# Patient Record
Sex: FEMALE | Race: WHITE | NOT HISPANIC OR LATINO | Employment: PART TIME | URBAN - METROPOLITAN AREA
[De-identification: names, ages, dates, MRNs, and addresses within clinical notes are randomized per-mention and may not be internally consistent; named-entity substitution may affect disease eponyms.]

---

## 2017-02-02 ENCOUNTER — OFFICE VISIT (OUTPATIENT)
Dept: MEDICAL GROUP | Facility: MEDICAL CENTER | Age: 42
End: 2017-02-02
Payer: OTHER GOVERNMENT

## 2017-02-02 VITALS
WEIGHT: 184.4 LBS | HEART RATE: 87 BPM | BODY MASS INDEX: 29.63 KG/M2 | HEIGHT: 66 IN | SYSTOLIC BLOOD PRESSURE: 98 MMHG | DIASTOLIC BLOOD PRESSURE: 68 MMHG | TEMPERATURE: 99.1 F | RESPIRATION RATE: 16 BRPM | OXYGEN SATURATION: 98 %

## 2017-02-02 DIAGNOSIS — Z13.6 SCREENING FOR CARDIOVASCULAR CONDITION: ICD-10-CM

## 2017-02-02 DIAGNOSIS — L40.9 PSORIASIS: ICD-10-CM

## 2017-02-02 DIAGNOSIS — Z00.00 WELLNESS EXAMINATION: ICD-10-CM

## 2017-02-02 DIAGNOSIS — R63.5 WEIGHT GAIN: ICD-10-CM

## 2017-02-02 DIAGNOSIS — Z97.5 IUD (INTRAUTERINE DEVICE) IN PLACE: ICD-10-CM

## 2017-02-02 PROCEDURE — 99203 OFFICE O/P NEW LOW 30 MIN: CPT | Performed by: PHYSICIAN ASSISTANT

## 2017-02-02 ASSESSMENT — PATIENT HEALTH QUESTIONNAIRE - PHQ9: CLINICAL INTERPRETATION OF PHQ2 SCORE: 0

## 2017-02-02 NOTE — Clinical Note
[facility]  Tete Moncada PA-C  [address]  [place] [address]  Fax: [phone] Authorization for Release/Disclosure of Protected Health Information   Name: SUZETTE DUGGAN : 1975 SSN: [id number]   Address: [facility] [place] [address] Phone:    [phone] (home)    I authorize the entity listed below to release/disclose the PHI below to [facility]/Tete Moncada PA-C   Provider or Entity Name:       Address   City, State, Zip   Phone:      Fax:     Reason for request: continuity of care   Information to be released:    [  ] LAST COLONOSCOPY, including any PATH REPORT [  ] LAST DEXA  [  ] LAST MAMMOGRAM  [  ] LAST PAP [  ] RETINA EXAM REPORT  [  ] IMMUNIZATION RECORDS  [  ] Release all info      [  ] Check here and initial the line next to each item to release ALL health information INCLUDING  _____ Care and treatment for drug and / or alcohol abuse  _____ HIV testing, infection status, or AIDS  _____ Genetic Testing    DATES OF SERVICE OR TIME PERIOD TO BE DISCLOSED: _____________  I understand and acknowledge that:  * This Authorization may be revoked at any time by you in writing, except if your health information has already been used or disclosed.  * Your health information that will be used or disclosed as a result of you signing this authorization could be re-disclosed by the recipient. If this occurs, your re-disclosed health information may no longer be protected by State or Federal laws.  * You may refuse to sign this Authorization. Your refusal will not affect your ability to obtain treatment.  * This Authorization becomes effective upon signing and will  on (date) __________. If no date is indicated, this Authorization will  one (1) year from the signature date.    Name: Suzette Duggan    Signature:     Date: 2017

## 2017-02-02 NOTE — MR AVS SNAPSHOT
"        Suzette Rosa   2017 3:00 PM   Office Visit   MRN: [id number]    Department:  [facility]   Dept Phone:  [phone]    Description:  Female : 1975   Provider:  Tete Moncada PA-C           Reason for Visit     Establish Care Thyroid check (not able to lose weight)      Allergies as of 2017     No Known Allergies      You were diagnosed with     Weight gain   [932180]       IUD (intrauterine device) in place   [952784]       Psoriasis   [761500]       Screening for cardiovascular condition   [016405]       Wellness examination   [2096260]         Vital Signs     Blood Pressure Pulse Temperature Respirations Height Weight    98/68 mmHg 87 37.3 °C (99.1 °F) 16 1.676 m (5' 6\") 83.643 kg (184 lb 6.4 oz)    Body Mass Index Oxygen Saturation Breastfeeding? Smoking Status          29.78 kg/m2 98% No Never Smoker         Basic Information     Date Of Birth Sex Race Ethnicity Preferred Language    1975 Female White Non- English      Health Maintenance        Date Due Completion Dates    IMM DTaP/Tdap/Td Vaccine (1 - Tdap) 1996 ---    PAP SMEAR 1998 ---    IMM INFLUENZA (1) 2016 ---            Current Immunizations     No immunizations on file.      Below and/or attached are the medications your provider expects you to take. Review all of your home medications and newly ordered medications with your provider and/or pharmacist. Follow medication instructions as directed by your provider and/or pharmacist. Please keep your medication list with you and share with your provider. Update the information when medications are discontinued, doses are changed, or new medications (including over-the-counter products) are added; and carry medication information at all times in the event of emergency situations     Allergies:  No Known Allergies          Medications  Valid as of: 2017 -  3:31 PM    Generic Name Brand Name Tablet Size Instructions for use    .          "          Medicines prescribed today were sent to:     [facility] - [place], [place] - [address]    [address] [place] [address]    Phone: [phone] Fax: [phone]    Open 24 Hours?: No      Medication refill instructions:       If your prescription bottle indicates you have medication refills left, it is not necessary to call your provider’s office. Please contact your pharmacy and they will refill your medication.    If your prescription bottle indicates you do not have any refills left, you may request refills at any time through one of the following ways: The online [employer] system (except Urgent Care), by calling your provider’s office, or by asking your pharmacy to contact your provider’s office with a refill request. Medication refills are processed only during regular business hours and may not be available until the next business day. Your provider may request additional information or to have a follow-up visit with you prior to refilling your medication.   *Please Note: Medication refills are assigned a new Rx number when refilled electronically. Your pharmacy may indicate that no refills were authorized even though a new prescription for the same medication is available at the pharmacy. Please request the medicine by name with the pharmacy before contacting your provider for a refill.        Your To Do List     Future Labs/Procedures Complete By Expires    CBC WITH DIFFERENTIAL  As directed 2/2/2018    COMP METABOLIC PANEL  As directed 2/2/2018    FREE THYROXINE  As directed 2/2/2018    LIPID PROFILE  As directed 2/2/2018    THYROID PEROXIDASE  (TPO) AB  As directed 2/2/2018    TRIIDOTHYRONINE  As directed 2/2/2018    TSH  As directed 2/2/2018      Referral     A referral request has been sent to our patient care coordination department. Please allow 3-5 business days for us to process this request and contact you either by phone or mail. If you do not hear from us by the 5th business day,  please call us at [phone].           [employer] Access Code: [email or web address]  Expires: 3/2/2017  4:32 PM    [employer]  A secure, online tool to manage your health information     [employer]® is a secure, online tool that connects you to your personalized health information from the privacy of your home -- day or night - making it very easy for you to manage your healthcare. Once the activation process is completed, you can even access your medical information using the [employer] lio, which is available for free in the Apple Lio store or Google Play store.     [employer] provides the following levels of access (as shown below):   My Chart Features   [facility] Primary Care Doctor [facility]  Specialists [facility]  Urgent  Care [facility]  Primary Care  Doctor   Email your healthcare team securely and privately 24/7 X X X    Manage appointments: schedule your next appointment; view details of past/upcoming appointments X      Request prescription refills. X      View recent personal medical records, including lab and immunizations X X X X   View health record, including health history, allergies, medications X X X X   Read reports about your outpatient visits, procedures, consult and ER notes X X X X   See your discharge summary, which is a recap of your hospital and/or ER visit that includes your diagnosis, lab results, and care plan. X X       How to register for [employer]:  1. Go to  [email or web address].  2. Click on the Sign Up Now box, which takes you to the New Member Sign Up page. You will need to provide the following information:  a. Enter your [employer] Access Code exactly as it appears at the top of this page. (You will not need to use this code after you’ve completed the sign-up process. If you do not sign up before the expiration date, you must request a new code.)   b. Enter your date of birth.   c. Enter your home email address.   d. Click Submit, and follow the next screen’s  instructions.  3. Create a I-lightingt ID. This will be your I-lightingt login ID and cannot be changed, so think of one that is secure and easy to remember.  4. Create a I-lightingt password. You can change your password at any time.  5. Enter your Password Reset Question and Answer. This can be used at a later time if you forget your password.   6. Enter your e-mail address. This allows you to receive e-mail notifications when new information is available in tarpipe.  7. Click Sign Up. You can now view your health information.    For assistance activating your [employer]pipe account, call [phone]

## 2017-02-03 PROBLEM — L40.9 PSORIASIS: Status: ACTIVE | Noted: 2017-02-03

## 2017-02-03 PROBLEM — Z97.5 IUD (INTRAUTERINE DEVICE) IN PLACE: Status: ACTIVE | Noted: 2017-02-03

## 2017-02-03 PROBLEM — R63.5 WEIGHT GAIN: Status: ACTIVE | Noted: 2017-02-03

## 2017-02-03 NOTE — ASSESSMENT & PLAN NOTE
Patient notes weight gain over the past 3-5 years. She has noticed after having 2 children ages 5 and 3 that she is unable to lose weight like she would have been able to the past. She feels she's had a plateau. She is working out and eating well and not able to lose weight. She also has problems with low libido, mild depression, dry skin and psoriasis. She wonders if this was related to problems with her thyroid. She would like to have thyroid levels checked. No hair or nail changes.

## 2017-02-03 NOTE — PROGRESS NOTES
Chief Complaint   Patient presents with   • Establish Care     Thyroid check (not able to lose weight)       HISTORY OF THE PRESENT ILLNESS: This is a 41 y.o. female new patient to our practice. This pleasant patient is here today to establish care and discuss concerns of her weight gain. 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 She is a . She did have a physical with the [employer] about a month ago. They did not do lab work that time. She states she is up-to-date on vaccines.    Weight gain  Patient notes weight gain over the past 3-5 years. She has noticed after having 2 children ages 5 and 3 that she is unable to lose weight like she would have been able to the past. She feels she's had a plateau. She is working out and eating well and not able to lose weight. She also has problems with low libido, mild depression, dry skin and psoriasis. She wonders if this was related to problems with her thyroid. She would like to have thyroid levels checked. No hair or nail changes.    IUD (intrauterine device) in place  Patient had a Pap and pelvic approximately 2 years ago and had a Mirena placed. No problems. Will need to establish with gynecologist to follow up with Mirena.    Psoriasis  Especially on right elbow. Not using treatment.      Past Medical History   Diagnosis Date   • Allergy      Seasonal       History reviewed. No pertinent past surgical history.    Family Status   Relation Status Death Age   • Mother Alive    • Father     • Brother Alive      Family History   Problem Relation Age of Onset   • Cancer Mother    • Heart Disease Mother    • Stroke Mother    • Cancer Father    • Lung Disease Father    • Hypertension Brother        Social History   Substance Use Topics   • Smoking status: Never Smoker    • Smokeless tobacco: Never Used   • Alcohol Use: 1.2 oz/week     2 Glasses of wine per week       Allergies: Review of  "patient's allergies indicates no known allergies.    No current Psychiatric-ordered outpatient prescriptions on file.     No current Psychiatric-ordered facility-administered medications on file.       Review of Systems   Constitutional: Negative for fever, chills,and malaise/fatigue.   HENT: Negative for ear pain, nosebleeds, congestion, sore throat and neck pain.    Eyes: Negative for blurred vision.   Respiratory: Negative for cough, sputum production, shortness of breath and wheezing.    Cardiovascular: Negative for chest pain, palpitations, orthopnea and leg swelling.   Gastrointestinal: Negative for heartburn, nausea, vomiting and abdominal pain.   Genitourinary: Negative for dysuria, urgency and frequency.   Musculoskeletal: Patient states she does have a lot of problems with back pain. She states this is related to being a . She does strengthening exercises with her car and back muscles. She does have history of abnormal MRI. No history of spine surgeries.   Neurological: Negative for dizziness, tingling, tremors, sensory change, focal weakness and headaches.   Endo/Heme/Allergies: Does not bruise/bleed easily.   Psychiatric/Behavioral: Negative for anxiety, or memory loss.     All other systems reviewed and are negative except as in HPI.    Exam: Blood pressure 98/68, pulse 87, temperature 37.3 °C (99.1 °F), resp. rate 16, height 1.676 m (5' 6\"), weight 83.643 kg (184 lb 6.4 oz), SpO2 98 %, not currently breastfeeding.  General: Normal appearing. No distress.  Neck: Supple without JVD or bruit. Thyroid is not enlarged.  Pulmonary: Clear to ausculation.  Normal effort. No rales, ronchi, or wheezing.  Cardiovascular: Regular rate and rhythm without murmur. Carotid and radial pulses are intact and equal bilaterally.  Neurologic: Grossly nonfocal  Lymph: No cervical, supraclavicular or axillary lymph nodes are palpable  Skin: Warm and dry.  No obvious lesions.  Musculoskeletal: Normal gait. No extremity cyanosis, " clubbing, or edema.  Psych: Normal mood and affect. Alert and oriented x3. Judgment and insight is normal.        Assessment/Plan  1. Weight gain  TSH    THYROID PEROXIDASE  (TPO) AB    TRIIDOTHYRONINE    FREE THYROXINE   2. IUD (intrauterine device) in place  REFERRAL TO GYNECOLOGY   3. Psoriasis  REFERRAL TO DERMATOLOGY   4. Screening for cardiovascular condition  LIPID PROFILE   5. Wellness examination  CBC WITH DIFFERENTIAL    COMP METABOLIC PANEL     Will f/u after labs.

## 2017-02-03 NOTE — ASSESSMENT & PLAN NOTE
Patient had a Pap and pelvic approximately 2 years ago and had a Mirena placed. No problems. Will need to establish with gynecologist to follow up with Mirena.

## 2017-02-07 ENCOUNTER — HOSPITAL ENCOUNTER (OUTPATIENT)
Dept: LAB | Facility: MEDICAL CENTER | Age: 42
End: 2017-02-07
Attending: PHYSICIAN ASSISTANT
Payer: OTHER GOVERNMENT

## 2017-02-07 DIAGNOSIS — Z13.6 SCREENING FOR CARDIOVASCULAR CONDITION: ICD-10-CM

## 2017-02-07 DIAGNOSIS — Z00.00 WELLNESS EXAMINATION: ICD-10-CM

## 2017-02-07 DIAGNOSIS — R63.5 WEIGHT GAIN: ICD-10-CM

## 2017-02-07 LAB
ALBUMIN SERPL BCP-MCNC: 4.4 G/DL (ref 3.2–4.9)
ALBUMIN/GLOB SERPL: 1.5 G/DL
ALP SERPL-CCNC: 76 U/L (ref 30–99)
ALT SERPL-CCNC: 13 U/L (ref 2–50)
ANION GAP SERPL CALC-SCNC: 8 MMOL/L (ref 0–11.9)
AST SERPL-CCNC: 19 U/L (ref 12–45)
BASOPHILS # BLD AUTO: 0.04 K/UL (ref 0–0.12)
BASOPHILS NFR BLD AUTO: 0.3 % (ref 0–1.8)
BILIRUB SERPL-MCNC: 0.8 MG/DL (ref 0.1–1.5)
BUN SERPL-MCNC: 15 MG/DL (ref 8–22)
CALCIUM SERPL-MCNC: 9.4 MG/DL (ref 8.5–10.5)
CHLORIDE SERPL-SCNC: 105 MMOL/L (ref 96–112)
CHOLEST SERPL-MCNC: 167 MG/DL (ref 100–199)
CO2 SERPL-SCNC: 25 MMOL/L (ref 20–33)
CREAT SERPL-MCNC: 0.79 MG/DL (ref 0.5–1.4)
EOSINOPHIL # BLD: 0.36 K/UL (ref 0–0.51)
EOSINOPHIL NFR BLD AUTO: 2.8 % (ref 0–6.9)
ERYTHROCYTE [DISTWIDTH] IN BLOOD BY AUTOMATED COUNT: 40.4 FL (ref 35.9–50)
GLOBULIN SER CALC-MCNC: 3 G/DL (ref 1.9–3.5)
GLUCOSE SERPL-MCNC: 83 MG/DL (ref 65–99)
HCT VFR BLD AUTO: 45.2 % (ref 37–47)
HDLC SERPL-MCNC: 57 MG/DL
HGB BLD-MCNC: 14.9 G/DL (ref 12–16)
IMM GRANULOCYTES # BLD AUTO: 0.06 K/UL (ref 0–0.11)
IMM GRANULOCYTES NFR BLD AUTO: 0.5 % (ref 0–0.9)
LDLC SERPL CALC-MCNC: 92 MG/DL
LYMPHOCYTES # BLD: 3.28 K/UL (ref 1–4.8)
LYMPHOCYTES NFR BLD AUTO: 25.9 % (ref 22–41)
MCH RBC QN AUTO: 29.6 PG (ref 27–33)
MCHC RBC AUTO-ENTMCNC: 33 G/DL (ref 33.6–35)
MCV RBC AUTO: 89.7 FL (ref 81.4–97.8)
MONOCYTES # BLD: 1.01 K/UL (ref 0–0.85)
MONOCYTES NFR BLD AUTO: 8 % (ref 0–13.4)
NEUTROPHILS # BLD: 7.93 K/UL (ref 2–7.15)
NEUTROPHILS NFR BLD AUTO: 62.5 % (ref 44–72)
NRBC # BLD AUTO: 0 K/UL
NRBC BLD-RTO: 0 /100 WBC
PLATELET # BLD AUTO: 322 K/UL (ref 164–446)
PMV BLD AUTO: 9.8 FL (ref 9–12.9)
POTASSIUM SERPL-SCNC: 4 MMOL/L (ref 3.6–5.5)
PROT SERPL-MCNC: 7.4 G/DL (ref 6–8.2)
RBC # BLD AUTO: 5.04 M/UL (ref 4.2–5.4)
SODIUM SERPL-SCNC: 138 MMOL/L (ref 135–145)
T3 SERPL-MCNC: 110 NG/DL (ref 60–181)
T4 FREE SERPL-MCNC: 0.75 NG/DL (ref 0.53–1.43)
THYROPEROXIDASE AB SERPL-ACNC: 796.8 IU/ML (ref 0–9)
TRIGL SERPL-MCNC: 88 MG/DL (ref 0–149)
TSH SERPL DL<=0.005 MIU/L-ACNC: 5.79 UIU/ML (ref 0.3–3.7)
WBC # BLD AUTO: 12.7 K/UL (ref 4.8–10.8)

## 2017-02-07 PROCEDURE — 86376 MICROSOMAL ANTIBODY EACH: CPT

## 2017-02-07 PROCEDURE — 80061 LIPID PANEL: CPT

## 2017-02-07 PROCEDURE — 80053 COMPREHEN METABOLIC PANEL: CPT

## 2017-02-07 PROCEDURE — 36415 COLL VENOUS BLD VENIPUNCTURE: CPT

## 2017-02-07 PROCEDURE — 84443 ASSAY THYROID STIM HORMONE: CPT

## 2017-02-07 PROCEDURE — 84439 ASSAY OF FREE THYROXINE: CPT

## 2017-02-07 PROCEDURE — 85025 COMPLETE CBC W/AUTO DIFF WBC: CPT

## 2017-02-07 PROCEDURE — 84480 ASSAY TRIIODOTHYRONINE (T3): CPT

## 2017-02-09 ENCOUNTER — TELEPHONE (OUTPATIENT)
Dept: MEDICAL GROUP | Facility: MEDICAL CENTER | Age: 42
End: 2017-02-09

## 2017-02-09 DIAGNOSIS — E06.3 HYPOTHYROIDISM DUE TO HASHIMOTO'S THYROIDITIS: ICD-10-CM

## 2017-02-09 DIAGNOSIS — E03.8 HYPOTHYROIDISM DUE TO HASHIMOTO'S THYROIDITIS: ICD-10-CM

## 2017-02-09 RX ORDER — LEVOTHYROXINE SODIUM 0.03 MG/1
25 TABLET ORAL
Qty: 30 TAB | Refills: 3 | Status: SHIPPED | OUTPATIENT
Start: 2017-02-09 | End: 2017-05-08 | Stop reason: SDUPTHER

## 2017-02-09 NOTE — TELEPHONE ENCOUNTER
Spoke with patient regarding lab results. Will start synthroid 25mcg daily and retest in 6-8 weeks. Tete Moncada PA-C

## 2017-04-08 ENCOUNTER — PATIENT MESSAGE (OUTPATIENT)
Dept: MEDICAL GROUP | Facility: MEDICAL CENTER | Age: 42
End: 2017-04-08

## 2017-04-08 DIAGNOSIS — L65.9 HAIR LOSS: ICD-10-CM

## 2017-04-08 DIAGNOSIS — R63.5 WEIGHT GAIN: ICD-10-CM

## 2017-04-19 ENCOUNTER — HOSPITAL ENCOUNTER (OUTPATIENT)
Dept: LAB | Facility: MEDICAL CENTER | Age: 42
End: 2017-04-19
Attending: PHYSICIAN ASSISTANT
Payer: OTHER GOVERNMENT

## 2017-04-19 DIAGNOSIS — R63.5 WEIGHT GAIN: ICD-10-CM

## 2017-04-19 DIAGNOSIS — L65.9 HAIR LOSS: ICD-10-CM

## 2017-04-19 LAB
T3FREE SERPL-MCNC: 3.18 PG/ML (ref 2.4–4.2)
TSH SERPL DL<=0.005 MIU/L-ACNC: 3.85 UIU/ML (ref 0.3–3.7)

## 2017-04-19 PROCEDURE — 84481 FREE ASSAY (FT-3): CPT

## 2017-04-19 PROCEDURE — 84443 ASSAY THYROID STIM HORMONE: CPT

## 2017-04-19 PROCEDURE — 36415 COLL VENOUS BLD VENIPUNCTURE: CPT

## 2017-04-19 PROCEDURE — 86800 THYROGLOBULIN ANTIBODY: CPT

## 2017-04-19 PROCEDURE — 84482 T3 REVERSE: CPT

## 2017-04-21 LAB — THYROGLOB AB SERPL-ACNC: 1.2 IU/ML (ref 0–4)

## 2017-04-22 LAB — T3REVERSE SERPL-MCNC: 9.3 NG/DL (ref 9–27)

## 2017-04-25 ENCOUNTER — TELEPHONE (OUTPATIENT)
Dept: MEDICAL GROUP | Facility: MEDICAL CENTER | Age: 42
End: 2017-04-25

## 2017-04-25 DIAGNOSIS — R79.89 ABNORMAL TSH: ICD-10-CM

## 2017-05-01 ENCOUNTER — PATIENT MESSAGE (OUTPATIENT)
Dept: MEDICAL GROUP | Facility: MEDICAL CENTER | Age: 42
End: 2017-05-01

## 2017-05-01 DIAGNOSIS — E03.9 HYPOTHYROIDISM, UNSPECIFIED TYPE: ICD-10-CM

## 2017-05-08 DIAGNOSIS — E03.8 HYPOTHYROIDISM DUE TO HASHIMOTO'S THYROIDITIS: ICD-10-CM

## 2017-05-08 DIAGNOSIS — E06.3 HYPOTHYROIDISM DUE TO HASHIMOTO'S THYROIDITIS: ICD-10-CM

## 2017-05-09 RX ORDER — LEVOTHYROXINE SODIUM 0.03 MG/1
25 TABLET ORAL
Qty: 30 TAB | Refills: 0 | Status: SHIPPED | OUTPATIENT
Start: 2017-05-09 | End: 2017-06-06

## 2017-05-17 ENCOUNTER — TELEPHONE (OUTPATIENT)
Dept: MEDICAL GROUP | Facility: MEDICAL CENTER | Age: 42
End: 2017-05-17

## 2017-05-17 NOTE — TELEPHONE ENCOUNTER
Request was called in by Zulay Brown for the office notes on 2/2/17 as well as most recent labs 2/7/17. Office note and labs were printed at request and faxed to [phone] to the [facility] at the [facility].

## 2017-06-06 ENCOUNTER — OFFICE VISIT (OUTPATIENT)
Dept: ENDOCRINOLOGY | Facility: MEDICAL CENTER | Age: 42
End: 2017-06-06
Payer: OTHER GOVERNMENT

## 2017-06-06 VITALS
HEIGHT: 66 IN | BODY MASS INDEX: 28.77 KG/M2 | OXYGEN SATURATION: 95 % | HEART RATE: 95 BPM | DIASTOLIC BLOOD PRESSURE: 72 MMHG | SYSTOLIC BLOOD PRESSURE: 98 MMHG | WEIGHT: 179 LBS

## 2017-06-06 DIAGNOSIS — E55.9 VITAMIN D DEFICIENCY: ICD-10-CM

## 2017-06-06 DIAGNOSIS — E06.3 HYPOTHYROIDISM DUE TO HASHIMOTO'S THYROIDITIS: ICD-10-CM

## 2017-06-06 DIAGNOSIS — E53.8 VITAMIN B12 DEFICIENCY: ICD-10-CM

## 2017-06-06 DIAGNOSIS — E03.8 HYPOTHYROIDISM DUE TO HASHIMOTO'S THYROIDITIS: ICD-10-CM

## 2017-06-06 PROCEDURE — 99204 OFFICE O/P NEW MOD 45 MIN: CPT | Performed by: INTERNAL MEDICINE

## 2017-06-06 RX ORDER — LEVOTHYROXINE SODIUM 0.12 MG/1
125 TABLET ORAL
Qty: 30 TAB | Refills: 3 | Status: SHIPPED | OUTPATIENT
Start: 2017-06-06 | End: 2017-10-16 | Stop reason: SDUPTHER

## 2017-06-06 RX ORDER — LIOTHYRONINE SODIUM 25 UG/1
25 TABLET ORAL DAILY
Qty: 30 TAB | Refills: 1 | Status: SHIPPED | OUTPATIENT
Start: 2017-06-06 | End: 2017-08-02 | Stop reason: SDUPTHER

## 2017-06-07 NOTE — PROGRESS NOTES
New Patient Consult Note  Primary care physician: Tete Moncada PA-C    Reason for consult: Hypothyroidism    HPI:  Jovita Rosa is a 42 y.o. old patient who comes in today for evaluation of hypothyroidism on 25 µg of levothyroxine daily. She continues to feel extremely tired and fatigued throughout the day. She has called noticed quite a bit of hair loss. She works for [employer] and is currently grounded because of the problems that she is having and would like to return back to work as soon as possible.    Also complains of difficulty losing weight despite 6 weeks of intense exercise schedule and following the dietary restrictions. He did not lose even a single pound during this timeframe. This prompted her to look into the thyroid issue and was found  to have hypothyroidism.    ROS:  Constitutional: Fatigue, difficulty losing weight No weight loss  Cardiac: No palpitations or racing heart  Resp: No shortness of breath  Neuro: No numbness or tinging in feet  Endo: No heat or cold intolerance, no polyuria or polydipsia  Integumentary: Hair loss present, thinning of hair  All other systems were reviewed and were negative.    Past Medical History:  Patient Active Problem List    Diagnosis Date Noted   • Weight gain 02/03/2017   • IUD (intrauterine device) in place 02/03/2017   • Psoriasis 02/03/2017       Past Surgical History:  History reviewed. No pertinent past surgical history.    Allergies:  Review of patient's allergies indicates no known allergies.    Social History:  Social History     Social History   • Marital Status:      Spouse Name: N/A   • Number of Children: N/A   • Years of Education: N/A     Occupational History   • Not on file.     Social History Main Topics   • Smoking status: Never Smoker    • Smokeless tobacco: Never Used   • Alcohol Use: 1.2 oz/week     2 Glasses of wine per week   • Drug Use: No   • Sexual Activity:     Partners: Male     Birth Control/ Protection: IUD  "    Other Topics Concern   • Not on file     Social History Narrative       Family History:  Family History   Problem Relation Age of Onset   • Cancer Mother    • Heart Disease Mother    • Stroke Mother    • Cancer Father    • Lung Disease Father    • Hypertension Brother        Medications:    Current outpatient prescriptions:   •  levothyroxine (SYNTHROID) 125 MCG Tab, Take 1 Tab by mouth Every morning on an empty stomach for 30 days., Disp: 30 Tab, Rfl: 3  •  liothyronine (CYTOMEL) 25 MCG Tab, Take 1 Tab by mouth every day for 30 days., Disp: 30 Tab, Rfl: 1    Labs:    Physical Examination:  Vital signs: BP 98/72 mmHg  Pulse 95  Ht 1.676 m (5' 5.98\")  Wt 81.194 kg (179 lb)  BMI 28.91 kg/m2  SpO2 95%  General: No apparent distress, cooperative  Eyes: No scleral icterus or discharge  ENMT: Normal on external inspection of nose, lips, normal thyroid exam  Neck: No abnormal masses on inspection  Resp: Normal effort, clear to auscultation bilaterally   CVS: Regular rate and rhythm, S1 S2 normal, no murmur   Extremities: No edema  Neuro: Alert and oriented, sluggish deep tendon reflexes  Skin: No rash, slightly dry  Psych: Normal mood and affect    Assessment and Plan:    1. Hypothyroidism due to Hashimoto's thyroiditis  Biochemically  And clinically hypothyroid. Start 125 µg of levothyroxine and 25 µg of Cytomel (T3 hormone) daily. Repeat thyroid function tests in about 4 weeks. Patient educated about the signs and symptoms of hyperthyroidism while on thyroid hormone replacement.     2. Vitamin D deficiency  Low vitamin D levels can contribute to fatigue, hair loss. Imperative to rule it out.     3. Vitamin B12 deficiency  Low vitamin B12 can also contribute to fatigue and hair loss. Imperative to rule it out.      Return in about 6 weeks (around 7/18/2017).    Thank you for allowing me to participate in the care of this patient.    Matt Zuniga M.D.  06/06/2017    CC:   Tete Moncada PA-C    This note " was created using voice recognition software (Dragon). The accuracy of the dictation is limited by the abilities of the software. I have reviewed the note prior to signing, however some errors in grammar and context are still possible. If you have any questions related to this note please do not hesitate to contact our office.

## 2017-06-08 ENCOUNTER — RX ONLY (OUTPATIENT)
Age: 42
Setting detail: RX ONLY
End: 2017-06-08

## 2017-07-17 ENCOUNTER — HOSPITAL ENCOUNTER (OUTPATIENT)
Dept: LAB | Facility: MEDICAL CENTER | Age: 42
End: 2017-07-17
Attending: INTERNAL MEDICINE
Payer: OTHER GOVERNMENT

## 2017-07-17 DIAGNOSIS — E06.3 HYPOTHYROIDISM DUE TO HASHIMOTO'S THYROIDITIS: ICD-10-CM

## 2017-07-17 DIAGNOSIS — E55.9 VITAMIN D DEFICIENCY: ICD-10-CM

## 2017-07-17 DIAGNOSIS — E53.8 VITAMIN B12 DEFICIENCY: ICD-10-CM

## 2017-07-17 DIAGNOSIS — E03.8 HYPOTHYROIDISM DUE TO HASHIMOTO'S THYROIDITIS: ICD-10-CM

## 2017-07-17 LAB
25(OH)D3 SERPL-MCNC: 15 NG/ML (ref 30–100)
T3 SERPL-MCNC: 118.6 NG/DL (ref 60–181)
T4 FREE SERPL-MCNC: 1.05 NG/DL (ref 0.53–1.43)
THYROPEROXIDASE AB SERPL-ACNC: 192.5 IU/ML (ref 0–9)
TSH SERPL DL<=0.005 MIU/L-ACNC: 0.03 UIU/ML (ref 0.3–3.7)
VIT B12 SERPL-MCNC: 357 PG/ML (ref 211–911)

## 2017-07-17 PROCEDURE — 82607 VITAMIN B-12: CPT

## 2017-07-17 PROCEDURE — 84480 ASSAY TRIIODOTHYRONINE (T3): CPT

## 2017-07-17 PROCEDURE — 86376 MICROSOMAL ANTIBODY EACH: CPT

## 2017-07-17 PROCEDURE — 84439 ASSAY OF FREE THYROXINE: CPT

## 2017-07-17 PROCEDURE — 84443 ASSAY THYROID STIM HORMONE: CPT

## 2017-07-17 PROCEDURE — 36415 COLL VENOUS BLD VENIPUNCTURE: CPT

## 2017-07-17 PROCEDURE — 82306 VITAMIN D 25 HYDROXY: CPT

## 2017-07-25 ENCOUNTER — OFFICE VISIT (OUTPATIENT)
Dept: ENDOCRINOLOGY | Facility: MEDICAL CENTER | Age: 42
End: 2017-07-25
Payer: OTHER GOVERNMENT

## 2017-07-25 VITALS
BODY MASS INDEX: 27.97 KG/M2 | HEART RATE: 107 BPM | DIASTOLIC BLOOD PRESSURE: 62 MMHG | WEIGHT: 174 LBS | SYSTOLIC BLOOD PRESSURE: 116 MMHG | HEIGHT: 66 IN | OXYGEN SATURATION: 98 %

## 2017-07-25 DIAGNOSIS — E55.9 VITAMIN D DEFICIENCY: ICD-10-CM

## 2017-07-25 DIAGNOSIS — E03.9 HYPOTHYROIDISM, UNSPECIFIED TYPE: ICD-10-CM

## 2017-07-25 PROCEDURE — 99213 OFFICE O/P EST LOW 20 MIN: CPT | Performed by: INTERNAL MEDICINE

## 2017-07-25 RX ORDER — LIOTHYRONINE SODIUM 25 UG/1
25 TABLET ORAL DAILY
COMMUNITY
End: 2017-11-27 | Stop reason: SDUPTHER

## 2017-07-25 RX ORDER — LEVOTHYROXINE SODIUM 0.12 MG/1
125 TABLET ORAL
COMMUNITY
End: 2017-11-27 | Stop reason: SDUPTHER

## 2017-07-25 NOTE — Clinical Note
July 25, 2017         Patient: Jovita Rosa   YOB: 1975   Date of Visit: 7/25/2017           To Whom it May Concern:    Jovita Rosa was seen in my clinic on 7/25/2017. She is okay to come back to work.    If you have any questions or concerns, please don't hesitate to call.        Sincerely,           Matt Zuniga M.D.  Electronically Signed

## 2017-07-25 NOTE — MR AVS SNAPSHOT
"        Jovita Moe   2017 4:50 PM   Office Visit   MRN: [id number]    Department:  [facility]in[facility]   Dept Phone:  [phone]    Description:  Female : 1975   Provider:  Matt Zuniga M.D.           Allergies as of 2017     No Known Allergies      You were diagnosed with     Vitamin D deficiency   [9515408]       Hypothyroidism, unspecified type   [5627627]         Vital Signs     Blood Pressure Pulse Height Weight Body Mass Index Oxygen Saturation    116/62 mmHg 107 1.676 m (5' 5.98\") 78.926 kg (174 lb) 28.10 kg/m2 98%    Smoking Status                   Never Smoker            Basic Information     Date Of Birth Sex Race Ethnicity Preferred Language    1975 Female White Non- English      Problem List              ICD-10-CM Priority Class Noted - Resolved    Weight gain R63.5   2/3/2017 - Present    IUD (intrauterine device) in place Z97.5   2/3/2017 - Present    Psoriasis L40.9   2/3/2017 - Present      Health Maintenance        Date Due Completion Dates    IMM DTaP/Tdap/Td Vaccine (1 - Tdap) 1996 ---    PAP SMEAR 1998 ---    MAMMOGRAM 2017 ---    IMM INFLUENZA (1) 2017 ---            Current Immunizations     No immunizations on file.      Below and/or attached are the medications your provider expects you to take. Review all of your home medications and newly ordered medications with your provider and/or pharmacist. Follow medication instructions as directed by your provider and/or pharmacist. Please keep your medication list with you and share with your provider. Update the information when medications are discontinued, doses are changed, or new medications (including over-the-counter products) are added; and carry medication information at all times in the event of emergency situations     Allergies:  No Known Allergies          Medications  Valid as of: 2017 -  4:58 PM    Generic Name Brand Name Tablet Size Instructions for use   " Cholecalciferol (Tab) Vitamin D3 07797 UNITS Take 1 Capsule by mouth every Monday and Thursday.        Levothyroxine Sodium (Tab) SYNTHROID 125 MCG Take 125 mcg by mouth Every morning on an empty stomach.        Liothyronine Sodium (Tab) CYTOMEL 25 MCG Take 25 mcg by mouth every day.        .                 Medicines prescribed today were sent to:     [facility] - [place], [place] - [address]    [address] [place] [address]    Phone: [phone] Fax: [phone]    Open 24 Hours?: No      Medication refill instructions:       If your prescription bottle indicates you have medication refills left, it is not necessary to call your provider’s office. Please contact your pharmacy and they will refill your medication.    If your prescription bottle indicates you do not have any refills left, you may request refills at any time through one of the following ways: The online [employer] system (except Urgent Care), by calling your provider’s office, or by asking your pharmacy to contact your provider’s office with a refill request. Medication refills are processed only during regular business hours and may not be available until the next business day. Your provider may request additional information or to have a follow-up visit with you prior to refilling your medication.   *Please Note: Medication refills are assigned a new Rx number when refilled electronically. Your pharmacy may indicate that no refills were authorized even though a new prescription for the same medication is available at the pharmacy. Please request the medicine by name with the pharmacy before contacting your provider for a refill.        Your To Do List     Future Labs/Procedures Complete By Expires    FREE THYROXINE  9/5/2017 7/25/2018    Scheduling Instructions:    To be done after 6 weeks.    TRIIDOTHYRONINE  9/5/2017 7/25/2018    Scheduling Instructions:    To be done after 6 weeks.    Comments:    Total T3    TSH  9/5/2017 7/25/2018     Scheduling Instructions:    To be done after 6 weeks.         [employer] Access Code: Activation code not generated  Current [employer] Packet Status: Active

## 2017-07-26 NOTE — PROGRESS NOTES
"Endocrinology Clinic Progress Note  PCP: Tete Moncada PA-C    HPI:  Jovita Rosa is a 42 y.o. old patient who comes in today for routine follow up. She is currently on levothyroxine 125 µg daily along with Cytomel 25 µg daily. Her energy levels are much improved and she  Has also lost 5 pounds since last visit.  Hair loss has also stopped considerably. She feels as if there is still a slight brain fog.  Overall she feels just much better.    ROS:  Constitutional: No unintentional weight loss  Endo: Denies excessive thirst or frequent urination    Past Medical History:  Patient Active Problem List    Diagnosis Date Noted   • Weight gain 02/03/2017   • IUD (intrauterine device) in place 02/03/2017   • Psoriasis 02/03/2017       Medications:    Current outpatient prescriptions:   •  levothyroxine (SYNTHROID) 125 MCG Tab, Take 125 mcg by mouth Every morning on an empty stomach., Disp: , Rfl:   •  liothyronine (CYTOMEL) 25 MCG Tab, Take 25 mcg by mouth every day., Disp: , Rfl:   •  [START ON 7/27/2017] Cholecalciferol (VITAMIN D3) 36451 UNITS Tab, Take 1 Capsule by mouth every Monday and Thursday., Disp: 16 Tab, Rfl: 0    Labs: Reviewed    Physical Examination:  Vital signs: /62 mmHg  Pulse 107  Ht 1.676 m (5' 5.98\")  Wt 78.926 kg (174 lb)  BMI 28.10 kg/m2  SpO2 98% Body mass index is 28.1 kg/(m^2).  General: No apparent distress, cooperative  Eyes: No scleral icterus, no discharge, normal eyelids  Neck: No abnormal masses on inspection, normal thyroid exam  Resp: Normal effort, clear to auscultation bilaterally  CVS: Regular rate and rhythm, S1 S2 normal, no murmur  Extremities: No lower extremity edema  Abdomen:  Mild abdominal obesity present  Musculoskeletal: Normal digits and nails  Skin: No rash on visible skin  Psych: Alert and oriented, normal mood and affect, intact memory and able to make informed decisions.    Assessment and Plan:    1. Vitamin D deficiency  start  - Cholecalciferol (VITAMIN " D3) 19971 UNITS Tab; Take 1 Capsule by mouth every Monday and Thursday for about 8 week and then 5000-10,000 units daily as maintenance dose.    2. Hypothyroidism, unspecified type  biochemically she is slightly hyperthyroid; however clinically euthyroid; to skip cytomel 25 mcg on one day of the week.     RTC after 2 months.     Thank you for allowing me to participate in the care of this patient.    Matt Zuniga M.D.    CC:   Tete Moncada PA-C    This note was created using voice recognition software (Dragon). The accuracy of the dictation is limited by the abilities of the software. I have reviewed the note prior to signing, however some errors in grammar and context are still possible. If you have any questions related to this note please do not hesitate to contact our office.

## 2017-08-02 RX ORDER — LIOTHYRONINE SODIUM 25 UG/1
TABLET ORAL
Qty: 30 TAB | Refills: 0 | Status: SHIPPED | OUTPATIENT
Start: 2017-08-02 | End: 2017-09-05 | Stop reason: SDUPTHER

## 2017-09-05 RX ORDER — LIOTHYRONINE SODIUM 25 UG/1
TABLET ORAL
Qty: 30 TAB | Refills: 0 | Status: SHIPPED | OUTPATIENT
Start: 2017-09-05 | End: 2017-10-16 | Stop reason: SDUPTHER

## 2017-09-21 DIAGNOSIS — E55.9 VITAMIN D DEFICIENCY: ICD-10-CM

## 2017-09-25 ENCOUNTER — HOSPITAL ENCOUNTER (OUTPATIENT)
Dept: LAB | Facility: MEDICAL CENTER | Age: 42
End: 2017-09-25
Attending: INTERNAL MEDICINE
Payer: OTHER GOVERNMENT

## 2017-09-25 DIAGNOSIS — E03.8 HYPOTHYROIDISM DUE TO HASHIMOTO'S THYROIDITIS: ICD-10-CM

## 2017-09-25 DIAGNOSIS — E55.9 VITAMIN D DEFICIENCY: ICD-10-CM

## 2017-09-25 DIAGNOSIS — E03.9 HYPOTHYROIDISM, UNSPECIFIED TYPE: ICD-10-CM

## 2017-09-25 DIAGNOSIS — E06.3 HYPOTHYROIDISM DUE TO HASHIMOTO'S THYROIDITIS: ICD-10-CM

## 2017-09-25 LAB
25(OH)D3 SERPL-MCNC: 68 NG/ML (ref 30–100)
T3 SERPL-MCNC: 114 NG/DL (ref 60–181)
T4 FREE SERPL-MCNC: 0.95 NG/DL (ref 0.53–1.43)
TSH SERPL DL<=0.005 MIU/L-ACNC: <0.015 UIU/ML (ref 0.3–3.7)

## 2017-09-25 PROCEDURE — 84480 ASSAY TRIIODOTHYRONINE (T3): CPT

## 2017-09-25 PROCEDURE — 84439 ASSAY OF FREE THYROXINE: CPT

## 2017-09-25 PROCEDURE — 82306 VITAMIN D 25 HYDROXY: CPT

## 2017-09-25 PROCEDURE — 36415 COLL VENOUS BLD VENIPUNCTURE: CPT

## 2017-09-25 PROCEDURE — 84443 ASSAY THYROID STIM HORMONE: CPT

## 2017-10-11 DIAGNOSIS — E06.3 HYPOTHYROIDISM DUE TO HASHIMOTO'S THYROIDITIS: ICD-10-CM

## 2017-10-11 DIAGNOSIS — E03.8 HYPOTHYROIDISM DUE TO HASHIMOTO'S THYROIDITIS: ICD-10-CM

## 2017-10-11 NOTE — PROGRESS NOTES
Advised patient to take 125 mcg of levothyroxine and 25 mcg of cytomel daily on 5 days of the week only via my chart communication. REpeat labs after 4-6 weeks.

## 2017-10-16 DIAGNOSIS — E06.3 HYPOTHYROIDISM DUE TO HASHIMOTO'S THYROIDITIS: ICD-10-CM

## 2017-10-16 DIAGNOSIS — E03.8 HYPOTHYROIDISM DUE TO HASHIMOTO'S THYROIDITIS: ICD-10-CM

## 2017-10-17 RX ORDER — LIOTHYRONINE SODIUM 25 UG/1
TABLET ORAL
Qty: 30 TAB | Refills: 0 | Status: SHIPPED | OUTPATIENT
Start: 2017-10-17 | End: 2017-11-28 | Stop reason: SDUPTHER

## 2017-10-17 RX ORDER — LEVOTHYROXINE SODIUM 0.12 MG/1
TABLET ORAL
Qty: 30 TAB | Refills: 0 | Status: SHIPPED | OUTPATIENT
Start: 2017-10-17 | End: 2017-11-27 | Stop reason: SDUPTHER

## 2017-11-25 ENCOUNTER — HOSPITAL ENCOUNTER (OUTPATIENT)
Dept: LAB | Facility: MEDICAL CENTER | Age: 42
End: 2017-11-25
Attending: INTERNAL MEDICINE
Payer: OTHER GOVERNMENT

## 2017-11-25 DIAGNOSIS — E06.3 HYPOTHYROIDISM DUE TO HASHIMOTO'S THYROIDITIS: ICD-10-CM

## 2017-11-25 DIAGNOSIS — E03.8 HYPOTHYROIDISM DUE TO HASHIMOTO'S THYROIDITIS: ICD-10-CM

## 2017-11-25 LAB
T3 SERPL-MCNC: 100.1 NG/DL (ref 60–181)
T4 FREE SERPL-MCNC: 0.63 NG/DL (ref 0.53–1.43)
TSH SERPL DL<=0.005 MIU/L-ACNC: 0.2 UIU/ML (ref 0.3–3.7)

## 2017-11-25 PROCEDURE — 84480 ASSAY TRIIODOTHYRONINE (T3): CPT

## 2017-11-25 PROCEDURE — 36415 COLL VENOUS BLD VENIPUNCTURE: CPT

## 2017-11-25 PROCEDURE — 84439 ASSAY OF FREE THYROXINE: CPT

## 2017-11-25 PROCEDURE — 84443 ASSAY THYROID STIM HORMONE: CPT

## 2017-11-27 DIAGNOSIS — E06.3 HYPOTHYROIDISM DUE TO HASHIMOTO'S THYROIDITIS: ICD-10-CM

## 2017-11-27 DIAGNOSIS — E03.8 HYPOTHYROIDISM DUE TO HASHIMOTO'S THYROIDITIS: ICD-10-CM

## 2017-11-27 RX ORDER — LIOTHYRONINE SODIUM 25 UG/1
25 TABLET ORAL DAILY
Qty: 90 TAB | Refills: 3 | Status: SHIPPED | OUTPATIENT
Start: 2017-11-27 | End: 2017-11-27 | Stop reason: SDUPTHER

## 2017-11-27 RX ORDER — LIOTHYRONINE SODIUM 25 UG/1
25 TABLET ORAL DAILY
Qty: 90 TAB | Refills: 3 | Status: SHIPPED | OUTPATIENT
Start: 2017-11-27 | End: 2018-01-07

## 2017-11-27 RX ORDER — LEVOTHYROXINE SODIUM 0.12 MG/1
125 TABLET ORAL
Qty: 90 TAB | Refills: 3 | Status: SHIPPED | OUTPATIENT
Start: 2017-11-27 | End: 2017-11-28 | Stop reason: SDUPTHER

## 2017-11-27 RX ORDER — LEVOTHYROXINE SODIUM 0.12 MG/1
125 TABLET ORAL
Qty: 90 TAB | Refills: 3 | Status: SHIPPED | OUTPATIENT
Start: 2017-11-27 | End: 2018-01-07

## 2017-11-28 DIAGNOSIS — E06.3 HYPOTHYROIDISM DUE TO HASHIMOTO'S THYROIDITIS: ICD-10-CM

## 2017-11-28 DIAGNOSIS — E03.8 HYPOTHYROIDISM DUE TO HASHIMOTO'S THYROIDITIS: ICD-10-CM

## 2017-11-29 RX ORDER — LEVOTHYROXINE SODIUM 0.12 MG/1
125 TABLET ORAL
Qty: 30 TAB | Refills: 3
Start: 2017-11-29 | End: 2018-01-07

## 2017-11-29 RX ORDER — LIOTHYRONINE SODIUM 25 UG/1
25 TABLET ORAL DAILY
Qty: 90 TAB | Refills: 3 | Status: SHIPPED | OUTPATIENT
Start: 2017-11-29 | End: 2018-01-07

## 2017-12-11 ENCOUNTER — OFFICE VISIT (OUTPATIENT)
Dept: ENDOCRINOLOGY | Facility: MEDICAL CENTER | Age: 42
End: 2017-12-11
Payer: OTHER GOVERNMENT

## 2017-12-11 ENCOUNTER — TELEPHONE (OUTPATIENT)
Dept: MEDICAL GROUP | Facility: MEDICAL CENTER | Age: 42
End: 2017-12-11

## 2017-12-11 VITALS
HEIGHT: 66 IN | SYSTOLIC BLOOD PRESSURE: 116 MMHG | WEIGHT: 176 LBS | DIASTOLIC BLOOD PRESSURE: 68 MMHG | RESPIRATION RATE: 16 BRPM | HEART RATE: 104 BPM | OXYGEN SATURATION: 99 % | BODY MASS INDEX: 28.28 KG/M2

## 2017-12-11 DIAGNOSIS — E03.9 HYPOTHYROIDISM, UNSPECIFIED TYPE: ICD-10-CM

## 2017-12-11 DIAGNOSIS — E55.9 VITAMIN D DEFICIENCY: ICD-10-CM

## 2017-12-11 PROCEDURE — 99214 OFFICE O/P EST MOD 30 MIN: CPT | Performed by: INTERNAL MEDICINE

## 2017-12-11 NOTE — PROGRESS NOTES
"Endocrinology Clinic Progress Note  PCP: Tete Moncada P.A.-C.    HPI:  Jovita Rosa is a 42 y.o. old patient who comes in today for routine follow up. She feels fine and denies any complaints. Her energy levels have significantly improved. She is currently on 125 µg of levothyroxine along with 25 µg of Cytomel daily.     Her vitamin D levels have also, very nicely from 15 to around 68. She thinks that taking vitamin D has also helped her significantly.    ROS:  Constitutional: No unintentional weight loss  Endo: Denies excessive thirst or frequent urination  All other systems were reviewed and were negative.    Past Medical History:  Patient Active Problem List    Diagnosis Date Noted   • Weight gain 02/03/2017   • IUD (intrauterine device) in place 02/03/2017   • Psoriasis 02/03/2017       Medications:    Current Outpatient Prescriptions:   •  levothyroxine (SYNTHROID) 125 MCG Tab, Take 1 Tab by mouth Every morning on an empty stomach., Disp: 90 Tab, Rfl: 3  •  liothyronine (CYTOMEL) 25 MCG Tab, Take 1 Tab by mouth every day., Disp: 90 Tab, Rfl: 3  •  Cholecalciferol (VITAMIN D3) 99965 UNITS Tab, Take 1 Capsule by mouth every Monday and Thursday., Disp: 16 Tab, Rfl: 0  •  liothyronine (CYTOMEL) 25 MCG Tab, Take 1 Tab by mouth every day., Disp: 90 Tab, Rfl: 3  •  levothyroxine (SYNTHROID) 125 MCG Tab, Take 1 Tab by mouth Every morning on an empty stomach., Disp: 30 Tab, Rfl: 3    Labs: Reviewed    Physical Examination:  Vital signs: /68   Pulse (!) 104   Resp 16   Ht 1.676 m (5' 5.98\")   Wt 79.8 kg (176 lb)   SpO2 99%   Breastfeeding? No   BMI 28.42 kg/m²  Body mass index is 28.42 kg/m².  General: No apparent distress, cooperative  Eyes: No scleral icterus, no discharge, normal eyelids  Neck: No abnormal masses on inspection, normal thyroid exam  Resp: Normal effort, clear to auscultation bilaterally  CVS: Regular rate and rhythm, S1 S2 normal, no murmur  Extremities: No lower extremity " edema  Abdomen: abdominal obesity present  Musculoskeletal: Normal digits and nails  Skin: No rash on visible skin  Psych: Alert and oriented, normal mood and affect, intact memory and able to make informed decisions.    Assessment and Plan:    1. Hypothyroidism, unspecified type  Continue current doses.    2. Vitamin D deficiency  Advised to take 4000 units daily as a maintenance dose.      Return in about 3 months (around 3/11/2018).    Thank you for allowing me to participate in the care of this patient.    Matt Zuniga M.D.    CC:   Tete Moncada P.A.-C.    This note was created using voice recognition software (Dragon). The accuracy of the dictation is limited by the abilities of the software. I have reviewed the note prior to signing, however some errors in grammar and context are still possible. If you have any questions related to this note please do not hesitate to contact our office.

## 2018-01-07 ENCOUNTER — HOSPITAL ENCOUNTER (EMERGENCY)
Facility: MEDICAL CENTER | Age: 43
End: 2018-01-07
Attending: EMERGENCY MEDICINE
Payer: OTHER GOVERNMENT

## 2018-01-07 VITALS
HEIGHT: 65 IN | DIASTOLIC BLOOD PRESSURE: 69 MMHG | WEIGHT: 176.15 LBS | HEART RATE: 95 BPM | BODY MASS INDEX: 29.35 KG/M2 | RESPIRATION RATE: 16 BRPM | TEMPERATURE: 98.3 F | SYSTOLIC BLOOD PRESSURE: 108 MMHG | OXYGEN SATURATION: 92 %

## 2018-01-07 DIAGNOSIS — M54.50 ACUTE BILATERAL LOW BACK PAIN WITHOUT SCIATICA: ICD-10-CM

## 2018-01-07 PROCEDURE — 700111 HCHG RX REV CODE 636 W/ 250 OVERRIDE (IP): Performed by: EMERGENCY MEDICINE

## 2018-01-07 PROCEDURE — 99283 EMERGENCY DEPT VISIT LOW MDM: CPT

## 2018-01-07 PROCEDURE — 96372 THER/PROPH/DIAG INJ SC/IM: CPT

## 2018-01-07 RX ORDER — LEVOTHYROXINE SODIUM 0.12 MG/1
125 TABLET ORAL SEE ADMIN INSTRUCTIONS
Status: SHIPPED | COMMUNITY
End: 2018-03-19 | Stop reason: SDUPTHER

## 2018-01-07 RX ORDER — LIOTHYRONINE SODIUM 25 UG/1
25 TABLET ORAL SEE ADMIN INSTRUCTIONS
Status: SHIPPED | COMMUNITY
End: 2018-03-14

## 2018-01-07 RX ORDER — ACETAMINOPHEN 500 MG
1000 TABLET ORAL EVERY 6 HOURS PRN
Status: SHIPPED | COMMUNITY
End: 2018-08-21

## 2018-01-07 RX ORDER — CYCLOBENZAPRINE HCL 10 MG
10 TABLET ORAL 3 TIMES DAILY PRN
Qty: 30 TAB | Refills: 0 | Status: SHIPPED | OUTPATIENT
Start: 2018-01-07 | End: 2018-03-16

## 2018-01-07 RX ORDER — KETOROLAC TROMETHAMINE 30 MG/ML
30 INJECTION, SOLUTION INTRAMUSCULAR; INTRAVENOUS ONCE
Status: COMPLETED | OUTPATIENT
Start: 2018-01-07 | End: 2018-01-07

## 2018-01-07 RX ORDER — CELECOXIB 200 MG/1
200 CAPSULE ORAL DAILY
Status: SHIPPED | COMMUNITY
End: 2019-12-11

## 2018-01-07 RX ADMIN — KETOROLAC TROMETHAMINE 30 MG: 30 INJECTION, SOLUTION INTRAMUSCULAR at 10:47

## 2018-01-07 ASSESSMENT — PAIN SCALES - GENERAL: PAINLEVEL_OUTOF10: 6

## 2018-01-07 NOTE — ED PROVIDER NOTES
"ED Provider Note    CHIEF COMPLAINT  Chief Complaint   Patient presents with   • Back Pain     \"bulging discs,so bad I couldn't walk today\"       HPI  Jovita Rosa is a 42 y.o. female who presents With a report of having issues with her back for many years but over the past few days she has had considerable discomfort without sciatica symptoms or incontinence. Denies any groin numbness or fever and denies any IVDA. She says that she had a shot of Toradol in the past that worked wonders. States that she has been doing some cross fit-type training but not a lot of stretching. Denies any other complaints and has a TENS unit in place which is given her minimal relief    ROS  Pertinent negative for fever.    PAST MEDICAL HISTORY  Past Medical History:   Diagnosis Date   • Allergy     Seasonal   • Bulging of thoracic intervertebral disc        FAMILY HISTORY  Family History   Problem Relation Age of Onset   • Cancer Mother    • Heart Disease Mother    • Stroke Mother    • Cancer Father    • Lung Disease Father    • Hypertension Brother        SOCIAL HISTORY   reports that she has never smoked. She has never used smokeless tobacco. She reports that she drinks about 1.2 oz of alcohol per week . She reports that she does not use drugs.    SURGICAL HISTORY  History reviewed. No pertinent surgical history.    CURRENT MEDICATIONS  Home Medications     Reviewed by Heri Vergara (Pharmacy Tech) on 01/07/18 at 1043  Med List Status: Complete   Medication Last Dose Status   acetaminophen (TYLENOL) 500 MG Tab 1/6/2018 Active   celecoxib (CELEBREX) 200 MG Cap 1/7/2018 Active   levothyroxine (SYNTHROID) 125 MCG Tab 1/5/2018 Active   liothyronine (CYTOMEL) 25 MCG Tab 1/5/2018 Active                ALLERGIES  No Known Allergies    PHYSICAL EXAM  VITAL SIGNS: /69   Pulse 95   Temp 36.8 °C (98.3 °F)   Resp 16   Ht 1.651 m (5' 5\")   Wt 79.9 kg (176 lb 2.4 oz)   SpO2 92%   BMI 29.31 kg/m²    Constitutional: Well " developed, Well nourished, No acute distress, Non-toxic appearance.   Skin: Normal  Back: Tender in the paralumbar region of L1-S1  Extremities:   Musculoskeletal: pelvis seems to be tilted to the left  Neurologic: Neurologically intact, antalgic gait  Psychiatric:     COURSE & MEDICAL DECISION MAKING  Pertinent Labs & Imaging studies reviewed. (See chart for details)  Given this atraumatic back pain presentation there is no indication for spine films. The patient does not have any signs of cauda equina syndrome or spinal epidural abscess.    I administered Toradol 30 mg intramuscular injection. Patient at this point is going to engage in yoga and stretching as well as follow-up chiropractic and take Flexeril in the evening as needed for additional spasm relief. She will return of any significant change in symptoms and is discharged in stable condition    FINAL IMPRESSION  1. Acute bilateral low back pain without sciatica            Electronically signed by: Damien Corea, 1/7/2018 10:52 AM

## 2018-01-07 NOTE — ED NOTES
"Chief Complaint   Patient presents with   • Back Pain     \"bulging discs,so bad I couldn't walk today\"     /69   Pulse 95   Temp 36.8 °C (98.3 °F)   Resp 16   Ht 1.651 m (5' 5\")   Wt 79.9 kg (176 lb 2.4 oz)   SpO2 92%   BMI 29.31 kg/m²     "

## 2018-01-07 NOTE — DISCHARGE INSTRUCTIONS
Back Pain, Adult  Dr. Jay Mejia  [facility]  [phone]  Back pain is very common in adults. The cause of back pain is rarely dangerous and the pain often gets better over time. The cause of your back pain may not be known. Some common causes of back pain include:  · Strain of the muscles or ligaments supporting the spine.  · Wear and tear (degeneration) of the spinal disks.  · Arthritis.  · Direct injury to the back.  For many people, back pain may return. Since back pain is rarely dangerous, most people can learn to manage this condition on their own.  HOME CARE INSTRUCTIONS  Watch your back pain for any changes. The following actions may help to lessen any discomfort you are feeling:  · Remain active. It is stressful on your back to sit or  one place for long periods of time. Do not sit, drive, or  one place for more than 30 minutes at a time. Take short walks on even surfaces as soon as you are able. Try to increase the length of time you walk each day.  · Exercise regularly as directed by your health care provider. Exercise helps your back heal faster. It also helps avoid future injury by keeping your muscles strong and flexible.  · Do not stay in bed. Resting more than 1-2 days can delay your recovery.  · Pay attention to your body when you bend and lift. The most comfortable positions are those that put less stress on your recovering back. Always use proper lifting techniques, including:  ¨ Bending your knees.  ¨ Keeping the load close to your body.  ¨ Avoiding twisting.  · Find a comfortable position to sleep. Use a firm mattress and lie on your side with your knees slightly bent. If you lie on your back, put a pillow under your knees.  · Avoid feeling anxious or stressed. Stress increases muscle tension and can worsen back pain. It is important to recognize when you are anxious or stressed and learn ways to manage it, such as with exercise.  · Take medicines only as  directed by your health care provider. Over-the-counter medicines to reduce pain and inflammation are often the most helpful. Your health care provider may prescribe muscle relaxant drugs. These medicines help dull your pain so you can more quickly return to your normal activities and healthy exercise.  · Apply ice to the injured area:  ¨ Put ice in a plastic bag.  ¨ Place a towel between your skin and the bag.  ¨ Leave the ice on for 20 minutes, 2-3 times a day for the first 2-3 days. After that, ice and heat may be alternated to reduce pain and spasms.  · Maintain a healthy weight. Excess weight puts extra stress on your back and makes it difficult to maintain good posture.  SEEK MEDICAL CARE IF:  · You have pain that is not relieved with rest or medicine.  · You have increasing pain going down into the legs or buttocks.  · You have pain that does not improve in one week.  · You have night pain.  · You lose weight.  · You have a fever or chills.  SEEK IMMEDIATE MEDICAL CARE IF:   · You develop new bowel or bladder control problems.  · You have unusual weakness or numbness in your arms or legs.  · You develop nausea or vomiting.  · You develop abdominal pain.  · You feel faint.     This information is not intended to replace advice given to you by your health care provider. Make sure you discuss any questions you have with your health care provider.     Document Released: 12/18/2006 Document Revised: 01/08/2016 Document Reviewed: 04/21/2015  [employer] Interactive Patient Education ©2016 [employer]

## 2018-01-17 ENCOUNTER — OFFICE VISIT (OUTPATIENT)
Dept: OTHER | Facility: IMAGING CENTER | Age: 43
End: 2018-01-17
Payer: OTHER GOVERNMENT

## 2018-01-17 VITALS
HEIGHT: 66 IN | OXYGEN SATURATION: 96 % | BODY MASS INDEX: 27.99 KG/M2 | SYSTOLIC BLOOD PRESSURE: 114 MMHG | DIASTOLIC BLOOD PRESSURE: 62 MMHG | TEMPERATURE: 99.3 F | RESPIRATION RATE: 14 BRPM | HEART RATE: 82 BPM | WEIGHT: 174.16 LBS

## 2018-01-17 DIAGNOSIS — M54.50 EPISODIC LOW BACK PAIN: ICD-10-CM

## 2018-01-17 DIAGNOSIS — Z80.41 FAMILY HISTORY OF OVARIAN CANCER: ICD-10-CM

## 2018-01-17 DIAGNOSIS — D72.829 LEUKOCYTOSIS, UNSPECIFIED TYPE: ICD-10-CM

## 2018-01-17 DIAGNOSIS — E03.9 HYPOTHYROIDISM, UNSPECIFIED TYPE: ICD-10-CM

## 2018-01-17 DIAGNOSIS — J30.2 SEASONAL ALLERGIC RHINITIS, UNSPECIFIED CHRONICITY, UNSPECIFIED TRIGGER: ICD-10-CM

## 2018-01-17 DIAGNOSIS — M51.34 BULGING OF THORACIC INTERVERTEBRAL DISC: ICD-10-CM

## 2018-01-17 DIAGNOSIS — M51.36 DDD (DEGENERATIVE DISC DISEASE), LUMBAR: ICD-10-CM

## 2018-01-17 PROCEDURE — 99204 OFFICE O/P NEW MOD 45 MIN: CPT | Performed by: FAMILY MEDICINE

## 2018-01-18 PROBLEM — M51.36 DDD (DEGENERATIVE DISC DISEASE), LUMBAR: Status: ACTIVE | Noted: 2018-01-18

## 2018-01-18 PROBLEM — D72.829 LEUKOCYTOSIS: Status: ACTIVE | Noted: 2018-01-18

## 2018-01-18 PROBLEM — M51.34 BULGING OF THORACIC INTERVERTEBRAL DISC: Status: ACTIVE | Noted: 2018-01-18

## 2018-01-18 PROBLEM — E03.9 HYPOTHYROIDISM: Status: ACTIVE | Noted: 2018-01-18

## 2018-01-18 PROBLEM — Z80.41 FAMILY HISTORY OF OVARIAN CANCER: Status: ACTIVE | Noted: 2018-01-18

## 2018-01-18 PROBLEM — M54.50 EPISODIC LOW BACK PAIN: Status: ACTIVE | Noted: 2018-01-18

## 2018-01-18 PROBLEM — J30.2 SEASONAL ALLERGIC RHINITIS: Status: ACTIVE | Noted: 2018-01-18

## 2018-01-18 PROBLEM — M51.369 DDD (DEGENERATIVE DISC DISEASE), LUMBAR: Status: ACTIVE | Noted: 2018-01-18

## 2018-01-18 RX ORDER — METHYLPREDNISOLONE 4 MG/1
TABLET ORAL
Qty: 21 TAB | Refills: 0 | Status: SHIPPED | OUTPATIENT
Start: 2018-01-18 | End: 2018-04-02

## 2018-01-18 NOTE — PROGRESS NOTES
Chief Complaint   Patient presents with   • Establish Care   • Back Pain       HPI:  42 y.o. female new patient here to establish care and discuss her intermittent back pain issues.   Patient has had issues with back pain. She has intermittent flareups of her back pain symptoms.  She has had imaging with MRI which noted some disc bulging in the thoracic area per report and mild degenerative changes of the lumbar region.  States she had an episode of symptoms of her lower back and was seen in ER and given Flexeril. Sometimes she can have severe pain of the area that occurs episodically.  She has done physical therapy in the past which helped with his symptoms. She did some previous stretching exercises she has not currently done routinely. States when she works out regularly it does tend to help with her symptoms.  She usede Celebrex in the past to help with her symptoms but sometimes finds that does not help.  She would like to try to understand her symptoms better to prevent future symptoms. She is wondering about having a medication to use as needed if she has a flare up again. Uncertain of any specific triggers.   Previously she had flareups about every 2 years but now seems to be about every 6 months. She may notice some more symptoms after sleeping at hotels. 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     Seasonal allergies-has been stable. Uses Claritin or Flonase as needed.    Hypothyroidism-sees her endocrinologist routinely. She is on levothyroxine 125 µg and Cytomel 25 µg daily. She has her blood work per endocrinology. Last TSH was slightly decreased.  Last couple months she has noticed that her hair has been falling out. Last year she feels that her tongue feels fat and scalloped on the sides.   She will plan to follow up with endocrinologist.    Family hx of ovarian cancer in mother.   Mother is 71 yo.  Mother had symptoms of bloating for which she was evaluated.   Patient plans to establish with a gynecologist.   Has done prior genetic screening-EDGAR 2 mutation, primary myelofibrosis.   Father's sister  of ovarian cancer.     Prior labs with elevated WBC. Currently without symptoms of illness.       Review of Systems   Constitutional: Negative for fever, chills and malaise/fatigue.   HENT: Negative for congestion.    Eyes: Negative for pain or vision changes.   Respiratory: Negative for cough and shortness of breath.    Cardiovascular: Negative for leg swelling. No chest pain.   Gastrointestinal: Negative for nausea, vomiting, abdominal pain and diarrhea.   Genitourinary: Negative for dysuria and hematuria.   Skin: Negative for rash.  Negative for skin lesion.   Neurological: Negative for dizziness, focal weakness and headaches.   Endo/Heme/Allergies: Does not bruise/bleed easily.   Psychiatric/Behavioral: Negative for depression.  The patient is not nervous/anxious.        Current Outpatient Prescriptions on File Prior to Visit   Medication Sig Dispense Refill   • levothyroxine (SYNTHROID) 125 MCG Tab Take 125 mcg by mouth See Admin Instructions. Pt takes on Mon-Fri     • liothyronine (CYTOMEL) 25 MCG Tab Take 25 mcg by mouth See Admin Instructions. Pt takes Mon-Fri     • celecoxib (CELEBREX) 200 MG Cap Take 200 mg by mouth every day.     • acetaminophen (TYLENOL) 500 MG Tab Take 1,000 mg by mouth every 6 hours as needed for Moderate Pain.     • cyclobenzaprine (FLEXERIL) 10 MG Tab Take 1 Tab by mouth 3 times a day as needed. 30 Tab 0     No current facility-administered medications on file prior to visit.        No Known Allergies    Past Medical History:   Diagnosis Date   • Allergy     Seasonal   • Bulging of thoracic intervertebral disc    • Hypothyroidism        No past surgical history on file.    Family History   Problem Relation Age of Onset   • Cancer Mother 65     ovarian   • Heart Disease Mother   "  • Stroke Mother      a fib   • Cancer Father      lung, skin- precancerous   • Lung Disease Father    • Hypertension Brother        Social History     Social History   • Marital status:      Spouse name: N/A   • Number of children: N/A   • Years of education: N/A     Occupational History   •       Social History Main Topics   • Smoking status: Never Smoker   • Smokeless tobacco: Never Used   • Alcohol use 1.2 oz/week     2 Glasses of wine per week   • Drug use: No   • Sexual activity: Yes     Partners: Male     Birth control/ protection: IUD     Other Topics Concern   • Not on file     Social History Narrative    , 2 children.        PHYSICAL EXAM:  /62   Pulse 82   Temp 37.4 °C (99.3 °F)   Resp 14   Ht 1.676 m (5' 6\")   Wt 79 kg (174 lb 2.6 oz)   SpO2 96%   BMI 28.11 kg/m²   Constitutional: She appears well-developed and well-nourished. She appears not diaphoretic. No distress.   HENT: Right Ear: External ear normal. Left Ear: External ear normal. Tympanic membranes clear and intact.   Nose: Nose normal.   Mouth/Throat: Oropharynx is clear and moist. Minimal fullness of tongue, pink, slight scalloped edges. No oropharyngeal exudate.     Eyes: Conjunctivae and extraocular motions are normal. Pupils are equal, round, and reactive to light. No scleral icterus.   Neck: Normal range of motion. Neck supple. No thyromegaly present.   Cardiovascular: Normal rate, regular rhythm, normal heart sounds and intact distal pulses.  Exam reveals no gallop and no friction rub.  No murmur heard. No carotid bruits.   Pulmonary/Chest: Effort normal and breath sounds normal. No respiratory distress. She has no wheezes. She has no rales.   Abdominal: Soft. Bowel sounds are normal. She exhibits no distension and no mass. No tenderness. She has no rebound and no guarding.   Lymphadenopathy:  She has no cervical adenopathy.   Neurological: She is alert. She has normal reflexes. No cranial nerve deficit. " She exhibits normal muscle tone.   Skin: Skin is warm and dry. No rash noted. She is not diaphoretic. No erythema.   Psychiatric: She has a normal mood and affect. Her behavior is normal.   Musculoskeletal: She exhibits no edema.  5/5 strength throughout. 2+ DTR bilaterally Popliteal angle - 180 deg bilaterally. Right side of pelvis appears slightly anteriorly rotated. Back: Spine-nontender        ASSESSMENT/PLAN:    42 year old female new patient.     1. Episodic low back pain  Counseled extensively on prevention-evaluate ergonomics, recommend appropriate techniques with activities such as lifting, and recommend routine conditioning exercises with strengthening and stretching. Discussed having Medrol Dosepak on hand for acute symptoms. Reviewed precautions and potential side effects of medication therapy. However, recommend working on preventative factors on a routine basis. Recommend yoga therapy.  MethylPREDNISolone (MEDROL DOSEPAK) 4 MG Tablet Therapy Pack   2. DDD (degenerative disc disease), lumbar     3. Bulging of thoracic intervertebral disc     4. Seasonal allergic rhinitis, unspecified chronicity, unspecified trigger- stable. Continue current medication therapy.     5. Hypothyroidism, unspecified type-stable. Continue current medication therapy. Follow-up with endocrinology.     6. Family history of ovarian cancer  Discussed family history of risk factors and monitoring for any signs or symptoms for further evaluation as needed.     7. Leukocytosis, unspecified type- on prior labs. Currently asymptomatic. Repeat blood work.  CBC WITH DIFFERENTIAL       Return if symptoms worsen or fail to improve.   Plan to follow up after labs as needed.     > 40 minutes face to face time spent with this patient of which > 30  minutes spent on counseling and coordination of care.

## 2018-03-05 ENCOUNTER — OFFICE VISIT (OUTPATIENT)
Dept: OTHER | Facility: IMAGING CENTER | Age: 43
End: 2018-03-05
Payer: OTHER GOVERNMENT

## 2018-03-05 VITALS
TEMPERATURE: 98.2 F | HEART RATE: 63 BPM | DIASTOLIC BLOOD PRESSURE: 68 MMHG | OXYGEN SATURATION: 96 % | SYSTOLIC BLOOD PRESSURE: 110 MMHG

## 2018-03-05 DIAGNOSIS — N64.4 BREAST PAIN, RIGHT: ICD-10-CM

## 2018-03-05 DIAGNOSIS — N61.1 ABSCESS OF RIGHT BREAST: ICD-10-CM

## 2018-03-05 PROCEDURE — 10060 I&D ABSCESS SIMPLE/SINGLE: CPT | Performed by: INTERNAL MEDICINE

## 2018-03-05 PROCEDURE — 99212 OFFICE O/P EST SF 10 MIN: CPT | Mod: 25 | Performed by: INTERNAL MEDICINE

## 2018-03-05 RX ORDER — SULFAMETHOXAZOLE AND TRIMETHOPRIM 800; 160 MG/1; MG/1
1 TABLET ORAL 2 TIMES DAILY
Qty: 10 TAB | Refills: 0 | Status: SHIPPED | OUTPATIENT
Start: 2018-03-05 | End: 2018-03-16

## 2018-03-05 RX ORDER — CEPHALEXIN 500 MG/1
500 CAPSULE ORAL 4 TIMES DAILY
Refills: 0 | Status: CANCELLED | OUTPATIENT
Start: 2018-03-05

## 2018-03-05 NOTE — PATIENT INSTRUCTIONS
Incision and Drainage  Incision and drainage is a surgical procedure to open and drain a fluid-filled sac. The sac may be filled with pus, mucus, or blood. Examples of fluid-filled sacs that may need surgical drainage include cysts, skin infections (abscesses), and red lumps that develop from a ruptured cyst or a small abscess (boils).  You may need this procedure if the affected area is large, painful, infected, or not healing well.  Tell a health care provider about:  · Any allergies you have.  · All medicines you are taking, including vitamins, herbs, eye drops, creams, and over-the-counter medicines.  · Any problems you or family members have had with anesthetic medicines.  · Any blood disorders you have.  · Any surgeries you have had.  · Any medical conditions you have.  · Whether you are pregnant or may be pregnant.  What are the risks?  Generally, this is a safe procedure. However, problems may occur, including:  · Infection.  · Bleeding.  · Allergic reactions to medicines.  · Scarring.  What happens before the procedure?  · You may need an ultrasound or other imaging tests to see how large or deep the fluid-filled sac is.  · You may have blood tests to check for infection.  · You may get a tetanus shot.  · You may be given antibiotic medicine to help prevent infection.  · Follow instructions from your health care provider about eating or drinking restrictions.  · Ask your health care provider about:  ¨ Changing or stopping your regular medicines. This is especially important if you are taking diabetes medicines or blood thinners.  ¨ Taking medicines such as aspirin and ibuprofen. These medicines can thin your blood. Do not take these medicines before your procedure if your health care provider instructs you not to.  · Plan to have someone take you home after the procedure.  · If you will be going home right after the procedure, plan to have someone stay with you for 24 hours.  What happens during the  procedure?  · To reduce your risk of infection:  ¨ Your health care team will wash or sanitize their hands.  ¨ Your skin will be washed with soap.  · You will be given one or more of the following:  ¨ A medicine to help you relax (sedative).  ¨ A medicine to numb the area (local anesthetic).  ¨ A medicine to make you fall asleep (general anesthetic).  · An incision will be made in the top of the fluid-filled sac.  · The contents of the sac may be squeezed out, or a syringe or tube (catheter) may be used to empty the sac.  · The catheter may be left in place for several weeks to drain any fluid. Or, your health care provider may stitch open the edges of the incision to make a long-term opening for drainage (marsupialization).  · The inside of the sac may be washed out (irrigated) with a sterile solution and packed with gauze before it is covered with a bandage (dressing).  The procedure may vary among health care providers and hospitals.  What happens after the procedure?  · Your blood pressure, heart rate, breathing rate, and blood oxygen level will be monitored often until the medicines you were given have worn off.  · Do not drive for 24 hours if you received a sedative.  This information is not intended to replace advice given to you by your health care provider. Make sure you discuss any questions you have with your health care provider.  Document Released: 06/13/2002 Document Revised: 05/25/2017 Document Reviewed: 10/07/2016  [employer] Interactive Patient Education © 2017 [employer]

## 2018-03-05 NOTE — PROGRESS NOTES
Chief Complaint   Patient presents with   • Breast Pain     cyst, Right breast     Subjective:   Jovita Rosa is a 42 y.o. female here today for right breast cyst. Patient usually sees Mago Mcintyre M.D. And is new to me.       Patient complains of worsening in the last week of right breast cyst. Was previously diagnosed with right breast cyst in 2012 when she was in [place] (thinks she may have had an ultrasound). It was pea-sized back then and then about 6 months ago, started to increase in size. Now about walnut sized and causing constant pain. No nipple discharge, or discharge from skin, rash, lactation. Denies any trauma to area. Denies fever, chills. No family hx of breast cancer. Has IUD. Does not feel that symptoms are worse following a monthly pattern. Patient will be travelling for work tomorrow and would like to have the cyst drained. Has not tried any conservative treatments such as warm compresses.     Current medicines (including changes today)  Current Outpatient Prescriptions   Medication Sig Dispense Refill   • sulfamethoxazole-trimethoprim (BACTRIM DS) 800-160 MG tablet Take 1 Tab by mouth 2 times a day. 10 Tab 0   • levothyroxine (SYNTHROID) 125 MCG Tab Take 125 mcg by mouth See Admin Instructions. Pt takes on Mon-Fri     • liothyronine (CYTOMEL) 25 MCG Tab Take 25 mcg by mouth See Admin Instructions. Pt takes Mon-Fri     • MethylPREDNISolone (MEDROL DOSEPAK) 4 MG Tablet Therapy Pack As directed on the packaging label. 21 Tab 0   • celecoxib (CELEBREX) 200 MG Cap Take 200 mg by mouth every day.     • acetaminophen (TYLENOL) 500 MG Tab Take 1,000 mg by mouth every 6 hours as needed for Moderate Pain.     • cyclobenzaprine (FLEXERIL) 10 MG Tab Take 1 Tab by mouth 3 times a day as needed. 30 Tab 0     No current facility-administered medications for this visit.      She  has a past medical history of Allergy; Bulging of thoracic intervertebral disc; and Hypothyroidism.    ROS   No chest pain, no  shortness of breath, no abdominal pain, no diarrhea/constipation, no urinary symptoms.     Objective:     Blood pressure 110/68, pulse 63, temperature 36.8 °C (98.2 °F), SpO2 96 %. There is no height or weight on file to calculate BMI.   Physical Exam:  Alert, oriented in no acute distress.  Eye contact is good, speech goal directed, affect calm  Breast: no axillary tenderness or palpable lymph nodes, no dimpling, inflammation or spontaneous nipple discharge, medial right breast by sternum with approximately 5 cm fluctuant cystic lesion, erythematous surrounding area, scant spontaneous pustular drainage upon slight palpation.     Assessment and Plan:   The following treatment plan was discussed   1. Abscess of right breast   Incision and Drainage done and 5 day course of antibiotics to cover for MRSA/MSSA infection. Discussed possible risk of recurrence and consider plastic surgery consult for cyst removal to insure good cosmetic outcome.  sulfamethoxazole-trimethoprim (BACTRIM DS) 800-160 MG tablet   2. Breast pain, right   R/O additional underlying abscesses or cysts. Will follow-up with results. US-LOCALIZATION BREAST SINGLE LEFT     Procedure: Incision and Drainage  Indication: pain over abscess site  History of allergy to iodine: none  Risks, benefits, and alternatives discussed. Risks including infection, bleeding, bruising, and poor cosmetic outcome and written informed consent obtained.  5 cm x 5 cm on right medial breast, with pore at 3 o'clock position.  Local anesthesia was performed with Lidocaine 2% with epinephrine, prepped with povidone iodine, and draped in the usual sterile fashion.  An incision with #11 blade into fluctuant area with purulent material expressed. The abscess was filled with pustular material. Minimal bleeding with good hemostasis achieved.  The patient tolerated the procedure well and without apparent complications.  The patient was instructed to keep the wound dry and covered for  24-48h and clean thereafter, and warning signs of infection were reviewed.  Recommended that the patient use OTC analgesics as needed for pain.  Followup sooner for any concerns.    Followup: Return if symptoms worsen or fail to improve, for follow-up with PCP, Dr. Mcintyre..         Please note that dictation has been dictated using voice recognition soft ware. I have made every reasonable attempt to correct obvious errors, but I expect that there are errors of grammar and possibly content that I did not discover before finalizing the note.

## 2018-03-09 ENCOUNTER — HOSPITAL ENCOUNTER (OUTPATIENT)
Dept: LAB | Facility: MEDICAL CENTER | Age: 43
End: 2018-03-09
Attending: INTERNAL MEDICINE
Payer: OTHER GOVERNMENT

## 2018-03-09 ENCOUNTER — HOSPITAL ENCOUNTER (OUTPATIENT)
Dept: LAB | Facility: MEDICAL CENTER | Age: 43
End: 2018-03-09
Attending: FAMILY MEDICINE
Payer: OTHER GOVERNMENT

## 2018-03-09 DIAGNOSIS — D72.829 LEUKOCYTOSIS, UNSPECIFIED TYPE: ICD-10-CM

## 2018-03-09 DIAGNOSIS — E03.9 HYPOTHYROIDISM, UNSPECIFIED TYPE: ICD-10-CM

## 2018-03-09 LAB
BASOPHILS # BLD AUTO: 0.7 % (ref 0–1.8)
BASOPHILS # BLD: 0.06 K/UL (ref 0–0.12)
EOSINOPHIL # BLD AUTO: 0.37 K/UL (ref 0–0.51)
EOSINOPHIL NFR BLD: 4.6 % (ref 0–6.9)
ERYTHROCYTE [DISTWIDTH] IN BLOOD BY AUTOMATED COUNT: 39.1 FL (ref 35.9–50)
HCT VFR BLD AUTO: 37.9 % (ref 37–47)
HGB BLD-MCNC: 13.2 G/DL (ref 12–16)
IMM GRANULOCYTES # BLD AUTO: 0.05 K/UL (ref 0–0.11)
IMM GRANULOCYTES NFR BLD AUTO: 0.6 % (ref 0–0.9)
LYMPHOCYTES # BLD AUTO: 2.5 K/UL (ref 1–4.8)
LYMPHOCYTES NFR BLD: 30.8 % (ref 22–41)
MCH RBC QN AUTO: 30.6 PG (ref 27–33)
MCHC RBC AUTO-ENTMCNC: 34.8 G/DL (ref 33.6–35)
MCV RBC AUTO: 87.7 FL (ref 81.4–97.8)
MONOCYTES # BLD AUTO: 0.82 K/UL (ref 0–0.85)
MONOCYTES NFR BLD AUTO: 10.1 % (ref 0–13.4)
NEUTROPHILS # BLD AUTO: 4.32 K/UL (ref 2–7.15)
NEUTROPHILS NFR BLD: 53.2 % (ref 44–72)
NRBC # BLD AUTO: 0 K/UL
NRBC BLD-RTO: 0 /100 WBC
PLATELET # BLD AUTO: 295 K/UL (ref 164–446)
PMV BLD AUTO: 9.4 FL (ref 9–12.9)
RBC # BLD AUTO: 4.32 M/UL (ref 4.2–5.4)
T3 SERPL-MCNC: 139 NG/DL (ref 60–181)
T4 FREE SERPL-MCNC: 0.8 NG/DL (ref 0.53–1.43)
TSH SERPL DL<=0.005 MIU/L-ACNC: 0.87 UIU/ML (ref 0.38–5.33)
WBC # BLD AUTO: 8.1 K/UL (ref 4.8–10.8)

## 2018-03-09 PROCEDURE — 84443 ASSAY THYROID STIM HORMONE: CPT

## 2018-03-09 PROCEDURE — 84439 ASSAY OF FREE THYROXINE: CPT

## 2018-03-09 PROCEDURE — 84480 ASSAY TRIIODOTHYRONINE (T3): CPT

## 2018-03-09 PROCEDURE — 36415 COLL VENOUS BLD VENIPUNCTURE: CPT

## 2018-03-09 PROCEDURE — 85025 COMPLETE CBC W/AUTO DIFF WBC: CPT

## 2018-03-14 ENCOUNTER — OFFICE VISIT (OUTPATIENT)
Dept: ENDOCRINOLOGY | Facility: MEDICAL CENTER | Age: 43
End: 2018-03-14
Payer: OTHER GOVERNMENT

## 2018-03-14 VITALS
HEIGHT: 66 IN | BODY MASS INDEX: 28.61 KG/M2 | DIASTOLIC BLOOD PRESSURE: 74 MMHG | OXYGEN SATURATION: 96 % | SYSTOLIC BLOOD PRESSURE: 124 MMHG | HEART RATE: 81 BPM | WEIGHT: 178 LBS

## 2018-03-14 DIAGNOSIS — E03.9 HYPOTHYROIDISM, UNSPECIFIED TYPE: ICD-10-CM

## 2018-03-14 DIAGNOSIS — E60 ZINC DEFICIENCY: ICD-10-CM

## 2018-03-14 DIAGNOSIS — E55.9 VITAMIN D DEFICIENCY: ICD-10-CM

## 2018-03-14 PROCEDURE — 99214 OFFICE O/P EST MOD 30 MIN: CPT | Performed by: INTERNAL MEDICINE

## 2018-03-16 ENCOUNTER — OFFICE VISIT (OUTPATIENT)
Dept: OTHER | Facility: IMAGING CENTER | Age: 43
End: 2018-03-16
Payer: OTHER GOVERNMENT

## 2018-03-16 ENCOUNTER — HOSPITAL ENCOUNTER (OUTPATIENT)
Facility: MEDICAL CENTER | Age: 43
End: 2018-03-16
Attending: FAMILY MEDICINE
Payer: OTHER GOVERNMENT

## 2018-03-16 VITALS
WEIGHT: 173.28 LBS | TEMPERATURE: 98.6 F | HEIGHT: 66 IN | HEART RATE: 55 BPM | DIASTOLIC BLOOD PRESSURE: 72 MMHG | BODY MASS INDEX: 27.85 KG/M2 | SYSTOLIC BLOOD PRESSURE: 110 MMHG | OXYGEN SATURATION: 95 %

## 2018-03-16 DIAGNOSIS — N63.10 BREAST MASS, RIGHT: ICD-10-CM

## 2018-03-16 DIAGNOSIS — N61.1 ABSCESS OF RIGHT BREAST: ICD-10-CM

## 2018-03-16 DIAGNOSIS — N64.4 BREAST PAIN, RIGHT: ICD-10-CM

## 2018-03-16 DIAGNOSIS — Z87.2 HISTORY OF CYST OF BREAST: ICD-10-CM

## 2018-03-16 PROBLEM — N60.01 BREAST CYST, RIGHT: Status: ACTIVE | Noted: 2018-03-16

## 2018-03-16 PROCEDURE — 87205 SMEAR GRAM STAIN: CPT

## 2018-03-16 PROCEDURE — 87070 CULTURE OTHR SPECIMN AEROBIC: CPT

## 2018-03-16 PROCEDURE — 99213 OFFICE O/P EST LOW 20 MIN: CPT | Performed by: FAMILY MEDICINE

## 2018-03-17 LAB
GRAM STN SPEC: NORMAL
SIGNIFICANT IND 70042: NORMAL
SITE SITE: NORMAL
SOURCE SOURCE: NORMAL

## 2018-03-18 LAB
BACTERIA WND AEROBE CULT: NORMAL
GRAM STN SPEC: NORMAL
SIGNIFICANT IND 70042: NORMAL
SITE SITE: NORMAL
SOURCE SOURCE: NORMAL

## 2018-03-19 ENCOUNTER — APPOINTMENT (RX ONLY)
Dept: URBAN - METROPOLITAN AREA CLINIC 20 | Facility: CLINIC | Age: 43
Setting detail: DERMATOLOGY
End: 2018-03-19

## 2018-03-19 DIAGNOSIS — L81.4 OTHER MELANIN HYPERPIGMENTATION: ICD-10-CM

## 2018-03-19 DIAGNOSIS — L73.8 OTHER SPECIFIED FOLLICULAR DISORDERS: ICD-10-CM

## 2018-03-19 DIAGNOSIS — D485 NEOPLASM OF UNCERTAIN BEHAVIOR OF SKIN: ICD-10-CM

## 2018-03-19 DIAGNOSIS — D22 MELANOCYTIC NEVI: ICD-10-CM

## 2018-03-19 PROBLEM — D48.5 NEOPLASM OF UNCERTAIN BEHAVIOR OF SKIN: Status: ACTIVE | Noted: 2018-03-19

## 2018-03-19 PROBLEM — D22.39 MELANOCYTIC NEVI OF OTHER PARTS OF FACE: Status: ACTIVE | Noted: 2018-03-19

## 2018-03-19 PROCEDURE — 11100: CPT

## 2018-03-19 PROCEDURE — 99213 OFFICE O/P EST LOW 20 MIN: CPT | Mod: 25

## 2018-03-19 PROCEDURE — ? COUNSELING

## 2018-03-19 PROCEDURE — ? BIOPSY BY SHAVE METHOD

## 2018-03-19 RX ORDER — LEVOTHYROXINE SODIUM 0.12 MG/1
125 TABLET ORAL SEE ADMIN INSTRUCTIONS
Qty: 90 TAB | Refills: 3 | Status: SHIPPED | OUTPATIENT
Start: 2018-03-19 | End: 2018-09-12 | Stop reason: SDUPTHER

## 2018-03-19 ASSESSMENT — LOCATION DETAILED DESCRIPTION DERM
LOCATION DETAILED: LEFT CENTRAL MALAR CHEEK
LOCATION DETAILED: LEFT MEDIAL SUPERIOR CHEST
LOCATION DETAILED: MIDDLE STERNUM
LOCATION DETAILED: RIGHT INFERIOR MEDIAL MALAR CHEEK
LOCATION DETAILED: RIGHT CENTRAL MALAR CHEEK

## 2018-03-19 ASSESSMENT — LOCATION ZONE DERM
LOCATION ZONE: FACE
LOCATION ZONE: TRUNK

## 2018-03-19 ASSESSMENT — LOCATION SIMPLE DESCRIPTION DERM
LOCATION SIMPLE: RIGHT CHEEK
LOCATION SIMPLE: CHEST
LOCATION SIMPLE: LEFT CHEEK

## 2018-03-19 NOTE — PROCEDURE: BIOPSY BY SHAVE METHOD
Bill 09113 For Specimen Handling/Conveyance To Laboratory?: no
Render Post-Care Instructions In Note?: yes
Additional Anesthesia Volume In Cc (Will Not Render If 0): 0
Consent: Written and verbal consent were obtained and risks were reviewed including but not limited to scarring, infection, bleeding, scabbing, incomplete removal, nerve damage and allergy to anesthesia.
Curettage Text: The wound bed was treated with curettage after the biopsy was performed.
X Size Of Lesion In Cm: 0.4
Size Of Lesion In Cm: 0.8
Biopsy Method: Personna blade
Electrodesiccation And Curettage Text: The wound bed was treated with electrodesiccation and curettage after the biopsy was performed.
Type Of Destruction Used: Curettage
Hemostasis: Drysol and Electrocautery
Anesthesia Type: 1% lidocaine with 1:200,000 epinephrine and a 1:10 solution of 8.4% sodium bicarbonate and 408mcg clindamycin/ml
Silver Nitrate Text: The wound bed was treated with silver nitrate after the biopsy was performed.
Cryotherapy Text: The wound bed was treated with cryotherapy after the biopsy was performed.
Wound Care: Aquaphor
Lab Facility: 
Notification Instructions: Patient will be notified of biopsy results; however, patient is instructed to call the office if not contacted within 2 weeks.
Anesthesia Volume In Cc: 0.5
Billing Type: Third-Party Bill
Post-Care Instructions: Keep the biopsy site dry overnight, then keep the site clean by washing with soap and water twice daily then covering with Vaseline/Aquaphor and a Band-Aid until healed.
Lab: [id number]
Biopsy Type: H and E
Dressing: Band-Aid
Detail Level: Detailed
Electrodesiccation Text: The wound bed was treated with electrodesiccation after the biopsy was performed.

## 2018-03-20 NOTE — PROGRESS NOTES
Endocrinology Clinic Progress Note  PCP: Mago Mcintyre M.D.    HPI:  Jovita Rosa is a 42 y.o. old patient who comes in today for review of endocrine problems.  Hypothyroidism: Currently she is in both levothyroxine and Cytomel daily. 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  Discussed this option with the patient and she is okay to stop the Cytomel.    She feels well overall however she does want to get tested for zinc deficiency.  ROS:  Constitutional: No unintentional weight loss  Endo: Denies excessive thirst or frequent urination  All other systems were reviewed and were negative.    Past Medical History:  Patient Active Problem List    Diagnosis Date Noted   • Breast pain, right 03/16/2018   • Breast cyst, right 03/16/2018   • Abscess of right breast 03/16/2018   • Episodic low back pain 01/18/2018   • Leukocytosis 01/18/2018   • Seasonal allergic rhinitis 01/18/2018   • Hypothyroidism 01/18/2018   • Family history of ovarian cancer 01/18/2018   • Bulging of thoracic intervertebral disc 01/18/2018   • DDD (degenerative disc disease), lumbar 01/18/2018   • Weight gain 02/03/2017   • IUD (intrauterine device) in place 02/03/2017   • Psoriasis 02/03/2017       Medications:    Current Outpatient Prescriptions:   •  levothyroxine (SYNTHROID) 125 MCG Tab, Take 1 Tab by mouth See Admin Instructions. Pt takes on Mon-Fri, Disp: 90 Tab, Rfl: 3  •  MethylPREDNISolone (MEDROL DOSEPAK) 4 MG Tablet Therapy Pack, As directed on the packaging label., Disp: 21 Tab, Rfl: 0  •  celecoxib (CELEBREX) 200 MG Cap, Take 200 mg by mouth every day., Disp: , Rfl:   •  acetaminophen (TYLENOL) 500 MG Tab, Take 1,000 mg by mouth every 6 hours as needed for Moderate Pain., Disp: , Rfl:     Labs: Reviewed    Physical Examination:  Vital signs: /74   Pulse 81   Ht 1.676  "m (5' 5.98\")   Wt 80.7 kg (178 lb)   SpO2 96%   BMI 28.74 kg/m²  Body mass index is 28.74 kg/m².  General: No apparent distress, cooperative  Eyes: No scleral icterus, no discharge, normal eyelids  Neck: No abnormal masses on inspection, normal thyroid exam  Resp: Normal effort, clear to auscultation bilaterally  CVS: Regular rate and rhythm, S1 S2 normal, no murmur  Extremities: No lower extremity edema  Abdomen: abdominal obesity present  Musculoskeletal: Normal digits and nails  Skin: No rash on visible skin  Psych: Alert and oriented, normal mood and affect, intact memory and able to make informed decisions.    Assessment and Plan:    1. Hypothyroidism, unspecified type  Of the Cytomel. We will repeat the thyroid panel and adjust the dose of levothyroxine accordingly if needed.  - TRIIDOTHYRONINE; Future  - TRIIDOTHYRONINE; Future  - TSH; Future  - FREE THYROXINE; Future    2. Vitamin D deficiency  She has finished the loading doses of vitamin D. We will repeat the levels to make sure that adequate.    3. Zinc deficiency  Will check for zinc deficiency as per patient request.  - ZINC SERUM; Future      Return in about 2 months (around 5/14/2018).    Thank you for allowing me to participate in the care of this patient.    Matt Zuniga M.D.    CC:   Mago Mcintyre M.D.    This note was created using voice recognition software (Dragon). The accuracy of the dictation is limited by the abilities of the software. I have reviewed the note prior to signing, however some errors in grammar and context are still possible. If you have any questions related to this note please do not hesitate to contact our office.   "

## 2018-03-21 ENCOUNTER — OFFICE VISIT (OUTPATIENT)
Dept: OTHER | Facility: IMAGING CENTER | Age: 43
End: 2018-03-21
Payer: OTHER GOVERNMENT

## 2018-03-21 VITALS
DIASTOLIC BLOOD PRESSURE: 80 MMHG | RESPIRATION RATE: 16 BRPM | OXYGEN SATURATION: 95 % | TEMPERATURE: 99.3 F | HEART RATE: 75 BPM | SYSTOLIC BLOOD PRESSURE: 108 MMHG

## 2018-03-21 DIAGNOSIS — N61.1 ABSCESS OF RIGHT BREAST: ICD-10-CM

## 2018-03-21 DIAGNOSIS — N63.10 BREAST MASS, RIGHT: ICD-10-CM

## 2018-03-21 DIAGNOSIS — N64.4 BREAST PAIN, RIGHT: ICD-10-CM

## 2018-03-21 PROCEDURE — 99212 OFFICE O/P EST SF 10 MIN: CPT | Mod: 25 | Performed by: FAMILY MEDICINE

## 2018-03-21 PROCEDURE — 10060 I&D ABSCESS SIMPLE/SINGLE: CPT | Performed by: FAMILY MEDICINE

## 2018-03-21 ASSESSMENT — PATIENT HEALTH QUESTIONNAIRE - PHQ9: CLINICAL INTERPRETATION OF PHQ2 SCORE: 0

## 2018-03-21 ASSESSMENT — PAIN SCALES - GENERAL: PAINLEVEL: NO PAIN

## 2018-03-21 NOTE — PROGRESS NOTES
SUBJECTIVE:    Chief Complaint   Patient presents with   • Cyst     right breast cyst check up       HPI:     Jovita Rosa is a 42 y.o. female here for follow up of right breast cyst.   She previously had area I&D'd. The prior incision site has closed up. Prior erythema has overall improved, but otherwise the area of breast tissue and possible cyst appears about the same since last visit. She does not notice the prior pain she was having, but the area is about the same size. She has not noticed any further drainage of the area.     ROS:  Denies any recent fevers or chills. No nausea or vomiting. No diarrhea. No chest pains or shortness of breath. No lower extremity edema.    Current Outpatient Prescriptions on File Prior to Visit   Medication Sig Dispense Refill   • levothyroxine (SYNTHROID) 125 MCG Tab Take 1 Tab by mouth See Admin Instructions. Pt takes on Mon-Fri 90 Tab 3   • celecoxib (CELEBREX) 200 MG Cap Take 200 mg by mouth every day.     • acetaminophen (TYLENOL) 500 MG Tab Take 1,000 mg by mouth every 6 hours as needed for Moderate Pain.     • MethylPREDNISolone (MEDROL DOSEPAK) 4 MG Tablet Therapy Pack As directed on the packaging label. 21 Tab 0     No current facility-administered medications on file prior to visit.        No Known Allergies    Patient Active Problem List    Diagnosis Date Noted   • Breast mass, right 03/23/2018   • Breast pain, right 03/16/2018   • Breast cyst, right 03/16/2018   • Abscess of right breast 03/16/2018   • Episodic low back pain 01/18/2018   • Leukocytosis 01/18/2018   • Seasonal allergic rhinitis 01/18/2018   • Hypothyroidism 01/18/2018   • Family history of ovarian cancer 01/18/2018   • Bulging of thoracic intervertebral disc 01/18/2018   • DDD (degenerative disc disease), lumbar 01/18/2018   • Weight gain 02/03/2017   • IUD (intrauterine device) in place 02/03/2017   • Psoriasis 02/03/2017       Past Medical History:   Diagnosis Date   • Allergy     Seasonal   •  Bulging of thoracic intervertebral disc    • Hypothyroidism          OBJECTIVE:   /80   Pulse 75   Temp 37.4 °C (99.3 °F)   Resp 16   SpO2 95%   General: Well-developed well-nourished female, no acute distress  Extremities: no cyanosis, clubbing, edema  Skin: Warm and dry  Psych: appropriate mood and affect  Breast: Right side lower medial margin of breast with minimal erythema. Small amount of drainage noted from prior pinpoint spontaneous opening medially.  Underlying and surrounding thickening of breast tissue of 1-2 cm x 2-3 cm with slight region of fluctuance of superficial central region appears stable, but persistent.     PROCEDURE NOTE: After risk and benefits discussed patient was consented. Area was cleansed with iodine. Right lower medial breast edge was injected with 1.5 mL of lidocaine 2% without epinephrine. Moderate amount of watery purulent fluid and whitish sebaceous material was expressed. Wound was packed and dressed. Patient tolerated procedure well.  Aftercare instructions given.     Component Results     Component   Gram Stain Result   Rare WBCs.   Rare Gram positive cocci.     Significant Indicator   NEG    Source   WND    Site   Right Breast    Culture Result Wound   Moderate growth mixed skin martín.        GRAM STAIN (Order [id number]) - Reflex for Order [id number]   Component Results     Component   Significant Indicator   .    Source   WND    Site   Right Breast    Gram Stain Result   Rare WBCs.   Rare Gram positive cocci.        ASSESSMENT/PLAN:    42 y.o.female    1. Abscess of right breast- stable. Repeat I & D completed today. Mild purulent discharge and sebaceous material noted.   -Discussed current recommendations and expectations of course of treatment. Discussed may need further surgical referral. Continue to monitor. Follow up in 2 days.  CONSENT FOR AMB SURGERY/PROCEDURE   2. Breast pain, right- improved.      3. Breast mass, right- stable. Has imaging scheduled.           Return in about 2 days (around 3/23/2018).    This medical record contains text that has been entered with the assistance of computer voice recognition and dictation software.  Therefore, it may contain unintended errors in text, spelling, punctuation, or grammar.

## 2018-03-23 ENCOUNTER — OFFICE VISIT (OUTPATIENT)
Dept: OTHER | Facility: IMAGING CENTER | Age: 43
End: 2018-03-23
Payer: OTHER GOVERNMENT

## 2018-03-23 VITALS
SYSTOLIC BLOOD PRESSURE: 106 MMHG | HEART RATE: 80 BPM | OXYGEN SATURATION: 95 % | RESPIRATION RATE: 16 BRPM | TEMPERATURE: 98.8 F | DIASTOLIC BLOOD PRESSURE: 70 MMHG

## 2018-03-23 DIAGNOSIS — N64.4 BREAST PAIN, RIGHT: ICD-10-CM

## 2018-03-23 DIAGNOSIS — N61.1 ABSCESS OF RIGHT BREAST: ICD-10-CM

## 2018-03-23 DIAGNOSIS — N63.10 BREAST MASS, RIGHT: ICD-10-CM

## 2018-03-23 NOTE — PROGRESS NOTES
SUBJECTIVE:      Chief Complaint   Patient presents with   • Abscess     right breast       HPI:     Jovita Rosa is a 42 y.o. female here for follow-up of I&D of right medial breast cyst.   Overall has been doing well. She had repeat I&D of breath since last visit.   Has not noticed a significant amount of further drainage from the area.  She was treated with a course of antibiotic therapy.  Prior culture of drainage was unremarkable.  Overall does not notice a significant pain of the area. Per erythema is improved.   Denies any fevers or chills.    ROS:  Denies any recent fevers or chills. No nausea or vomiting. No diarrhea. No chest pains or shortness of breath. No lower extremity edema.    Current Outpatient Prescriptions on File Prior to Visit   Medication Sig Dispense Refill   • levothyroxine (SYNTHROID) 125 MCG Tab Take 1 Tab by mouth See Admin Instructions. Pt takes on Mon-Fri 90 Tab 3   • MethylPREDNISolone (MEDROL DOSEPAK) 4 MG Tablet Therapy Pack As directed on the packaging label. 21 Tab 0   • celecoxib (CELEBREX) 200 MG Cap Take 200 mg by mouth every day.     • acetaminophen (TYLENOL) 500 MG Tab Take 1,000 mg by mouth every 6 hours as needed for Moderate Pain.       No current facility-administered medications on file prior to visit.        No Known Allergies    Patient Active Problem List    Diagnosis Date Noted   • Breast pain, right 03/16/2018   • Breast cyst, right 03/16/2018   • Abscess of right breast 03/16/2018   • Episodic low back pain 01/18/2018   • Leukocytosis 01/18/2018   • Seasonal allergic rhinitis 01/18/2018   • Hypothyroidism 01/18/2018   • Family history of ovarian cancer 01/18/2018   • Bulging of thoracic intervertebral disc 01/18/2018   • DDD (degenerative disc disease), lumbar 01/18/2018   • Weight gain 02/03/2017   • IUD (intrauterine device) in place 02/03/2017   • Psoriasis 02/03/2017       Past Medical History:   Diagnosis Date   • Allergy     Seasonal   • Bulging of thoracic  intervertebral disc    • Hypothyroidism          OBJECTIVE:   /70   Pulse 80   Temp 37.1 °C (98.8 °F)   Resp 16   SpO2 95%   General: Well-developed well-nourished female, no acute distress  Extremities: no cyanosis, clubbing, edema  Skin: Warm and dry  Psych: appropriate mood and affect  Breast: Right side lower medial margin of breast with improved erythema. Small amount of drainage still noted from inicsion site. Underlying and surrounding thickening of breast tissue of 1-2 cm x 2-3 cm with slight fluctuance of superficial central region.    PROCEDURE NOTE: After risk and benefits discussed patient was consented. Area was cleansed alcohol. Right lower medial breast edge was injected with 1.0 mL of lidocaine 2% without epinephrine. Small amount of watery purulent fluid and whitish sebaceous material was further expressed. Prior  ~2-3 mm incision site was extended slightly to 3-4 mm. Wound was packed and dressed. Patient tolerated well.      ASSESSMENT/PLAN:    42 y.o.female with right side breast abscess.     1. Abscess of right breast- continues to drain, but improving.  Sebaceous material noted which was further expressed today along with purulent fluid.   Had prior treatment on antibiotic therapy.    Will continue to monitor at this time.     2. Breast pain, right- improved.      3. Breast mass, right- prior history of cyst and growth. Component due to current inflammation.   Patient is scheduled for imaging.          Return in about 1 week (around 3/30/2018).    This medical record contains text that has been entered with the assistance of computer voice recognition and dictation software.  Therefore, it may contain unintended errors in text, spelling, punctuation, or grammar.

## 2018-03-26 ENCOUNTER — OFFICE VISIT (OUTPATIENT)
Dept: OTHER | Facility: IMAGING CENTER | Age: 43
End: 2018-03-26
Payer: OTHER MISCELLANEOUS

## 2018-03-26 VITALS — HEART RATE: 66 BPM | TEMPERATURE: 99.2 F | OXYGEN SATURATION: 95 %

## 2018-03-26 DIAGNOSIS — N63.10 BREAST MASS, RIGHT: ICD-10-CM

## 2018-03-26 DIAGNOSIS — N61.1 ABSCESS OF RIGHT BREAST: ICD-10-CM

## 2018-03-26 DIAGNOSIS — N64.4 BREAST PAIN, RIGHT: ICD-10-CM

## 2018-03-26 NOTE — PROGRESS NOTES
SUBJECTIVE:      Chief Complaint   Patient presents with   • Abscess     right breast       HPI:     Jovita Rosa is a 42 y.o. female here for follow-up of I&D of right medial breast cyst abscess.   Has noticed much more improvements. Minimal drainage. Previous wound was packed.   No significant erythema. No fevers/chills.       ROS:  Denies any recent fevers or chills. No chest pains or shortness of breath. No lower extremity edema.    Current Outpatient Prescriptions on File Prior to Visit   Medication Sig Dispense Refill   • levothyroxine (SYNTHROID) 125 MCG Tab Take 1 Tab by mouth See Admin Instructions. Pt takes on Mon-Fri 90 Tab 3   • MethylPREDNISolone (MEDROL DOSEPAK) 4 MG Tablet Therapy Pack As directed on the packaging label. 21 Tab 0   • celecoxib (CELEBREX) 200 MG Cap Take 200 mg by mouth every day.     • acetaminophen (TYLENOL) 500 MG Tab Take 1,000 mg by mouth every 6 hours as needed for Moderate Pain.       No current facility-administered medications on file prior to visit.        No Known Allergies    Patient Active Problem List    Diagnosis Date Noted   • Breast mass, right 03/23/2018   • Breast pain, right 03/16/2018   • Breast cyst, right 03/16/2018   • Abscess of right breast 03/16/2018   • Episodic low back pain 01/18/2018   • Leukocytosis 01/18/2018   • Seasonal allergic rhinitis 01/18/2018   • Hypothyroidism 01/18/2018   • Family history of ovarian cancer 01/18/2018   • Bulging of thoracic intervertebral disc 01/18/2018   • DDD (degenerative disc disease), lumbar 01/18/2018   • Weight gain 02/03/2017   • IUD (intrauterine device) in place 02/03/2017   • Psoriasis 02/03/2017       Past Medical History:   Diagnosis Date   • Allergy     Seasonal   • Bulging of thoracic intervertebral disc    • Hypothyroidism        OBJECTIVE:   Pulse 66   Temp 37.3 °C (99.2 °F)   SpO2 95%   General: Well-developed well-nourished female, no acute distress  Extremities: no cyanosis, clubbing, edema  Skin: Warm  and dry  Psych: appropriate mood and affect  Breast: Right side lower medial margin of breast appears much improved without significant erythema. Minimal amount of drainage from incision site expressed. Underlying and surrounding thickening of breast tissue appears decreased in size ~ 1.5 x 2 cm.        ASSESSMENT/PLAN:    42 y.o.female with right side breast abscess.     1. Abscess of right breast-much improved.   Had prior treatment on antibiotic therapy.  She will plan to follow up as needed after imaging complete. Discussed that she may need complete excision of cyst in future and reviewed possible regrowth of area or infection in future.     2. Breast pain, right- improved.      3. Breast mass, right- prior history of cyst and growth. Component due to current inflammation which appears improving, size of area is decreased on exam today.   She will follow up as scheduled for imaging.   She has gynecology appointment next week.         Return if symptoms worsen or fail to improve.    This medical record contains text that has been entered with the assistance of computer voice recognition and dictation software.  Therefore, it may contain unintended errors in text, spelling, punctuation, or grammar.

## 2018-03-28 ENCOUNTER — APPOINTMENT (RX ONLY)
Dept: URBAN - METROPOLITAN AREA CLINIC 20 | Facility: CLINIC | Age: 43
Setting detail: DERMATOLOGY
End: 2018-03-28

## 2018-03-28 NOTE — HPI: COSMETIC CONSULTATION
Additional History: \\n\\n\\nConcerned with texture of her skin on cheeks. Appears she has some inflammation from redness. Focus will be on sunspots as well as redness on cheeks, nose and chin. \\nRecommended series of 3 BBL see quote. \\n\\nNeed to ask about colored meds and if she needs Rx.

## 2018-04-02 ENCOUNTER — HOSPITAL ENCOUNTER (OUTPATIENT)
Facility: MEDICAL CENTER | Age: 43
End: 2018-04-02
Attending: NURSE PRACTITIONER
Payer: OTHER GOVERNMENT

## 2018-04-02 ENCOUNTER — GYNECOLOGY VISIT (OUTPATIENT)
Dept: OBGYN | Facility: CLINIC | Age: 43
End: 2018-04-02
Payer: OTHER GOVERNMENT

## 2018-04-02 VITALS — SYSTOLIC BLOOD PRESSURE: 100 MMHG | DIASTOLIC BLOOD PRESSURE: 70 MMHG | BODY MASS INDEX: 12.91 KG/M2 | WEIGHT: 80 LBS

## 2018-04-02 DIAGNOSIS — Z01.419 WELL WOMAN EXAM: ICD-10-CM

## 2018-04-02 DIAGNOSIS — Z12.4 ROUTINE CERVICAL SMEAR: ICD-10-CM

## 2018-04-02 DIAGNOSIS — Z01.419 WELL WOMAN EXAM: Primary | ICD-10-CM

## 2018-04-02 DIAGNOSIS — Z11.51 ENCOUNTER FOR SCREENING FOR HUMAN PAPILLOMAVIRUS (HPV): ICD-10-CM

## 2018-04-02 PROCEDURE — 88175 CYTOPATH C/V AUTO FLUID REDO: CPT

## 2018-04-02 PROCEDURE — 87624 HPV HI-RISK TYP POOLED RSLT: CPT

## 2018-04-02 PROCEDURE — 99386 PREV VISIT NEW AGE 40-64: CPT | Performed by: NURSE PRACTITIONER

## 2018-04-02 NOTE — PROGRESS NOTES
Subjective:      Jovita Rosa is a 42 y.o. female who presents with Gynecologic Exam    HPI Comments: Pt presents for well woman exam. Pt has no complaints. No LMP recorded. Patient has had an implant.    She is , currently just 1 sexual partner. Uses Mirena for birth control method. Placed in 2014, discussed need to replace in 2019 at the 5 year eric. Patient agreeable.  Has some concerns with vaginal tightness and partner dissatisfaction with intercourse. Discussed normalcy of vaginal wall changes due to childbirth and age. Discussed pelvic floor exercises, specifically kegels. If no improvement, may need pelvic floor therapy. Appropriate vaginal tone noted on exam.     Review of Systems   Pertinent positives documented in HPI and all other systems reviewed & are negative    All PMH, PSH, allergies, social history and FH reviewed and updated today:  Past Medical History:   Diagnosis Date   • Allergy     Seasonal   • Bulging of thoracic intervertebral disc    • Hypothyroidism      History reviewed. No pertinent surgical history.  Patient has no known allergies.  Social History     Social History   • Marital status:      Spouse name: N/A   • Number of children: N/A   • Years of education: N/A     Occupational History   •       Social History Main Topics   • Smoking status: Never Smoker   • Smokeless tobacco: Never Used   • Alcohol use 1.2 oz/week     2 Glasses of wine per week   • Drug use: No   • Sexual activity: Yes     Partners: Male     Birth control/ protection: IUD     Other Topics Concern   • Not on file     Social History Narrative    , 2 children.      Family History   Problem Relation Age of Onset   • Cancer Mother 65     ovarian   • Heart Disease Mother    • Stroke Mother      a fib   • Hypertension Mother    • Cancer Father      lung, skin- precancerous   • Lung Disease Father    • Hypertension Brother      Medications:   Current Outpatient Prescriptions Ordered in [facility]    Medication Sig Dispense Refill   • levothyroxine (SYNTHROID) 125 MCG Tab Take 1 Tab by mouth See Admin Instructions. Pt takes on Mon-Fri 90 Tab 3   • celecoxib (CELEBREX) 200 MG Cap Take 200 mg by mouth every day.     • acetaminophen (TYLENOL) 500 MG Tab Take 1,000 mg by mouth every 6 hours as needed for Moderate Pain.       No current Epic-ordered facility-administered medications on file.           Objective:   Vital measurements:  Blood pressure 100/70, weight 36.3 kg (80 lb), not currently breastfeeding.  Body mass index is 12.91 kg/m². (Goal BM I>18 <25)    Physical Exam   Nursing note and vitals reviewed.  Constitutional: She is oriented to person, place, and time. She appears well-developed and well-nourished. No distress.     HEENT:   Head: Normocephalic and atraumatic.   Right Ear: External ear normal.   Left Ear: External ear normal.   Nose: Nose normal.   Eyes: Conjunctivae and EOM are normal. Pupils are equal, round, and reactive to light. No scleral icterus.     Neck: Normal range of motion. Neck supple. No tracheal deviation present. No thyromegaly present.     Pulmonary/Chest: Effort normal and breath sounds normal. No respiratory distress. She has no wheezes. She has no rales. She exhibits no tenderness.     Cardiovascular: Regular, rate and rhythm. No JVD.    Abdominal: Soft. Bowel sounds are normal. She exhibits no distension and no mass. No tenderness. She has no rebound and no guarding.     Breast:  Symmetrical, normal consistency without masses., No dimpling or skin changes, Normal nipples without discharge, negative, Inspection negative. No nipple discharge or bleeding, No masses.  Had cyst removed and I&D done last month on her right breast, 5 o'clock region. Wound is healing appropriately. No pain or discharge noted. No longer on antibiotics.    Genitourinary:  Pelvic exam was performed with patient supine.  External genitalia with no abnormal pigmentation, labial fusion,rash, tenderness,  lesion or injury to the labia bilaterally.  Vagina is moist with no lesions, foul discharge, erythema, tenderness or bleeding. No foreign body around the vagina or signs of injury.   Cervix exhibits no motion tenderness, no discharge and no friability.   Uterus is mid-position, not deviated, not enlarged, not fixed and not tender.  Right adnexum displays no mass, no tenderness and no fullness. Left adnexum displays no mass, no tenderness and no fullness.     Musculoskeletal: Normal range of motion. She exhibits no edema and no tenderness.     Lymphadenopathy: She has no cervical adenopathy.     Neurological: She is alert and oriented to person, place, and time. She exhibits normal muscle tone.     Skin: Skin is warm and dry. No rash noted. She is not diaphoretic. No erythema. No pallor.     Psychiatric: She has a normal mood and affect. Her behavior is normal. Judgment and thought content normal.               Assessment:     1. Well woman exam  THINPREP PAP WITH HPV   2. Routine cervical smear  THINPREP PAP WITH HPV   3. Encounter for screening for human papillomavirus (HPV)  THINPREP PAP WITH HPV         Plan:   Pap and physical exam performed  Monthly SBE.  Counseling: breast self exam, mammography screening, use and side effects of OCPs, family planning choices, menopause and adequate intake of calcium and vitamin D  Encourage exercise and proper diet.  Mammograms starting @ age 40 annually.  See medications and orders placed in encounter report.    HPI    ROS       Objective:     /70   Wt 36.3 kg (80 lb)   Breastfeeding? No   BMI 12.91 kg/m²      Physical Exam     Assessment/Plan:     1. Well woman exam  - THINPREP PAP WITH HPV; Future    2. Routine cervical smear  - THINPREP PAP WITH HPV; Future    3. Encounter for screening for human papillomavirus (HPV)  - THINPREP PAP WITH HPV; Future

## 2018-04-04 LAB
CYTOLOGY REG CYTOL: NORMAL
HPV HR 12 DNA CVX QL NAA+PROBE: NEGATIVE
HPV16 DNA SPEC QL NAA+PROBE: NEGATIVE
HPV18 DNA SPEC QL NAA+PROBE: NEGATIVE
SPECIMEN SOURCE: NORMAL

## 2018-04-11 ENCOUNTER — HOSPITAL ENCOUNTER (OUTPATIENT)
Dept: RADIOLOGY | Facility: MEDICAL CENTER | Age: 43
End: 2018-04-11
Attending: INTERNAL MEDICINE
Payer: OTHER GOVERNMENT

## 2018-04-11 DIAGNOSIS — N64.4 BREAST PAIN, RIGHT: ICD-10-CM

## 2018-04-11 PROCEDURE — 77066 DX MAMMO INCL CAD BI: CPT

## 2018-04-11 PROCEDURE — 76642 ULTRASOUND BREAST LIMITED: CPT | Mod: RT

## 2018-04-20 ENCOUNTER — HOSPITAL ENCOUNTER (OUTPATIENT)
Dept: LAB | Facility: MEDICAL CENTER | Age: 43
End: 2018-04-20
Attending: INTERNAL MEDICINE
Payer: OTHER GOVERNMENT

## 2018-04-20 DIAGNOSIS — E03.9 HYPOTHYROIDISM, UNSPECIFIED TYPE: ICD-10-CM

## 2018-04-20 DIAGNOSIS — E60 ZINC DEFICIENCY: ICD-10-CM

## 2018-04-20 LAB
T4 FREE SERPL-MCNC: 1.04 NG/DL (ref 0.53–1.43)
TSH SERPL DL<=0.005 MIU/L-ACNC: 2.64 UIU/ML (ref 0.38–5.33)

## 2018-04-20 PROCEDURE — 84439 ASSAY OF FREE THYROXINE: CPT

## 2018-04-20 PROCEDURE — 84480 ASSAY TRIIODOTHYRONINE (T3): CPT

## 2018-04-20 PROCEDURE — 36415 COLL VENOUS BLD VENIPUNCTURE: CPT

## 2018-04-20 PROCEDURE — 84443 ASSAY THYROID STIM HORMONE: CPT

## 2018-04-20 PROCEDURE — 84630 ASSAY OF ZINC: CPT

## 2018-04-21 LAB — T3 SERPL-MCNC: 96.6 NG/DL (ref 60–181)

## 2018-04-25 LAB — ZINC BLD-MCNC: 537.5 UG/DL (ref 440–860)

## 2018-04-27 ENCOUNTER — TELEPHONE (OUTPATIENT)
Dept: OTHER | Facility: IMAGING CENTER | Age: 43
End: 2018-04-27

## 2018-04-27 NOTE — TELEPHONE ENCOUNTER
----- Message from Mago Mcintyre M.D. sent at 4/27/2018  1:09 PM PDT -----  Regarding: FW: breast imaging results  Does not appear mychart message was read. Please notify pt and see if she has any questions.     SP  ----- Message -----  From: Mago Mcintyre M.D.  Sent: 4/27/2018   5:00 AM  To: Jovita Rosa  Subject: breast imaging results                           Danny Hussein,     I believe the radiologist gave you the results of your breast imaging.   Appears no concerning findings. However, if you notice any further changes or growth in the area, please let me know.     Let me know if you have any further questions.       Dr. Mcintyre

## 2018-04-30 ENCOUNTER — OFFICE VISIT (OUTPATIENT)
Dept: ENDOCRINOLOGY | Facility: MEDICAL CENTER | Age: 43
End: 2018-04-30
Payer: OTHER GOVERNMENT

## 2018-04-30 VITALS
OXYGEN SATURATION: 95 % | HEART RATE: 83 BPM | HEIGHT: 66 IN | WEIGHT: 179 LBS | SYSTOLIC BLOOD PRESSURE: 100 MMHG | BODY MASS INDEX: 28.77 KG/M2 | DIASTOLIC BLOOD PRESSURE: 60 MMHG

## 2018-04-30 DIAGNOSIS — E06.3 HYPOTHYROIDISM DUE TO HASHIMOTO'S THYROIDITIS: ICD-10-CM

## 2018-04-30 DIAGNOSIS — E03.8 HYPOTHYROIDISM DUE TO HASHIMOTO'S THYROIDITIS: ICD-10-CM

## 2018-04-30 DIAGNOSIS — E55.9 VITAMIN D DEFICIENCY: ICD-10-CM

## 2018-04-30 PROCEDURE — 99214 OFFICE O/P EST MOD 30 MIN: CPT | Performed by: INTERNAL MEDICINE

## 2018-04-30 RX ORDER — CHOLECALCIFEROL (VITAMIN D3) 50 MCG
TABLET ORAL DAILY
COMMUNITY

## 2018-04-30 NOTE — PROGRESS NOTES
"Endocrinology Clinic Progress Note  PCP: Mago Mcintyre M.D.    HPI:  Jovita Rosa is a 42 y.o. old patient who comes in today for review of endocrine problems.  States feeling great and has started working out at the gym.  Hypothyroid:  Controlled on Levothyroxine 125 mcg taking Monday through Friday.  Vitamin D Deficiency:  Vitamin D level up from 15 to 68 on current supplementation.  ROS:  Constitutional: No unintentional weight loss  Endo: Denies excessive thirst or frequent urination  All other systems were reviewed and were negative.    Past Medical History:  Patient Active Problem List    Diagnosis Date Noted   • Breast mass, right 03/23/2018   • Breast pain, right 03/16/2018   • Breast cyst, right 03/16/2018   • Abscess of right breast 03/16/2018   • Episodic low back pain 01/18/2018   • Leukocytosis 01/18/2018   • Seasonal allergic rhinitis 01/18/2018   • Hypothyroidism 01/18/2018   • Family history of ovarian cancer 01/18/2018   • Bulging of thoracic intervertebral disc 01/18/2018   • DDD (degenerative disc disease), lumbar 01/18/2018   • Weight gain 02/03/2017   • IUD (intrauterine device) in place 02/03/2017   • Psoriasis 02/03/2017       Medications:    Current Outpatient Prescriptions:   •  Cholecalciferol (VITAMIN D) 2000 UNIT Tab, Take  by mouth every day., Disp: , Rfl:   •  levothyroxine (SYNTHROID) 125 MCG Tab, Take 1 Tab by mouth See Admin Instructions. Pt takes on Mon-Fri, Disp: 90 Tab, Rfl: 3  •  celecoxib (CELEBREX) 200 MG Cap, Take 200 mg by mouth every day., Disp: , Rfl:   •  acetaminophen (TYLENOL) 500 MG Tab, Take 1,000 mg by mouth every 6 hours as needed for Moderate Pain., Disp: , Rfl:     Labs: Reviewed    Physical Examination:  Vital signs: /60   Pulse 83   Ht 1.676 m (5' 6\")   SpO2 95%  There is no height or weight on file to calculate BMI. Patient's body mass index is unknown because there is no height or weight on file. Exercise and nutrition counseling were performed at " this visit.  General: No apparent distress, cooperative  Eyes: No scleral icterus, no discharge, normal eyelids  Neck: No abnormal masses on inspection, normal thyroid exam  Resp: Normal effort, clear to auscultation bilaterally  CVS: Regular rate and rhythm, S1 S2 normal, no murmur  Extremities: No lower extremity edema  Abdomen: abdominal obesity present  Musculoskeletal: Normal digits and nails  Skin: No rash on visible skin  Psych: Alert and oriented, normal mood and affect, intact memory and able to make informed decisions.    Assessment and Plan:    1. Hypothyroidism due to Hashimoto's thyroiditis  Continue levothyroxine 125 µg daily on every day of the week now. She is not on Cytomel since the last visit with me. She feels overall fine. Denies any other complaints.    2. Vitamin D deficiency  Continue 2090-8208 units daily of vitamin D as the maintenance dose.    Return in about 4 months (around 8/30/2018).    Thank you for allowing me to participate in the care of this patient.    Matt Zuniga M.D.    CC:   Mago Mcintyre M.D.    This note was created using voice recognition software (Dragon). The accuracy of the dictation is limited by the abilities of the software. I have reviewed the note prior to signing, however some errors in grammar and context are still possible. If you have any questions related to this note please do not hesitate to contact our office.

## 2018-05-03 ENCOUNTER — APPOINTMENT (OUTPATIENT)
Dept: OTHER | Facility: IMAGING CENTER | Age: 43
End: 2018-05-03

## 2018-05-09 ENCOUNTER — APPOINTMENT (OUTPATIENT)
Dept: OTHER | Facility: IMAGING CENTER | Age: 43
End: 2018-05-09

## 2018-05-11 ENCOUNTER — OFFICE VISIT (OUTPATIENT)
Dept: OTHER | Facility: IMAGING CENTER | Age: 43
End: 2018-05-11
Payer: OTHER GOVERNMENT

## 2018-05-11 VITALS
HEIGHT: 66 IN | WEIGHT: 176.81 LBS | OXYGEN SATURATION: 96 % | HEART RATE: 68 BPM | SYSTOLIC BLOOD PRESSURE: 104 MMHG | BODY MASS INDEX: 28.42 KG/M2 | RESPIRATION RATE: 12 BRPM | TEMPERATURE: 99.2 F | DIASTOLIC BLOOD PRESSURE: 70 MMHG

## 2018-05-11 DIAGNOSIS — M67.432 GANGLION CYST OF DORSUM OF LEFT WRIST: ICD-10-CM

## 2018-05-11 PROCEDURE — 99213 OFFICE O/P EST LOW 20 MIN: CPT | Performed by: FAMILY MEDICINE

## 2018-05-14 NOTE — PROGRESS NOTES
"SUBJECTIVE:        Chief Complaint   Patient presents with   • Cyst     2 months       HPI:     Joivta Rosa is a 42 y.o. female here for symptoms of cyst of  left wrist for past couple months. Feels it has gotten bigger and has not yet resolved.   She does do some exercise activities involving use of her wrists. No trauma to area otherwise. Denies any redness of area or pain, but does feel it bothers her more and seems more noticeable, thus would like to have it treated.       ROS:  Denies any recent fevers or chills. No lower extremity edema.    Current Outpatient Prescriptions on File Prior to Visit   Medication Sig Dispense Refill   • levothyroxine (SYNTHROID) 125 MCG Tab Take 1 Tab by mouth See Admin Instructions. Pt takes on Mon-Fri 90 Tab 3   • celecoxib (CELEBREX) 200 MG Cap Take 200 mg by mouth every day.     • Cholecalciferol (VITAMIN D) 2000 UNIT Tab Take  by mouth every day.     • acetaminophen (TYLENOL) 500 MG Tab Take 1,000 mg by mouth every 6 hours as needed for Moderate Pain.       No current facility-administered medications on file prior to visit.        No Known Allergies    Patient Active Problem List    Diagnosis Date Noted   • Breast mass, right 03/23/2018   • Breast pain, right 03/16/2018   • Breast cyst, right 03/16/2018   • Abscess of right breast 03/16/2018   • Episodic low back pain 01/18/2018   • Leukocytosis 01/18/2018   • Seasonal allergic rhinitis 01/18/2018   • Hypothyroidism 01/18/2018   • Family history of ovarian cancer 01/18/2018   • Bulging of thoracic intervertebral disc 01/18/2018   • DDD (degenerative disc disease), lumbar 01/18/2018   • Weight gain 02/03/2017   • IUD (intrauterine device) in place 02/03/2017   • Psoriasis 02/03/2017       Past Medical History:   Diagnosis Date   • Allergy     Seasonal   • Bulging of thoracic intervertebral disc    • Hypothyroidism        OBJECTIVE:   /70   Pulse 68   Temp 37.3 °C (99.2 °F)   Resp 12   Ht 1.676 m (5' 6\")   Wt 80.2 " kg (176 lb 12.9 oz)   SpO2 96%   BMI 28.54 kg/m²   General: Well-developed well-nourished female, no acute distress  Extremities: no cyanosis, clubbing, edema. Dorsum of  left wrist with elevated soft tissue mass, appears benign, nontender, no erythema. Normal ROM of wrist.   Skin: Warm and dry  Psych: appropriate mood and affect      ASSESSMENT/PLAN:    42 y.o.female    1. Ganglion cyst of dorsum of  left wrist- currently stable.     Reviewed options of therapy.   Patient scheduled for procedure next week.     Return in about 1 week (around 5/18/2018).    This medical record contains text that has been entered with the assistance of computer voice recognition and dictation software.  Therefore, it may contain unintended errors in text, spelling, punctuation, or grammar.

## 2018-05-18 ENCOUNTER — OFFICE VISIT (OUTPATIENT)
Dept: OTHER | Facility: IMAGING CENTER | Age: 43
End: 2018-05-18
Payer: OTHER GOVERNMENT

## 2018-05-18 VITALS
TEMPERATURE: 99 F | DIASTOLIC BLOOD PRESSURE: 64 MMHG | RESPIRATION RATE: 12 BRPM | SYSTOLIC BLOOD PRESSURE: 110 MMHG | HEART RATE: 72 BPM | OXYGEN SATURATION: 97 %

## 2018-05-18 DIAGNOSIS — M67.432 GANGLION CYST OF DORSUM OF LEFT WRIST: ICD-10-CM

## 2018-05-18 PROCEDURE — 20605 DRAIN/INJ JOINT/BURSA W/O US: CPT | Performed by: FAMILY MEDICINE

## 2018-05-18 ASSESSMENT — PAIN SCALES - GENERAL: PAINLEVEL: NO PAIN

## 2018-05-18 NOTE — PROGRESS NOTES
SUBJECTIVE:        Chief Complaint   Patient presents with   • Cyst     drainage; left wrist ganglion cyst       HPI:     Jovita Rosa is a 42 y.o. female here for wrist cyst of left side.   Size of the cyst has gradually increased. Can cause some discomfort and irritation sometimes. No erythema.    ROS:  Denies any recent fevers or chills. No lower extremity edema.    Current Outpatient Prescriptions on File Prior to Visit   Medication Sig Dispense Refill   • Cholecalciferol (VITAMIN D) 2000 UNIT Tab Take  by mouth every day.     • levothyroxine (SYNTHROID) 125 MCG Tab Take 1 Tab by mouth See Admin Instructions. Pt takes on Mon-Fri 90 Tab 3   • celecoxib (CELEBREX) 200 MG Cap Take 200 mg by mouth every day.     • acetaminophen (TYLENOL) 500 MG Tab Take 1,000 mg by mouth every 6 hours as needed for Moderate Pain.       No current facility-administered medications on file prior to visit.        No Known Allergies    Patient Active Problem List    Diagnosis Date Noted   • Breast mass, right 03/23/2018   • Breast pain, right 03/16/2018   • Breast cyst, right 03/16/2018   • Abscess of right breast 03/16/2018   • Episodic low back pain 01/18/2018   • Leukocytosis 01/18/2018   • Seasonal allergic rhinitis 01/18/2018   • Hypothyroidism 01/18/2018   • Family history of ovarian cancer 01/18/2018   • Bulging of thoracic intervertebral disc 01/18/2018   • DDD (degenerative disc disease), lumbar 01/18/2018   • Weight gain 02/03/2017   • IUD (intrauterine device) in place 02/03/2017   • Psoriasis 02/03/2017       Past Medical History:   Diagnosis Date   • Allergy     Seasonal   • Bulging of thoracic intervertebral disc    • Hypothyroidism          OBJECTIVE:   /64   Pulse 72   Temp 37.2 °C (99 °F)   Resp 12   SpO2 97%   General: Well-developed well-nourished female, no acute distress  Extremities: no cyanosis, clubbing, edema. Left dorsum of mid wrist region with elevated soft tissue mass slightly greater than 1 cm,  nontender, slightly fluctuant.  No erythema or warmth. Nontender.   Skin: Warm and dry  Psych: appropriate mood and affect    PROCEDURE NOTE: After risk and benefits discussed patient was consented. Area was cleansed with iodine and alcohol. Left wrist dorsum was injected with 0.8 mL of lidocaine 1% subcutaneously. Greatest area of central fluctuance was then aspirated with 18 gauge needle. ~0.9-1 ml of viscous gel like blood tinged otherwise clear fluid was aspirated. Small amount of similar material was further expressed. Aspiration of small amount of residual material was reattempted without further removal of substance. Patient tolerated procedure well. Area was washed with saline, antibiotic ointment applied and pressure dressing placed and wrapped. Aftercare instructions given.       ASSESSMENT/PLAN:    42 y.o.female    1. Ganglion cyst of dorsum of left wrist- aspiration of cyst completed today. Residual substance remaining. Tolerated well.   -Discussed aftercare instructions, expectations, and prognosis. Discussed possible recurrence of cyst, need for repeat aspiration or surgical removal in future. Recommend modify activities.   -Monitor, follow up as needed.   Consent for Surgery / Procedure       Return if symptoms worsen or fail to improve.    This medical record contains text that has been entered with the assistance of computer voice recognition and dictation software.  Therefore, it may contain unintended errors in text, spelling, punctuation, or grammar.

## 2018-06-04 ENCOUNTER — APPOINTMENT (RX ONLY)
Dept: URBAN - METROPOLITAN AREA CLINIC 20 | Facility: CLINIC | Age: 43
Setting detail: DERMATOLOGY
End: 2018-06-04

## 2018-06-04 DIAGNOSIS — Z12.83 ENCOUNTER FOR SCREENING FOR MALIGNANT NEOPLASM OF SKIN: ICD-10-CM

## 2018-06-04 DIAGNOSIS — L21.8 OTHER SEBORRHEIC DERMATITIS: ICD-10-CM

## 2018-06-04 DIAGNOSIS — D18.0 HEMANGIOMA: ICD-10-CM

## 2018-06-04 DIAGNOSIS — Z80.8 FAMILY HISTORY OF MALIGNANT NEOPLASM OF OTHER ORGANS OR SYSTEMS: ICD-10-CM

## 2018-06-04 DIAGNOSIS — L40.0 PSORIASIS VULGARIS: ICD-10-CM

## 2018-06-04 DIAGNOSIS — L73.8 OTHER SPECIFIED FOLLICULAR DISORDERS: ICD-10-CM

## 2018-06-04 DIAGNOSIS — D22 MELANOCYTIC NEVI: ICD-10-CM

## 2018-06-04 DIAGNOSIS — D485 NEOPLASM OF UNCERTAIN BEHAVIOR OF SKIN: ICD-10-CM

## 2018-06-04 DIAGNOSIS — L81.4 OTHER MELANIN HYPERPIGMENTATION: ICD-10-CM

## 2018-06-04 PROBLEM — D22.39 MELANOCYTIC NEVI OF OTHER PARTS OF FACE: Status: ACTIVE | Noted: 2018-06-04

## 2018-06-04 PROBLEM — D48.5 NEOPLASM OF UNCERTAIN BEHAVIOR OF SKIN: Status: ACTIVE | Noted: 2018-06-04

## 2018-06-04 PROBLEM — D22.62 MELANOCYTIC NEVI OF LEFT UPPER LIMB, INCLUDING SHOULDER: Status: ACTIVE | Noted: 2018-06-04

## 2018-06-04 PROBLEM — D22.61 MELANOCYTIC NEVI OF RIGHT UPPER LIMB, INCLUDING SHOULDER: Status: ACTIVE | Noted: 2018-06-04

## 2018-06-04 PROBLEM — D18.01 HEMANGIOMA OF SKIN AND SUBCUTANEOUS TISSUE: Status: ACTIVE | Noted: 2018-06-04

## 2018-06-04 PROBLEM — D23.72 OTHER BENIGN NEOPLASM OF SKIN OF LEFT LOWER LIMB, INCLUDING HIP: Status: ACTIVE | Noted: 2018-06-04

## 2018-06-04 PROCEDURE — ? BIOPSY BY SHAVE METHOD

## 2018-06-04 PROCEDURE — 99214 OFFICE O/P EST MOD 30 MIN: CPT | Mod: 25

## 2018-06-04 PROCEDURE — 11100: CPT

## 2018-06-04 PROCEDURE — 11101: CPT

## 2018-06-04 PROCEDURE — ? COUNSELING

## 2018-06-04 ASSESSMENT — LOCATION DETAILED DESCRIPTION DERM
LOCATION DETAILED: LEFT ELBOW
LOCATION DETAILED: LEFT NASAL ALA
LOCATION DETAILED: LEFT MEDIAL SUPERIOR CHEST
LOCATION DETAILED: RIGHT INFERIOR MEDIAL MALAR CHEEK
LOCATION DETAILED: SUPERIOR THORACIC SPINE
LOCATION DETAILED: RIGHT PROXIMAL POSTERIOR UPPER ARM
LOCATION DETAILED: LEFT CENTRAL ZYGOMA
LOCATION DETAILED: LEFT SUPERIOR UPPER BACK
LOCATION DETAILED: RIGHT ELBOW
LOCATION DETAILED: LEFT PROXIMAL POSTERIOR UPPER ARM
LOCATION DETAILED: RIGHT CENTRAL MALAR CHEEK
LOCATION DETAILED: LEFT CENTRAL MALAR CHEEK

## 2018-06-04 ASSESSMENT — LOCATION SIMPLE DESCRIPTION DERM
LOCATION SIMPLE: UPPER BACK
LOCATION SIMPLE: LEFT UPPER BACK
LOCATION SIMPLE: RIGHT ELBOW
LOCATION SIMPLE: RIGHT POSTERIOR UPPER ARM
LOCATION SIMPLE: LEFT ELBOW
LOCATION SIMPLE: CHEST
LOCATION SIMPLE: LEFT POSTERIOR UPPER ARM
LOCATION SIMPLE: RIGHT CHEEK
LOCATION SIMPLE: LEFT CHEEK
LOCATION SIMPLE: LEFT ZYGOMA
LOCATION SIMPLE: LEFT NOSE

## 2018-06-04 ASSESSMENT — LOCATION ZONE DERM
LOCATION ZONE: FACE
LOCATION ZONE: TRUNK
LOCATION ZONE: NOSE
LOCATION ZONE: ARM

## 2018-06-04 NOTE — PROCEDURE: COUNSELING
Patient Specific Counseling (Will Not Stick From Patient To Patient): Minimal and not active today.  Pt has a topical steroid at home that she uses for flare-ups which works so she will call to let me know the name  so I can refill it for her.  Denies joint pain.

## 2018-06-04 NOTE — PROCEDURE: BIOPSY BY SHAVE METHOD
Biopsy Type: H and E
Lab: [id number]
Dressing: Band-Aid
Render Post-Care Instructions In Note?: yes
Size Of Lesion In Cm: 0.2
Anesthesia Type: 1% lidocaine with 1:200,000 epinephrine and a 1:10 solution of 8.4% sodium bicarbonate and 408mcg clindamycin/ml
Destruction After The Procedure: No
Type Of Destruction Used: Curettage
Hemostasis: Drysol and Electrocautery
Cryotherapy Text: The wound bed was treated with cryotherapy after the biopsy was performed.
Electrodesiccation And Curettage Text: The wound bed was treated with electrodesiccation and curettage after the biopsy was performed.
Wound Care: Aquaphor
Notification Instructions: Patient will be notified of biopsy results; however, patient is instructed to call the office if not contacted within 2 weeks.
Curettage Text: The wound bed was treated with curettage after the biopsy was performed.
Lab Facility: 
Biopsy Method: Personna blade
Silver Nitrate Text: The wound bed was treated with silver nitrate after the biopsy was performed.
Detail Level: Detailed
Anesthesia Volume In Cc: 0.5
Electrodesiccation Text: The wound bed was treated with electrodesiccation after the biopsy was performed.
Billing Type: Third-Party Bill
Consent: Written and verbal consent were obtained and risks were reviewed including but not limited to scarring, infection, bleeding, scabbing, incomplete removal, nerve damage and allergy to anesthesia.
Post-Care Instructions: Keep the biopsy site dry overnight, then keep the site clean by washing with soap and water twice daily then covering with Vaseline/Aquaphor and a Band-Aid until healed.
Additional Anesthesia Volume In Cc (Will Not Render If 0): 0
Size Of Lesion In Cm: 0.3

## 2018-06-04 NOTE — PROCEDURE: COUNSELING
Patient Specific Counseling (Will Not Stick From Patient To Patient): Begin with OTC dandruff shampoo to the area daily x 5 mins then rinse.

## 2018-06-14 ENCOUNTER — APPOINTMENT (OUTPATIENT)
Dept: OTHER | Facility: IMAGING CENTER | Age: 43
End: 2018-06-14

## 2018-08-20 ENCOUNTER — HOSPITAL ENCOUNTER (OUTPATIENT)
Dept: LAB | Facility: MEDICAL CENTER | Age: 43
End: 2018-08-20
Attending: INTERNAL MEDICINE
Payer: OTHER GOVERNMENT

## 2018-08-20 DIAGNOSIS — E06.3 HYPOTHYROIDISM DUE TO HASHIMOTO'S THYROIDITIS: ICD-10-CM

## 2018-08-20 DIAGNOSIS — E03.8 HYPOTHYROIDISM DUE TO HASHIMOTO'S THYROIDITIS: ICD-10-CM

## 2018-08-20 LAB
T3 SERPL-MCNC: 83.9 NG/DL (ref 60–181)
T3FREE SERPL-MCNC: 3.34 PG/ML (ref 2.4–4.2)
T4 FREE SERPL-MCNC: 1.11 NG/DL (ref 0.53–1.43)
TSH SERPL DL<=0.005 MIU/L-ACNC: 0.92 UIU/ML (ref 0.38–5.33)

## 2018-08-20 PROCEDURE — 84480 ASSAY TRIIODOTHYRONINE (T3): CPT

## 2018-08-20 PROCEDURE — 84443 ASSAY THYROID STIM HORMONE: CPT

## 2018-08-20 PROCEDURE — 84481 FREE ASSAY (FT-3): CPT

## 2018-08-20 PROCEDURE — 84439 ASSAY OF FREE THYROXINE: CPT

## 2018-08-20 PROCEDURE — 36415 COLL VENOUS BLD VENIPUNCTURE: CPT

## 2018-08-21 ENCOUNTER — OFFICE VISIT (OUTPATIENT)
Dept: ENDOCRINOLOGY | Facility: MEDICAL CENTER | Age: 43
End: 2018-08-21
Payer: OTHER GOVERNMENT

## 2018-08-21 VITALS
DIASTOLIC BLOOD PRESSURE: 60 MMHG | OXYGEN SATURATION: 97 % | SYSTOLIC BLOOD PRESSURE: 100 MMHG | WEIGHT: 175 LBS | BODY MASS INDEX: 28.12 KG/M2 | HEART RATE: 98 BPM | HEIGHT: 66 IN

## 2018-08-21 DIAGNOSIS — E06.3 HYPOTHYROIDISM DUE TO HASHIMOTO'S THYROIDITIS: ICD-10-CM

## 2018-08-21 DIAGNOSIS — E55.9 VITAMIN D DEFICIENCY: ICD-10-CM

## 2018-08-21 DIAGNOSIS — E03.8 HYPOTHYROIDISM DUE TO HASHIMOTO'S THYROIDITIS: ICD-10-CM

## 2018-08-21 PROCEDURE — 99214 OFFICE O/P EST MOD 30 MIN: CPT | Performed by: INTERNAL MEDICINE

## 2018-08-21 NOTE — PROGRESS NOTES
"Endocrinology Clinic Progress Note  PCP: Mago Mcintyre M.D.    HPI:  Jovita Rosa is a 43 y.o. old patient who comes in today for review of endocrine problems.  Hypothyroid:  Controlled on Levothyroxine 125 mcg daily.  8/20/18 labs show TSH .920, FT4  1.11, T3  83.9, and FT3  3.34.  Having some heart palpitations and not always able to sleep well.  Vitamin D Deficiency:  Vitamin D level up from 15 to 68 on current supplementation.  ROS:  Constitutional: No unintentional weight loss  Endo: Denies excessive thirst or frequent urination  All other systems were reviewed and were negative.    Past Medical History:  Patient Active Problem List    Diagnosis Date Noted   • Breast mass, right 03/23/2018   • Breast pain, right 03/16/2018   • Breast cyst, right 03/16/2018   • Abscess of right breast 03/16/2018   • Episodic low back pain 01/18/2018   • Leukocytosis 01/18/2018   • Seasonal allergic rhinitis 01/18/2018   • Hypothyroidism 01/18/2018   • Family history of ovarian cancer 01/18/2018   • Bulging of thoracic intervertebral disc 01/18/2018   • DDD (degenerative disc disease), lumbar 01/18/2018   • Weight gain 02/03/2017   • IUD (intrauterine device) in place 02/03/2017   • Psoriasis 02/03/2017       Medications:    Current Outpatient Prescriptions:   •  Cholecalciferol (VITAMIN D) 2000 UNIT Tab, Take  by mouth every day., Disp: , Rfl:   •  levothyroxine (SYNTHROID) 125 MCG Tab, Take 1 Tab by mouth See Admin Instructions. Pt takes on Mon-Fri (Patient taking differently: Take 125 mcg by mouth every day.), Disp: 90 Tab, Rfl: 3  •  celecoxib (CELEBREX) 200 MG Cap, Take 200 mg by mouth every day., Disp: , Rfl:     Labs: Reviewed    Physical Examination:  Vital signs: /60   Pulse 98   Ht 1.676 m (5' 6\")   Wt 79.4 kg (175 lb)   SpO2 97%   BMI 28.25 kg/m²  Body mass index is 28.25 kg/m². Patient's body mass index is 28.25 kg/m². Exercise and nutrition counseling were performed at this visit.    General: No " apparent distress, cooperative  Eyes: No scleral icterus, no discharge, normal eyelids  Neck: No abnormal masses on inspection, normal thyroid exam  Resp: Normal effort, clear to auscultation bilaterally  CVS: Regular rate and rhythm, S1 S2 normal, no murmur  Extremities: No lower extremity edema  Abdomen: abdominal obesity present  Musculoskeletal: Normal digits and nails  Skin: No rash on visible skin  Psych: Alert and oriented, normal mood and affect, intact memory and able to make informed decisions.    Assessment and Plan:    1. Hypothyroidism due to Hashimoto's thyroiditis  Continue current dose of levothyroxine.  It appears that her occasional episodes of palpitation and sleeplessness does not seem to be related to her thyroid problem    2. Vitamin D deficiency  Any vitamin D supplementation    Return in about 6 months (around 2/21/2019).    Thank you for allowing me to participate in the care of this patient.    Matt Zuniga M.D.    CC:   Mago Mcintyre M.D.    This note was created using voice recognition software (Dragon). The accuracy of the dictation is limited by the abilities of the software. I have reviewed the note prior to signing, however some errors in grammar and context are still possible. If you have any questions related to this note please do not hesitate to contact our office.

## 2018-08-22 ENCOUNTER — APPOINTMENT (OUTPATIENT)
Dept: OTHER | Facility: IMAGING CENTER | Age: 43
End: 2018-08-22

## 2018-09-12 RX ORDER — LEVOTHYROXINE SODIUM 0.12 MG/1
125 TABLET ORAL
Qty: 90 TAB | Refills: 3 | Status: SHIPPED | OUTPATIENT
Start: 2018-09-12 | End: 2018-10-17 | Stop reason: SDUPTHER

## 2018-09-12 NOTE — TELEPHONE ENCOUNTER
Was the patient seen in the last year in this department? Yes    Does patient have an active prescription for medications requested? Yes    Received Request Via: Patient

## 2018-09-25 ENCOUNTER — APPOINTMENT (OUTPATIENT)
Dept: OTHER | Facility: IMAGING CENTER | Age: 43
End: 2018-09-25

## 2018-10-17 ENCOUNTER — TELEPHONE (OUTPATIENT)
Dept: PHYSICAL MEDICINE AND REHAB | Facility: MEDICAL CENTER | Age: 43
End: 2018-10-17

## 2018-10-17 DIAGNOSIS — E03.9 HYPOTHYROIDISM, UNSPECIFIED TYPE: ICD-10-CM

## 2018-10-17 RX ORDER — LEVOTHYROXINE SODIUM 0.12 MG/1
125 TABLET ORAL
Qty: 90 TAB | Refills: 3 | Status: SHIPPED | OUTPATIENT
Start: 2018-10-17 | End: 2018-10-17 | Stop reason: SDUPTHER

## 2018-10-17 RX ORDER — LEVOTHYROXINE SODIUM 0.12 MG/1
125 TABLET ORAL
Qty: 90 TAB | Refills: 3 | Status: SHIPPED | OUTPATIENT
Start: 2018-10-17 | End: 2019-07-08 | Stop reason: SDUPTHER

## 2018-10-17 NOTE — TELEPHONE ENCOUNTER
----- Message from Kary Gillette sent at 10/17/2018  6:40 AM PDT -----  Regarding: Medication  Contact: [phone]  Pt left a message asking if we could resubmit her prescription to [facility] for her Synthroid for 90 pill for 90 days, instead of 65 pills/90days. Fax number- [phone]. Thank you!

## 2018-10-17 NOTE — TELEPHONE ENCOUNTER
Spoke to Pt and notified that Rx for Levothyroxine 125 mcg for the quantity of 90 was sent last 9/12/18 and there is a confirmation that they receive it.    Thank you  Tara

## 2018-11-15 ENCOUNTER — APPOINTMENT (RX ONLY)
Dept: URBAN - METROPOLITAN AREA CLINIC 20 | Facility: CLINIC | Age: 43
Setting detail: DERMATOLOGY
End: 2018-11-15

## 2018-11-15 DIAGNOSIS — Z41.9 ENCOUNTER FOR PROCEDURE FOR PURPOSES OTHER THAN REMEDYING HEALTH STATE, UNSPECIFIED: ICD-10-CM

## 2018-11-15 PROCEDURE — ? SCITON BBL

## 2018-11-15 ASSESSMENT — LOCATION SIMPLE DESCRIPTION DERM: LOCATION SIMPLE: LEFT CHEEK

## 2018-11-15 ASSESSMENT — LOCATION DETAILED DESCRIPTION DERM: LOCATION DETAILED: LEFT INFERIOR CENTRAL MALAR CHEEK

## 2018-11-15 ASSESSMENT — LOCATION ZONE DERM: LOCATION ZONE: FACE

## 2018-11-15 NOTE — PROCEDURE: SCITON BBL
Consent: Written consent obtained, risks reviewed including but not limited to crusting, scabbing, blistering, scarring, darker or lighter pigmentary change, bruising, and/or incomplete response.
Cooling (In C): 20
Pulse Duration Units: milliseconds
Post-Care Instructions: I reviewed with the patient in detail post-care instructions. Patient should stay away from the sun and wear sun protection until treated areas are fully healed.
Passes: 1
Price (Use Numbers Only, No Special Characters Or $): 550.00 discount from ad in edible
Spot Size: Finesse Adapter Size: 15 x 15 mm square
Anesthesia Volume In Cc: 0
Fluence (J/Cm2): 15
Preprocedure Text: The treatment areas were thoroughly cleaned. Clear ultrasound gel was applied to the treatment area. The area was treated with no immediate stacking of pulses.
Cooling (In C): 25
Spot Size: Finesse Adapter Size: 11 mm square
Post Procedure Text: The patient tolerated the procedure well. Post care was reviewed with the patient.
Passes: 2
Cooling ?: Yes
Repetition Rate (Hz): 10
Fluence (J/Cm2): 12
Fluence (J/Cm2): 14
Spot Size: Finesse Adapter Size: 15 x 45 mm (No Finesse Adapter)
Detail Level: Zone

## 2018-11-19 ENCOUNTER — APPOINTMENT (OUTPATIENT)
Dept: OTHER | Facility: IMAGING CENTER | Age: 43
End: 2018-11-19

## 2018-12-19 ENCOUNTER — APPOINTMENT (OUTPATIENT)
Dept: OTHER | Facility: IMAGING CENTER | Age: 43
End: 2018-12-19

## 2019-01-02 ENCOUNTER — OFFICE VISIT (OUTPATIENT)
Dept: OTHER | Facility: IMAGING CENTER | Age: 44
End: 2019-01-02
Payer: OTHER GOVERNMENT

## 2019-01-02 ENCOUNTER — TELEPHONE (OUTPATIENT)
Dept: OTHER | Facility: IMAGING CENTER | Age: 44
End: 2019-01-02

## 2019-01-02 VITALS
TEMPERATURE: 99 F | OXYGEN SATURATION: 96 % | RESPIRATION RATE: 14 BRPM | BODY MASS INDEX: 29.3 KG/M2 | SYSTOLIC BLOOD PRESSURE: 100 MMHG | DIASTOLIC BLOOD PRESSURE: 70 MMHG | WEIGHT: 182.32 LBS | HEART RATE: 78 BPM | HEIGHT: 66 IN

## 2019-01-02 DIAGNOSIS — H10.33 ACUTE CONJUNCTIVITIS OF BOTH EYES, UNSPECIFIED ACUTE CONJUNCTIVITIS TYPE: ICD-10-CM

## 2019-01-02 PROCEDURE — 99213 OFFICE O/P EST LOW 20 MIN: CPT | Performed by: FAMILY MEDICINE

## 2019-01-02 RX ORDER — POLYMYXIN B SULFATE AND TRIMETHOPRIM 1; 10000 MG/ML; [USP'U]/ML
1 SOLUTION OPHTHALMIC EVERY 4 HOURS
Qty: 10 ML | Refills: 0 | Status: SHIPPED | OUTPATIENT
Start: 2019-01-02 | End: 2019-04-12 | Stop reason: SDUPTHER

## 2019-01-02 NOTE — TELEPHONE ENCOUNTER
"Patient came into office this morning wondering if she could be prescribed some eye drops. Patient left office asking for a call back. Called patient regarding what kind of eye drops she needs. Patient said for about the last week and a half she has been having some \"eye goopyness\" that started in her right eye first and has now started in the left eye. She does not believe it is pink eye because she is not waking up with her eye lids crusted over. She does believe it could be some conjunctivitis that she has suffered from before. Let patient know I would give this information to  and give her a call back when I have more information regarding what Dr. Mcintyre would like her to do.   "

## 2019-01-02 NOTE — PROGRESS NOTES
SUBJECTIVE:        Chief Complaint   Patient presents with   • Eye Drainage     last week and a half, red in the mornings, started in the left eye       HPI:     Jovita Rosa is a 43 y.o. female here for symptoms of eye drainage and redness. Left side started about 10 days ago and now has noticed on her right side past few days. Not itchy. Some crusting in the morning noted. Son's school had pink eye going around, but her son did not have symptoms.   Denies any fevers/chills, congestion or sore throat. Vision stable. No pain.   She is a  and travels to [place], for a few months noticed that once a month she had some eye irritation/redness. Eventually cleaned out the car she had there and it seemed to help.   When she has had previous symptoms she used an eye drop which helped, but does not recall name of medication.     ROS:  Denies any recent fevers or chills. No nausea or vomiting. No diarrhea. No chest pains or shortness of breath. No lower extremity edema.    Current Outpatient Prescriptions on File Prior to Visit   Medication Sig Dispense Refill   • levothyroxine (SYNTHROID) 125 MCG Tab Take 1 Tab by mouth Every morning on an empty stomach. 90 Tab 3   • Cholecalciferol (VITAMIN D) 2000 UNIT Tab Take  by mouth every day.     • celecoxib (CELEBREX) 200 MG Cap Take 200 mg by mouth every day.       No current facility-administered medications on file prior to visit.        No Known Allergies    Patient Active Problem List    Diagnosis Date Noted   • Breast mass, right 03/23/2018   • Breast pain, right 03/16/2018   • Breast cyst, right 03/16/2018   • Abscess of right breast 03/16/2018   • Episodic low back pain 01/18/2018   • Leukocytosis 01/18/2018   • Seasonal allergic rhinitis 01/18/2018   • Hypothyroidism 01/18/2018   • Family history of ovarian cancer 01/18/2018   • Bulging of thoracic intervertebral disc 01/18/2018   • DDD (degenerative disc disease), lumbar 01/18/2018   • Weight gain 02/03/2017   •  "IUD (intrauterine device) in place 02/03/2017   • Psoriasis 02/03/2017       Past Medical History:   Diagnosis Date   • Allergy     Seasonal   • Bulging of thoracic intervertebral disc    • Hypothyroidism        OBJECTIVE:   /70 (BP Location: Left arm, Patient Position: Sitting, BP Cuff Size: Adult)   Pulse 78   Temp 37.2 °C (99 °F) (Temporal)   Resp 14   Ht 1.676 m (5' 6\")   Wt 82.7 kg (182 lb 5.1 oz)   SpO2 96%   BMI 29.43 kg/m²   General: Well-developed well-nourished female, no acute distress  HEENT: eyes- conjunctiva of right side with erythema, only residual erythema of left noted, minimal drainage ntoed.   Neck: supple, no lymphadenopathy- cervical or supraclavicular, no thyromegaly  Cardiovascular: regular rate and rhythm, no murmurs, gallops, rubs  Lungs: clear to auscultation bilaterally, no wheezes, crackles, or rhonchi  Extremities: no cyanosis, clubbing, edema  Skin: Warm and dry  Psych: appropriate mood and affect      ASSESSMENT/PLAN:    43 y.o.female    1. Acute conjunctivitis of both eyes, unspecified acute conjunctivitis type- L then R, currently R> L. Some hx of recurrent issues. Recent symptoms initially likely viral component. She may also have hx of  environmental/allergic etiology in past.   -Discussed use of medication if any worsening or no further improvement. Anticipate resolution of symptoms in 7-10 days. Discussed good routine hygiene to prevent further symptoms. Avoid touching eyes, wash hands and pillow case routinely.   Reviewed precautions and potential side effects of medication therapy.  polymixin-trimethoprim (POLYTRIM) 92770-3.1 UNIT/ML-% Solution     Return in about 1 week (around 1/9/2019), or if symptoms worsen or fail to improve.    This medical record contains text that has been entered with the assistance of computer voice recognition and dictation software.  Therefore, it may contain unintended errors in text, spelling, punctuation, or " grammar.

## 2019-01-02 NOTE — TELEPHONE ENCOUNTER
Patient calling back to check in. Let her know that it is hard to tell what Dr. Mcintyre should give her without looking at it. Asked patient if she is willing to come in for an appointment today. Scheduled patient for 2pm today with Dr. Mcintyre.

## 2019-01-24 ENCOUNTER — APPOINTMENT (RX ONLY)
Dept: URBAN - METROPOLITAN AREA CLINIC 36 | Facility: CLINIC | Age: 44
Setting detail: DERMATOLOGY
End: 2019-01-24

## 2019-01-24 DIAGNOSIS — Z41.9 ENCOUNTER FOR PROCEDURE FOR PURPOSES OTHER THAN REMEDYING HEALTH STATE, UNSPECIFIED: ICD-10-CM

## 2019-01-24 PROCEDURE — ? ADDITIONAL NOTES

## 2019-01-24 PROCEDURE — ? SCITON BBL

## 2019-01-24 ASSESSMENT — LOCATION DETAILED DESCRIPTION DERM: LOCATION DETAILED: LEFT INFERIOR CENTRAL MALAR CHEEK

## 2019-01-24 ASSESSMENT — LOCATION ZONE DERM: LOCATION ZONE: FACE

## 2019-01-24 ASSESSMENT — LOCATION SIMPLE DESCRIPTION DERM: LOCATION SIMPLE: LEFT CHEEK

## 2019-01-24 NOTE — PROCEDURE: SCITON BBL
Fluence (J/Cm2): 10
Detail Level: Zone
Pulse Duration: 15
Repetition Rate (Hz): 1
Spot Size: Finesse Adapter Size: 15 x 45 mm (No Finesse Adapter)
Cooling ?: Yes
Post-Care Instructions: I reviewed with the patient in detail post-care instructions. Patient should stay away from the sun and wear sun protection until treated areas are fully healed.
Fluence (J/Cm2): 12
Pulse Duration: 20
Pulse Duration Units: milliseconds
Cooling (In C): 22
Fluence (J/Cm2): 13
Spot Size: Finesse Adapter Size: 15 x 15 mm square
Consent: Written consent obtained, risks reviewed including but not limited to crusting, scabbing, blistering, scarring, darker or lighter pigmentary change, bruising, and/or incomplete response.
Preprocedure Text: The treatment areas were thoroughly cleaned. Clear ultrasound gel was applied to the treatment area. The area was treated with no immediate stacking of pulses.
Price (Use Numbers Only, No Special Characters Or $): 650.00
Post Procedure Text: The patient tolerated the procedure well. Post care was reviewed with the patient.
Anesthesia Volume In Cc: 0
Indication Override (Will Not Show Above If Text Entered): vascular

## 2019-01-24 NOTE — PROCEDURE: ADDITIONAL NOTES
Detail Level: Simple
Additional Notes: Dr Dos Santos came in and examined patients skin. She is dry and irritated. We reviewed skin care routine with patient and Dr. Dos Santos recommended the Skin [place] Gentle Cleanser and Epionce Medical Barrier to help calm the skin down and hydrate it. Patient previous tried Elta Foaming Cleanser and did not like the smell.

## 2019-02-26 ENCOUNTER — APPOINTMENT (RX ONLY)
Dept: URBAN - METROPOLITAN AREA CLINIC 20 | Facility: CLINIC | Age: 44
Setting detail: DERMATOLOGY
End: 2019-02-26

## 2019-02-26 DIAGNOSIS — L90.5 SCAR CONDITIONS AND FIBROSIS OF SKIN: ICD-10-CM

## 2019-02-26 DIAGNOSIS — L71.8 OTHER ROSACEA: ICD-10-CM

## 2019-02-26 PROCEDURE — 99213 OFFICE O/P EST LOW 20 MIN: CPT | Mod: 25

## 2019-02-26 PROCEDURE — ? ADDITIONAL NOTES

## 2019-02-26 PROCEDURE — ? INTRALESIONAL KENALOG

## 2019-02-26 PROCEDURE — 11900 INJECT SKIN LESIONS </W 7: CPT

## 2019-02-26 PROCEDURE — ? PRESCRIPTION

## 2019-02-26 PROCEDURE — ? COUNSELING

## 2019-02-26 RX ORDER — AZELAIC ACID 0.15 G/G
AEROSOL, FOAM TOPICAL
Qty: 1 | Refills: 11 | Status: ERX | COMMUNITY
Start: 2019-02-26 | End: 2020-12-02

## 2019-02-26 RX ADMIN — AZELAIC ACID 1: 0.15 AEROSOL, FOAM TOPICAL at 00:00

## 2019-02-26 ASSESSMENT — LOCATION DETAILED DESCRIPTION DERM
LOCATION DETAILED: MIDDLE STERNUM
LOCATION DETAILED: LEFT CENTRAL MALAR CHEEK
LOCATION DETAILED: RIGHT CENTRAL MALAR CHEEK

## 2019-02-26 ASSESSMENT — LOCATION ZONE DERM
LOCATION ZONE: TRUNK
LOCATION ZONE: FACE

## 2019-02-26 ASSESSMENT — LOCATION SIMPLE DESCRIPTION DERM
LOCATION SIMPLE: LEFT CHEEK
LOCATION SIMPLE: CHEST
LOCATION SIMPLE: RIGHT CHEEK

## 2019-02-26 NOTE — PROCEDURE: INTRALESIONAL KENALOG
Treatment Number (Optional): 1
Concentration Of Kenalog Solution Injected (Mg/Ml): 20.0
Administered By (Optional): Gabby CONCEPCION
Include Z78.9 (Other Specified Conditions Influencing Health Status) As An Associated Diagnosis?: Yes
Medical Necessity Clause: This procedure was medically necessary because the lesions that were treated were:
Ndc# For Kenalog Only: [id number]
X Size Of Lesion In Cm (Optional): 0
Lot # For Kenalog (Optional): [id number]
Detail Level: Detailed
Consent: The risks of atrophy, bleeding, scarring and infection were reviewed with the patient. Re-treatment may be necessary.
Kenalog Preparation: Kenalog
Expiration Date For Kenalog (Optional): mar2020
Total Volume (Ccs): .1

## 2019-02-26 NOTE — PROCEDURE: ADDITIONAL NOTES
Detail Level: Detailed
Additional Notes: Pt is a  for [employer] and will check to see if doxycyline is an acceptable medication and will contact us if she decides to start low dose doxycycline.

## 2019-04-08 ENCOUNTER — HOSPITAL ENCOUNTER (OUTPATIENT)
Dept: RADIOLOGY | Facility: MEDICAL CENTER | Age: 44
End: 2019-04-08
Attending: FAMILY MEDICINE
Payer: OTHER GOVERNMENT

## 2019-04-08 DIAGNOSIS — Z12.31 VISIT FOR SCREENING MAMMOGRAM: ICD-10-CM

## 2019-04-08 PROCEDURE — 77067 SCR MAMMO BI INCL CAD: CPT

## 2019-04-16 ENCOUNTER — HOSPITAL ENCOUNTER (OUTPATIENT)
Dept: LAB | Facility: MEDICAL CENTER | Age: 44
End: 2019-04-16
Attending: INTERNAL MEDICINE
Payer: OTHER GOVERNMENT

## 2019-04-16 ENCOUNTER — HOSPITAL ENCOUNTER (OUTPATIENT)
Dept: LAB | Facility: MEDICAL CENTER | Age: 44
End: 2019-04-16
Attending: FAMILY MEDICINE
Payer: OTHER GOVERNMENT

## 2019-04-16 DIAGNOSIS — E66.3 OVERWEIGHT (BMI 25.0-29.9): ICD-10-CM

## 2019-04-16 DIAGNOSIS — E55.9 VITAMIN D DEFICIENCY: ICD-10-CM

## 2019-04-16 DIAGNOSIS — N92.6 IRREGULAR PERIODS: ICD-10-CM

## 2019-04-16 DIAGNOSIS — E03.8 HYPOTHYROIDISM DUE TO HASHIMOTO'S THYROIDITIS: ICD-10-CM

## 2019-04-16 DIAGNOSIS — E06.3 HYPOTHYROIDISM DUE TO HASHIMOTO'S THYROIDITIS: ICD-10-CM

## 2019-04-16 LAB
25(OH)D3 SERPL-MCNC: 12 NG/ML (ref 30–100)
FERRITIN SERPL-MCNC: 103.1 NG/ML (ref 10–291)
IRON SATN MFR SERPL: 28 % (ref 15–55)
IRON SERPL-MCNC: 84 UG/DL (ref 40–170)
T3 SERPL-MCNC: 78.9 NG/DL (ref 60–181)
T3FREE SERPL-MCNC: 3.32 PG/ML (ref 2.4–4.2)
T4 FREE SERPL-MCNC: 1.02 NG/DL (ref 0.53–1.43)
TIBC SERPL-MCNC: 302 UG/DL (ref 250–450)
TSH SERPL DL<=0.005 MIU/L-ACNC: 1.41 UIU/ML (ref 0.38–5.33)

## 2019-04-16 PROCEDURE — 84443 ASSAY THYROID STIM HORMONE: CPT

## 2019-04-16 PROCEDURE — 82728 ASSAY OF FERRITIN: CPT

## 2019-04-16 PROCEDURE — 83540 ASSAY OF IRON: CPT

## 2019-04-16 PROCEDURE — 84481 FREE ASSAY (FT-3): CPT

## 2019-04-16 PROCEDURE — 84439 ASSAY OF FREE THYROXINE: CPT

## 2019-04-16 PROCEDURE — 36415 COLL VENOUS BLD VENIPUNCTURE: CPT

## 2019-04-16 PROCEDURE — 83550 IRON BINDING TEST: CPT

## 2019-04-16 PROCEDURE — 84480 ASSAY TRIIODOTHYRONINE (T3): CPT

## 2019-04-16 PROCEDURE — 82306 VITAMIN D 25 HYDROXY: CPT

## 2019-07-08 DIAGNOSIS — E03.9 HYPOTHYROIDISM, UNSPECIFIED TYPE: ICD-10-CM

## 2019-07-08 RX ORDER — LEVOTHYROXINE SODIUM 0.12 MG/1
125 TABLET ORAL
Qty: 30 TAB | Refills: 0 | Status: SHIPPED | OUTPATIENT
Start: 2019-07-08 | End: 2019-10-14 | Stop reason: SDUPTHER

## 2019-07-09 NOTE — TELEPHONE ENCOUNTER
Was the patient seen in the last year in this department? Yes    Does patient have an active prescription for medications requested? Yes    Received Request Via: Patient     (Patient is out of town and forgot her Levothyroxine rx. Please send a short script to [facility] in [facility])    She would like a call once this has been sent in.    **Last office visit 8/18/18. Next scheduled appt 7/24/19**

## 2019-07-17 ENCOUNTER — OFFICE VISIT (OUTPATIENT)
Dept: ENDOCRINOLOGY | Facility: MEDICAL CENTER | Age: 44
End: 2019-07-17
Payer: OTHER GOVERNMENT

## 2019-07-17 VITALS
BODY MASS INDEX: 27.68 KG/M2 | WEIGHT: 172.2 LBS | HEIGHT: 66 IN | SYSTOLIC BLOOD PRESSURE: 104 MMHG | HEART RATE: 96 BPM | OXYGEN SATURATION: 95 % | DIASTOLIC BLOOD PRESSURE: 78 MMHG

## 2019-07-17 DIAGNOSIS — E03.9 HYPOTHYROIDISM, UNSPECIFIED TYPE: ICD-10-CM

## 2019-07-17 DIAGNOSIS — E55.9 VITAMIN D DEFICIENCY: ICD-10-CM

## 2019-07-17 DIAGNOSIS — E53.8 VITAMIN B 12 DEFICIENCY: ICD-10-CM

## 2019-07-17 PROCEDURE — 99214 OFFICE O/P EST MOD 30 MIN: CPT | Performed by: INTERNAL MEDICINE

## 2019-07-17 NOTE — PROGRESS NOTES
Endocrinology Clinic Progress Note  PCP: Mago Mcintyre M.D.    HPI:  Jovita Rosa is a 44 y.o. old patient who comes in today for review of her hypothyroid and vitamin d deficiency.   She is currently on Levothyroxine 125 mcg per day.  Last thyroid tests on 4/16/19 show TSH of 1.410, T4 1.02, T3 78.9 and Free T 3 at 3.32    She is currently on vitamin d 2000 iu per day, last vitamin d level was 12 on 4/16/19  ROS:  Constitutional: No weight loss  Cardiac: No palpitations or racing heart  Resp: No shortness of breath  Neuro: No numbness or tinging in feet  Endo: No heat or cold intolerance, no polyuria or polydipsia  All other systems were reviewed and were negative.    Past Medical History:  Patient Active Problem List    Diagnosis Date Noted   • Breast mass, right 03/23/2018   • Breast pain, right 03/16/2018   • Breast cyst, right 03/16/2018   • Abscess of right breast 03/16/2018   • Episodic low back pain 01/18/2018   • Leukocytosis 01/18/2018   • Seasonal allergic rhinitis 01/18/2018   • Hypothyroidism 01/18/2018   • Family history of ovarian cancer 01/18/2018   • Bulging of thoracic intervertebral disc 01/18/2018   • DDD (degenerative disc disease), lumbar 01/18/2018   • Weight gain 02/03/2017   • IUD (intrauterine device) in place 02/03/2017   • Psoriasis 02/03/2017       Past Surgical History:  History reviewed. No pertinent surgical history.    Allergies:  Patient has no known allergies.    Social History:  Social History     Social History   • Marital status:      Spouse name: N/A   • Number of children: N/A   • Years of education: N/A     Occupational History   •       Social History Main Topics   • Smoking status: Never Smoker   • Smokeless tobacco: Never Used   • Alcohol use 1.2 oz/week     2 Glasses of wine per week      Comment: occasionally    • Drug use: No   • Sexual activity: Yes     Partners: Male     Birth control/ protection: IUD     Other Topics Concern   • Not on file  "    Social History Narrative    , 2 children.        Family History:  Family History   Problem Relation Age of Onset   • Cancer Mother 65        ovarian   • Heart Disease Mother    • Stroke Mother         a fib   • Hypertension Mother    • Cancer Father         lung, skin- precancerous   • Lung Disease Father    • Hypertension Brother        Medications:    Current Outpatient Prescriptions:   •  levothyroxine (SYNTHROID) 125 MCG Tab, Take 1 Tab by mouth Every morning on an empty stomach., Disp: 30 Tab, Rfl: 0  •  Cholecalciferol (VITAMIN D) 2000 UNIT Tab, Take  by mouth every day., Disp: , Rfl:   •  polymixin-trimethoprim (POLYTRIM) 07643-2.1 UNIT/ML-% Solution, PLACE 1 DROP IN BOTH EYES EVERY 4 HOURS, Disp: 10 mL, Rfl: 0  •  celecoxib (CELEBREX) 200 MG Cap, Take 200 mg by mouth every day., Disp: , Rfl:     Labs: Reviewed    Physical Examination:  Vital signs: /78   Pulse 96   Ht 1.676 m (5' 6\")   Wt 78.1 kg (172 lb 3.2 oz)   SpO2 95%   BMI 27.79 kg/m²  Body mass index is 27.79 kg/m².  General: No apparent distress, cooperative  Eyes: No scleral icterus or discharge  ENMT: Normal on external inspection of nose, lips, normal thyroid exam  Neck: No abnormal masses on inspection  Resp: Normal effort, clear to auscultation bilaterally   CVS: Regular rate and rhythm, S1 S2 normal, no murmur   Extremities: No edema  Abdomen: abdominal obesity present  Neuro: Alert and oriented  Skin: No rash  Psych: Normal mood and affect, intact memory and able to make informed decisions    Assessment and Plan:    1. Hypothyroidism, unspecified type  Continue current dose    2. Vitamin D deficiency  Recommend 8000 to 10,000 units daily vitamin D with food    3. Vit B 12 ( low levels):  Levels were around 300 sometime in 2017.  Recommend 2500 mcg sublingual daily to target B12 level above 500    Return in about 3 months (around 10/17/2019).    Thank you for allowing me to participate in the care of this " patient.    Matt Zuniga M.D.  07/17/19    CC:   Mago Mcintyre M.D.    This note was created using voice recognition software (Dragon). The accuracy of the dictation is limited by the abilities of the software. I have reviewed the note prior to signing, however some errors in grammar and context are still possible. If you have any questions related to this note please do not hesitate to contact our office.   This note was scribed by Cleo Dillard RN, CDE

## 2019-07-24 ENCOUNTER — APPOINTMENT (OUTPATIENT)
Dept: ENDOCRINOLOGY | Facility: MEDICAL CENTER | Age: 44
End: 2019-07-24
Payer: OTHER GOVERNMENT

## 2019-10-14 DIAGNOSIS — E03.9 HYPOTHYROIDISM, UNSPECIFIED TYPE: ICD-10-CM

## 2019-10-14 RX ORDER — LEVOTHYROXINE SODIUM 0.12 MG/1
125 TABLET ORAL
Qty: 30 TAB | Refills: 9 | Status: SHIPPED | OUTPATIENT
Start: 2019-10-14 | End: 2019-10-28 | Stop reason: SDUPTHER

## 2019-10-28 DIAGNOSIS — E03.9 HYPOTHYROIDISM, UNSPECIFIED TYPE: ICD-10-CM

## 2019-10-28 RX ORDER — LEVOTHYROXINE SODIUM 0.12 MG/1
125 TABLET ORAL
Qty: 90 TAB | Refills: 1 | Status: SHIPPED | OUTPATIENT
Start: 2019-10-28 | End: 2020-04-13 | Stop reason: SDUPTHER

## 2019-10-28 RX ORDER — LEVOTHYROXINE SODIUM 0.12 MG/1
125 TABLET ORAL
Qty: 30 TAB | Refills: 9 | Status: SHIPPED | OUTPATIENT
Start: 2019-10-28 | End: 2020-09-02

## 2019-11-12 ENCOUNTER — HOSPITAL ENCOUNTER (OUTPATIENT)
Dept: LAB | Facility: MEDICAL CENTER | Age: 44
End: 2019-11-12
Attending: INTERNAL MEDICINE
Payer: OTHER GOVERNMENT

## 2019-11-12 DIAGNOSIS — E53.8 VITAMIN B 12 DEFICIENCY: ICD-10-CM

## 2019-11-12 DIAGNOSIS — E55.9 VITAMIN D DEFICIENCY: ICD-10-CM

## 2019-11-12 DIAGNOSIS — E03.9 HYPOTHYROIDISM, UNSPECIFIED TYPE: ICD-10-CM

## 2019-11-12 LAB — 25(OH)D3 SERPL-MCNC: 32 NG/ML (ref 30–100)

## 2019-11-12 PROCEDURE — 84439 ASSAY OF FREE THYROXINE: CPT

## 2019-11-12 PROCEDURE — 84481 FREE ASSAY (FT-3): CPT

## 2019-11-12 PROCEDURE — 84443 ASSAY THYROID STIM HORMONE: CPT

## 2019-11-12 PROCEDURE — 84480 ASSAY TRIIODOTHYRONINE (T3): CPT

## 2019-11-12 PROCEDURE — 36415 COLL VENOUS BLD VENIPUNCTURE: CPT

## 2019-11-12 PROCEDURE — 82607 VITAMIN B-12: CPT

## 2019-11-12 PROCEDURE — 82306 VITAMIN D 25 HYDROXY: CPT

## 2019-11-13 ENCOUNTER — OFFICE VISIT (OUTPATIENT)
Dept: ENDOCRINOLOGY | Facility: MEDICAL CENTER | Age: 44
End: 2019-11-13
Payer: OTHER MISCELLANEOUS

## 2019-11-13 VITALS
BODY MASS INDEX: 27.32 KG/M2 | WEIGHT: 170 LBS | DIASTOLIC BLOOD PRESSURE: 62 MMHG | HEART RATE: 83 BPM | HEIGHT: 66 IN | SYSTOLIC BLOOD PRESSURE: 88 MMHG | OXYGEN SATURATION: 94 %

## 2019-11-13 DIAGNOSIS — E55.9 VITAMIN D DEFICIENCY: ICD-10-CM

## 2019-11-13 DIAGNOSIS — E03.9 HYPOTHYROIDISM, UNSPECIFIED TYPE: ICD-10-CM

## 2019-11-13 DIAGNOSIS — E53.8 VITAMIN B 12 DEFICIENCY: ICD-10-CM

## 2019-11-13 LAB
T3 SERPL-MCNC: 92.1 NG/DL (ref 60–181)
T3FREE SERPL-MCNC: 2.93 PG/ML (ref 2.4–4.2)
T4 FREE SERPL-MCNC: 1.23 NG/DL (ref 0.53–1.43)
TSH SERPL DL<=0.005 MIU/L-ACNC: 1.57 UIU/ML (ref 0.38–5.33)
VIT B12 SERPL-MCNC: 307 PG/ML (ref 211–911)

## 2019-11-13 PROCEDURE — 99214 OFFICE O/P EST MOD 30 MIN: CPT | Performed by: INTERNAL MEDICINE

## 2019-11-13 NOTE — PROGRESS NOTES
Endocrinology Clinic Progress Note  PCP: Mago Mcintyre M.D.    HPI:  Jovita Rosa is a 44 y.o. old patient who comes in today for review of her hypothyroid and vitamin d deficiency.    Hypothyroid:  She is currently on Levothyroxine 125 mcg per day     Ref. Range 11/12/2019 10:29   TSH Latest Ref Range: 0.380 - 5.330 uIU/mL 1.570   Free T-4 Latest Ref Range: 0.53 - 1.43 ng/dL 1.23   T3 Latest Ref Range: 60.0 - 181.0 ng/dL 92.1   T3,Free Latest Ref Range: 2.40 - 4.20 pg/mL 2.93     Vitamin D deficiency: currently on Vitamin D supplementation but taking around 8000-21966 units only 3-4 days of the week .      Ref. Range 11/12/2019 10:29   25-Hydroxy   Vitamin D 25 Latest Ref Range: 30 - 100 ng/mL 32           ROS:  Constitutional: No weight loss  Cardiac: No palpitations or racing heart  Resp: No shortness of breath  Neuro: No numbness or tinging in feet  Endo: No heat or cold intolerance, no polyuria or polydipsia  All other systems were reviewed and were negative.    Past Medical History:  Patient Active Problem List    Diagnosis Date Noted   • Breast mass, right 03/23/2018   • Breast pain, right 03/16/2018   • Breast cyst, right 03/16/2018   • Abscess of right breast 03/16/2018   • Episodic low back pain 01/18/2018   • Leukocytosis 01/18/2018   • Seasonal allergic rhinitis 01/18/2018   • Hypothyroidism 01/18/2018   • Family history of ovarian cancer 01/18/2018   • Bulging of thoracic intervertebral disc 01/18/2018   • DDD (degenerative disc disease), lumbar 01/18/2018   • Weight gain 02/03/2017   • IUD (intrauterine device) in place 02/03/2017   • Psoriasis 02/03/2017       Past Surgical History:  History reviewed. No pertinent surgical history.    Allergies:  Patient has no known allergies.    Social History:  Social History     Socioeconomic History   • Marital status:      Spouse name: Not on file   • Number of children: Not on file   • Years of education: Not on file   • Highest education level:  Not on file   Occupational History   • Occupation:    Social Needs   • Financial resource strain: Not on file   • Food insecurity:     Worry: Not on file     Inability: Not on file   • Transportation needs:     Medical: Not on file     Non-medical: Not on file   Tobacco Use   • Smoking status: Never Smoker   • Smokeless tobacco: Never Used   Substance and Sexual Activity   • Alcohol use: Yes     Alcohol/week: 1.2 oz     Types: 2 Glasses of wine per week     Comment: occasionally    • Drug use: No   • Sexual activity: Yes     Partners: Male     Birth control/protection: I.U.D.   Lifestyle   • Physical activity:     Days per week: Not on file     Minutes per session: Not on file   • Stress: Not on file   Relationships   • Social connections:     Talks on phone: Not on file     Gets together: Not on file     Attends Voodoo service: Not on file     Active member of club or organization: Not on file     Attends meetings of clubs or organizations: Not on file     Relationship status: Not on file   • Intimate partner violence:     Fear of current or ex partner: Not on file     Emotionally abused: Not on file     Physically abused: Not on file     Forced sexual activity: Not on file   Other Topics Concern   • Not on file   Social History Narrative    , 2 children.        Family History:  Family History   Problem Relation Age of Onset   • Cancer Mother 65        ovarian   • Heart Disease Mother    • Stroke Mother         a fib   • Hypertension Mother    • Cancer Father         lung, skin- precancerous   • Lung Disease Father    • Hypertension Brother        Medications:    Current Outpatient Medications:   •  levothyroxine (SYNTHROID) 125 MCG Tab, Take 1 Tab by mouth Every morning on an empty stomach., Disp: 30 Tab, Rfl: 9  •  Cholecalciferol (VITAMIN D) 2000 UNIT Tab, Take  by mouth every day., Disp: , Rfl:   •  celecoxib (CELEBREX) 200 MG Cap, Take 200 mg by mouth every day., Disp: , Rfl:   •  levothyroxine  "(SYNTHROID) 125 MCG Tab, Take 1 Tab by mouth Every morning on an empty stomach., Disp: 90 Tab, Rfl: 1    Labs: Reviewed    Physical Examination:  Vital signs: BP (!) 88/62 (BP Location: Left arm, Patient Position: Sitting, BP Cuff Size: Adult)   Pulse 83   Ht 1.676 m (5' 6\")   Wt 77.1 kg (170 lb)   SpO2 94%   BMI 27.44 kg/m²  Body mass index is 27.44 kg/m².  General: No apparent distress, cooperative  Eyes: No scleral icterus or discharge  ENMT: Normal on external inspection of nose, lips, normal thyroid exam  Neck: No abnormal masses on inspection  Resp: Normal effort, clear to auscultation bilaterally   CVS: Regular rate and rhythm, S1 S2 normal, no murmur   Extremities: No edema  Abdomen: abdominal obesity present  Neuro: Alert and oriented  Skin: No rash  Psych: Normal mood and affect, intact memory and able to make informed decisions    Assessment and Plan:  1. Hypothyroidism, unspecified type  Continue current dose; feels well overall.     2. Vitamin D deficiency  Not taking vit D regularly as per HPI; will work on compliance.    3. Vit B 12 deficiency:  Sublingual B 12 - 2500 mcg daily.     Return in about 6 months (around 5/13/2020).    Thank you for allowing me to participate in the care of this patient.    Matt Zuniga M.D.  11/12/19    CC:   Mago Mcintyre M.D.    This note was created using voice recognition software (Dragon). The accuracy of the dictation is limited by the abilities of the software. I have reviewed the note prior to signing, however some errors in grammar and context are still possible. If you have any questions related to this note please do not hesitate to contact our office.   This note was scribed by Cleo Dillard RN, CDE  "

## 2019-12-01 ENCOUNTER — PATIENT MESSAGE (OUTPATIENT)
Dept: MEDICAL GROUP | Facility: IMAGING CENTER | Age: 44
End: 2019-12-01

## 2019-12-02 ENCOUNTER — GYNECOLOGY VISIT (OUTPATIENT)
Dept: OBGYN | Facility: CLINIC | Age: 44
End: 2019-12-02
Payer: OTHER GOVERNMENT

## 2019-12-02 VITALS — DIASTOLIC BLOOD PRESSURE: 74 MMHG | SYSTOLIC BLOOD PRESSURE: 128 MMHG | BODY MASS INDEX: 27.44 KG/M2 | WEIGHT: 170 LBS

## 2019-12-02 DIAGNOSIS — Z97.5 IUD (INTRAUTERINE DEVICE) IN PLACE: ICD-10-CM

## 2019-12-02 DIAGNOSIS — Z01.419 WELL WOMAN EXAM WITH ROUTINE GYNECOLOGICAL EXAM: Primary | ICD-10-CM

## 2019-12-02 PROCEDURE — 99396 PREV VISIT EST AGE 40-64: CPT | Performed by: NURSE PRACTITIONER

## 2019-12-02 NOTE — PROGRESS NOTES
Jovita Rosa is a 44 y.o. y.o. female who presents for her Gynecologic Exam      HPI Comments: Pt presents for well woman exam. Pt has no complaints. No LMP recorded. Patient has had an implant.    She is currently sexually active with 1 male partner. No plans for child bearing in the near future. She has a Mirena in place, although she states it is due to be removed. She would like device replaced.  She states she has very little to no bleeding with Mirena. No concerns for STI or other infections.    Review of Systems   Pertinent positives documented in HPI and all other systems reviewed & are negative    All PMH, PSH, allergies, social history and FH reviewed and updated today:  Past Medical History:   Diagnosis Date   • Allergy     Seasonal   • Bulging of thoracic intervertebral disc    • Hypothyroidism      History reviewed. No pertinent surgical history.  Patient has no known allergies.  Social History     Socioeconomic History   • Marital status:      Spouse name: Not on file   • Number of children: Not on file   • Years of education: Not on file   • Highest education level: Not on file   Occupational History   • Occupation:    Social Needs   • Financial resource strain: Not on file   • Food insecurity:     Worry: Not on file     Inability: Not on file   • Transportation needs:     Medical: Not on file     Non-medical: Not on file   Tobacco Use   • Smoking status: Never Smoker   • Smokeless tobacco: Never Used   Substance and Sexual Activity   • Alcohol use: Yes     Alcohol/week: 1.2 oz     Types: 2 Glasses of wine per week     Comment: occasionally    • Drug use: No   • Sexual activity: Yes     Partners: Male     Birth control/protection: I.U.D.   Lifestyle   • Physical activity:     Days per week: Not on file     Minutes per session: Not on file   • Stress: Not on file   Relationships   • Social connections:     Talks on phone: Not on file     Gets together: Not on file     Attends  Restorationist service: Not on file     Active member of club or organization: Not on file     Attends meetings of clubs or organizations: Not on file     Relationship status: Not on file   • Intimate partner violence:     Fear of current or ex partner: Not on file     Emotionally abused: Not on file     Physically abused: Not on file     Forced sexual activity: Not on file   Other Topics Concern   • Not on file   Social History Narrative    , 2 children.      Family History   Problem Relation Age of Onset   • Cancer Mother 65        ovarian   • Heart Disease Mother    • Stroke Mother         a fib   • Hypertension Mother    • Cancer Father         lung, skin- precancerous   • Lung Disease Father    • Hypertension Brother      Medications:   Current Outpatient Medications Ordered in Epic   Medication Sig Dispense Refill   • levothyroxine (SYNTHROID) 125 MCG Tab Take 1 Tab by mouth Every morning on an empty stomach. 30 Tab 9   • levothyroxine (SYNTHROID) 125 MCG Tab Take 1 Tab by mouth Every morning on an empty stomach. 90 Tab 1   • Cholecalciferol (VITAMIN D) 2000 UNIT Tab Take  by mouth every day.     • celecoxib (CELEBREX) 200 MG Cap Take 200 mg by mouth every day.       No current [facility]-ordered facility-administered medications on file.           Objective:   Vital measurements:  There were no vitals taken for this visit.  There is no height or weight on file to calculate BMI. (Goal BM I>18 <25)    Physical Exam     Chaperone offered: yes    Nursing note and vitals reviewed.  Constitutional: She is oriented to person, place, and time. She appears well-developed and well-nourished. No distress.     HEENT:   Head: Normocephalic and atraumatic.   Right Ear: External ear normal.   Left Ear: External ear normal.   Nose: Nose normal.   Eyes: Conjunctivae and EOM are normal. Pupils are equal, round, and reactive to light. No scleral icterus.     Neck: Normal range of motion. Neck supple. No tracheal deviation present.  No thyromegaly present.     Pulmonary/Chest: Effort normal and breath sounds normal. No respiratory distress. She has no wheezes. She has no rales. She exhibits no tenderness.     Cardiovascular: Regular, rate and rhythm. No JVD.    Abdominal: Soft. Bowel sounds are normal. She exhibits no distension and no mass. No tenderness. She has no rebound and no guarding.     Breast:  Symmetrical, normal consistency without masses., No dimpling or skin changes, Normal nipples without discharge, no axillary lymphadenopathy, negative, Inspection negative. No nipple discharge or bleeding, No masses    Genitourinary:  Pelvic exam was performed with patient supine.  External genitalia with no abnormal pigmentation, labial fusion,rash, tenderness, lesion or injury to the labia bilaterally.  Vagina is moist with no lesions, foul discharge, erythema, tenderness or bleeding. No foreign body around the vagina or signs of injury.   Cervix exhibits no motion tenderness, no discharge and no friability.   Uterus is not deviated, not enlarged, not fixed and not tender.  Right adnexum displays no mass, no tenderness and no fullness. Left adnexum displays no mass, no tenderness and no fullness.     Musculoskeletal: Normal range of motion. She exhibits no edema and no tenderness.     Lymphadenopathy: She has no cervical adenopathy.     Neurological: She is alert and oriented to person, place, and time. She exhibits normal muscle tone.     Skin: Skin is warm and dry. No rash noted. She is not diaphoretic. No erythema. No pallor.     Psychiatric: She has a normal mood and affect. Her behavior is normal. Judgment and thought content normal.      Assessment:     1. Well woman exam with routine gynecological exam           Plan:   Pap and physical exam performed  Monthly SBE.  Counseling: breast self exam, mammography screening, STD prevention, family planning choices, menopause and adequate intake of calcium and vitamin D  Encourage exercise and  proper diet.  Mammograms starting @ age 40 annually.  See medications and orders placed in encounter report.    Referral placed for IUD removal and replacement.  Follow up for procedure. Tylenol or Ibuprofen before scheduled procedure. Or follow up sooner TREVOR GUTIERREZM, APRN

## 2019-12-02 NOTE — NON-PROVIDER
Pt here for annual exam   Pt states no complaints, but may have an  IUD  Good#[phone]  Pharmacy verified.

## 2019-12-11 ENCOUNTER — OFFICE VISIT (OUTPATIENT)
Dept: MEDICAL GROUP | Facility: IMAGING CENTER | Age: 44
End: 2019-12-11
Payer: OTHER GOVERNMENT

## 2019-12-11 ENCOUNTER — TELEPHONE (OUTPATIENT)
Dept: MEDICAL GROUP | Facility: IMAGING CENTER | Age: 44
End: 2019-12-11

## 2019-12-11 VITALS
BODY MASS INDEX: 27.32 KG/M2 | HEIGHT: 66 IN | RESPIRATION RATE: 18 BRPM | TEMPERATURE: 98.8 F | DIASTOLIC BLOOD PRESSURE: 70 MMHG | HEART RATE: 85 BPM | OXYGEN SATURATION: 97 % | WEIGHT: 170 LBS | SYSTOLIC BLOOD PRESSURE: 112 MMHG

## 2019-12-11 DIAGNOSIS — M54.50 ACUTE RIGHT-SIDED LOW BACK PAIN WITHOUT SCIATICA: ICD-10-CM

## 2019-12-11 DIAGNOSIS — E03.9 HYPOTHYROIDISM, UNSPECIFIED TYPE: ICD-10-CM

## 2019-12-11 DIAGNOSIS — M54.50 EPISODIC LOW BACK PAIN: ICD-10-CM

## 2019-12-11 DIAGNOSIS — M62.830 MUSCLE SPASM OF BACK: ICD-10-CM

## 2019-12-11 PROCEDURE — 99214 OFFICE O/P EST MOD 30 MIN: CPT | Performed by: FAMILY MEDICINE

## 2019-12-11 RX ORDER — DICLOFENAC SODIUM 75 MG/1
75 TABLET, DELAYED RELEASE ORAL 2 TIMES DAILY
Qty: 60 TAB | Refills: 0 | Status: SHIPPED | OUTPATIENT
Start: 2019-12-11 | End: 2021-09-16 | Stop reason: SDUPTHER

## 2019-12-11 RX ORDER — DICLOFENAC SODIUM 75 MG/1
75 TABLET, DELAYED RELEASE ORAL 2 TIMES DAILY
Qty: 60 TAB | Refills: 0 | Status: CANCELLED | OUTPATIENT
Start: 2019-12-11

## 2019-12-11 RX ORDER — METHYLPREDNISOLONE 4 MG/1
TABLET ORAL
Qty: 21 TAB | Refills: 0 | Status: SHIPPED | OUTPATIENT
Start: 2019-12-11

## 2019-12-11 ASSESSMENT — PAIN SCALES - GENERAL: PAINLEVEL: 10=SEVERE PAIN

## 2019-12-11 NOTE — TELEPHONE ENCOUNTER
Patient walked into office to talk about back pain. Patient states she has bulging discs and usually would take a corticosteroid to help with the pain. She states she threw out her back and is having a hard time keeping her pain under control. She states pain when moving in certain positions (getting out of her car, sitting down) is a 10/10. When she is just standing or walking pain is about 7-8/10. She states one of her friends mentioned diclofenac sodium to her. She is wondering if this might be a good possibility.   Scheduled patient with Dr. Mcintyre to go over back pain and possible medications.

## 2019-12-11 NOTE — PROGRESS NOTES
"  SUBJECTIVE:        Chief Complaint   Patient presents with   • Back Pain     lower back pain x1.5 week. no injury to back, \"has been dealing with it her whole life\" taking celebrex as needed. when standing its a 10. can't sit.        HPI:     Jovita Rosa is a 44 y.o. female here for low back pain symptoms.  Patient has had prior history of low back pain issues with which intermittently she may have flareups.  About 2 weeks ago after a trip gradually noticed some symptoms with low back pain in the right side this time.  She did take the Medrol Dosepak which she has for emergencies and seems to have improved her symptoms up to 75%.  Completed the pack about 10 days ago. Would like to have medication on hand for future if needed for emergencies. Denies any new injuries to the area.  No lower extremity numbness, tingling or weakness.  No bowel or bladder incontinence.  Has had issues with sitting with her back pain.  Worse with back flexion.  Feels frustrated due to her symptoms, wondering about future.  She did have prior lumbar x-ray and MRI done in the past, will obtain past results.  Has a general routine for her back care.  Yoga, stretching, inversion table.   Boot camp gym helps with core.   Heating pad.   Celebrex currently- helps in middle of day, but would like to try diclofenac.  States this has helped her friend in the past.   Finds that her lower back has not been loosening up in the mornings.  She does have Flexeril at home to use if needed but has not used it recently.  Her symptoms have been overall up and down.  Last did physical therapy about 2 years ago which seemed to help.  She has done Carboptic therapy in the past which did not seem to help.  She has been doing acupuncture therapy with Dr. Moncada lately.  Asking about possible PRP therapy.     Hypothyroidism- stable on levothyroxine 125 mcg daily.       ROS:  Denies any recent fevers or chills. No nausea or vomiting. No diarrhea. No chest " "pains or shortness of breath. No lower extremity edema.    Current Outpatient Medications on File Prior to Visit   Medication Sig Dispense Refill   • levothyroxine (SYNTHROID) 125 MCG Tab Take 1 Tab by mouth Every morning on an empty stomach. 30 Tab 9   • Cholecalciferol (VITAMIN D) 2000 UNIT Tab Take  by mouth every day.     • levothyroxine (SYNTHROID) 125 MCG Tab Take 1 Tab by mouth Every morning on an empty stomach. 90 Tab 1     No current facility-administered medications on file prior to visit.        No Known Allergies    Patient Active Problem List    Diagnosis Date Noted   • Breast mass, right 03/23/2018   • Breast pain, right 03/16/2018   • Breast cyst, right 03/16/2018   • Abscess of right breast 03/16/2018   • Episodic low back pain 01/18/2018   • Leukocytosis 01/18/2018   • Seasonal allergic rhinitis 01/18/2018   • Hypothyroidism 01/18/2018   • Family history of ovarian cancer 01/18/2018   • Bulging of thoracic intervertebral disc 01/18/2018   • DDD (degenerative disc disease), lumbar 01/18/2018   • Weight gain 02/03/2017   • IUD (intrauterine device) in place 02/03/2017   • Psoriasis 02/03/2017       Past Medical History:   Diagnosis Date   • Allergy     Seasonal   • Bulging of thoracic intervertebral disc    • Hypothyroidism        OBJECTIVE:   /70 (BP Location: Left arm, Patient Position: Standing, BP Cuff Size: Adult)   Pulse 85   Temp 37.1 °C (98.8 °F) (Temporal)   Resp 18   Ht 1.676 m (5' 6\")   Wt 77.1 kg (170 lb)   SpO2 97%   BMI 27.44 kg/m²   General: Well-developed well-nourished female, no acute distress  Back: limited exam due to discomfort, no spinal or paraspinal TTP, limited ROM due to pain, no significant pain with extension.   Extremities: no cyanosis, clubbing, edema. Normal strength in lower extremities.   Skin: Warm and dry  Psych: appropriate mood and affect     Ref. Range 11/12/2019 10:29   25-Hydroxy   Vitamin D 25 Latest Ref Range: 30 - 100 ng/mL 32   Vitamin B12 -True " Cobalamin Latest Ref Range: 211 - 911 pg/mL 307   TSH Latest Ref Range: 0.380 - 5.330 uIU/mL 1.570   Free T-4 Latest Ref Range: 0.53 - 1.43 ng/dL 1.23   T3 Latest Ref Range: 60.0 - 181.0 ng/dL 92.1   T3,Free Latest Ref Range: 2.40 - 4.20 pg/mL 2.93         ASSESSMENT/PLAN:    44 y.o.female    1. Acute right-sided low back pain without sciatica- recent flare up of symptoms.    -Discontinue celebrex and start voltaren therapy. Continue acupuncture therapy. Routine exercises/strengthening/stretching.  Modify activities. Discussed appropriate ergonomics and techniques for activities. Recommend anti- inflammatory diet. Consider supplement therapy.  Reviewed precautions and potential side effects of medication therapy.   Recommend consider physical therapy- biomanual therapy, discussed [facility], and learning about myofascial pain. Obtain prior imaging. Discussed possible repeat imaging if needed in future. Follow up if no further improvements in 1-2 weeks.  diclofenac EC (VOLTAREN) 75 MG Tablet Delayed Response   2. Episodic low back pain- recently with acute symptoms. Medrol dose pack refilled to use in future if severe flare up of symptoms.   Reviewed precautions and potential side effects of medication therapy.   methylPREDNISolone (MEDROL DOSEPAK) 4 MG Tablet Therapy Pack   3. Muscle spasm of back   -Patient has flexeril to use at night time as needed. Recommend heat otherwise as above.         4. Hypothyroidism, unspecified type- stable, continue current medication.        Follow up if no improvement in symptoms.       This medical record contains text that has been entered with the assistance of computer voice recognition and dictation software.  Therefore, it may contain unintended errors in text, spelling, punctuation, or grammar.

## 2020-04-13 DIAGNOSIS — E03.9 HYPOTHYROIDISM, UNSPECIFIED TYPE: ICD-10-CM

## 2020-04-13 RX ORDER — LEVOTHYROXINE SODIUM 0.12 MG/1
125 TABLET ORAL
Qty: 90 TAB | Refills: 1 | Status: SHIPPED | OUTPATIENT
Start: 2020-04-13 | End: 2020-09-21

## 2020-04-13 NOTE — TELEPHONE ENCOUNTER
Received request via: Patient    Was the patient seen in the last year in this department? Yes    Does the patient have an active prescription (recently filled or refills available) for medication(s) requested? No           levothyroxine (SYNTHROID) 125 MCG Tab [Matt Zuniga M.D.]

## 2020-05-07 ENCOUNTER — HOSPITAL ENCOUNTER (OUTPATIENT)
Dept: LAB | Facility: MEDICAL CENTER | Age: 45
End: 2020-05-07
Attending: INTERNAL MEDICINE
Payer: OTHER GOVERNMENT

## 2020-05-07 DIAGNOSIS — E03.9 HYPOTHYROIDISM, UNSPECIFIED TYPE: ICD-10-CM

## 2020-05-07 DIAGNOSIS — E55.9 VITAMIN D DEFICIENCY: ICD-10-CM

## 2020-05-07 DIAGNOSIS — E53.8 VITAMIN B 12 DEFICIENCY: ICD-10-CM

## 2020-05-07 LAB
25(OH)D3 SERPL-MCNC: 59 NG/ML (ref 30–100)
T3 SERPL-MCNC: 62 NG/DL (ref 60–181)
T3FREE SERPL-MCNC: 2.34 PG/ML (ref 2.4–4.2)
T4 FREE SERPL-MCNC: 1.36 NG/DL (ref 0.93–1.7)
TSH SERPL DL<=0.005 MIU/L-ACNC: 1.93 UIU/ML (ref 0.38–5.33)
VIT B12 SERPL-MCNC: 1247 PG/ML (ref 211–911)

## 2020-05-07 PROCEDURE — 84480 ASSAY TRIIODOTHYRONINE (T3): CPT

## 2020-05-07 PROCEDURE — 84443 ASSAY THYROID STIM HORMONE: CPT

## 2020-05-07 PROCEDURE — 36415 COLL VENOUS BLD VENIPUNCTURE: CPT

## 2020-05-07 PROCEDURE — 82306 VITAMIN D 25 HYDROXY: CPT

## 2020-05-07 PROCEDURE — 84481 FREE ASSAY (FT-3): CPT

## 2020-05-07 PROCEDURE — 84439 ASSAY OF FREE THYROXINE: CPT

## 2020-05-07 PROCEDURE — 82607 VITAMIN B-12: CPT

## 2020-05-12 NOTE — PROGRESS NOTES
"Endocrinology Clinic Progress Note  PCP: Mago Mcintyre M.D.  This encounter was conducted via {Platform used:34693::\"Zoom \"}.   Verbal consent was obtained. Patient's identity was verified.  HPI:  Jovita Rosa is a 44 y.o. old patient who is seen today for review of her hypothyroid and Vitamin D deficiency.   She is currently on Levothyroxine 125 mcg per day on and empty stomach.  States she is compliant with medication.       Ref. Range 5/7/2020 17:55   TSH Latest Ref Range: 0.380 - 5.330 uIU/mL 1.930   Free T-4 Latest Ref Range: 0.93 - 1.70 ng/dL 1.36   T3 Latest Ref Range: 60.0 - 181.0 ng/dL 62.0   T3,Free Latest Ref Range: 2.40 - 4.20 pg/mL 2.34 (L)     Vitamin D deficiency well controlled on Vitamin D supplementation of 2000 iu per day.      Ref. Range 5/7/2020 17:55   25-Hydroxy   Vitamin D 25 Latest Ref Range: 30 - 100 ng/mL 59             ROS:  Constitutional: No weight loss  Cardiac: No palpitations or racing heart  Resp: No shortness of breath  Neuro: No numbness or tinging in feet  Endo: No heat or cold intolerance, no polyuria or polydipsia  All other systems were reviewed and were negative.    Past Medical History:  Patient Active Problem List    Diagnosis Date Noted   • Breast mass, right 03/23/2018   • Breast pain, right 03/16/2018   • Breast cyst, right 03/16/2018   • Abscess of right breast 03/16/2018   • Episodic low back pain 01/18/2018   • Leukocytosis 01/18/2018   • Seasonal allergic rhinitis 01/18/2018   • Hypothyroidism 01/18/2018   • Family history of ovarian cancer 01/18/2018   • Bulging of thoracic intervertebral disc 01/18/2018   • DDD (degenerative disc disease), lumbar 01/18/2018   • Weight gain 02/03/2017   • IUD (intrauterine device) in place 02/03/2017   • Psoriasis 02/03/2017       Past Surgical History:  No past surgical history on file.    Allergies:  Patient has no known allergies.    Social History:  Social History     Socioeconomic History   • Marital status:      " Spouse name: Not on file   • Number of children: Not on file   • Years of education: Not on file   • Highest education level: Not on file   Occupational History   • Occupation:    Social Needs   • Financial resource strain: Not on file   • Food insecurity     Worry: Not on file     Inability: Not on file   • Transportation needs     Medical: Not on file     Non-medical: Not on file   Tobacco Use   • Smoking status: Never Smoker   • Smokeless tobacco: Never Used   Substance and Sexual Activity   • Alcohol use: Yes     Alcohol/week: 1.2 oz     Types: 2 Glasses of wine per week     Comment: occasionally    • Drug use: No   • Sexual activity: Yes     Partners: Male     Birth control/protection: I.U.D.   Lifestyle   • Physical activity     Days per week: Not on file     Minutes per session: Not on file   • Stress: Not on file   Relationships   • Social connections     Talks on phone: Not on file     Gets together: Not on file     Attends Anglican service: Not on file     Active member of club or organization: Not on file     Attends meetings of clubs or organizations: Not on file     Relationship status: Not on file   • Intimate partner violence     Fear of current or ex partner: Not on file     Emotionally abused: Not on file     Physically abused: Not on file     Forced sexual activity: Not on file   Other Topics Concern   • Not on file   Social History Narrative    , 2 children.        Family History:  Family History   Problem Relation Age of Onset   • Cancer Mother 65        ovarian   • Heart Disease Mother    • Stroke Mother         a fib   • Hypertension Mother    • Cancer Father         lung, skin- precancerous   • Lung Disease Father    • Hypertension Brother        Medications:    Current Outpatient Medications:   •  levothyroxine (SYNTHROID) 125 MCG Tab, Take 1 Tab by mouth Every morning on an empty stomach., Disp: 90 Tab, Rfl: 1  •  methylPREDNISolone (MEDROL DOSEPAK) 4 MG Tablet Therapy Pack, As  directed on the packaging label., Disp: 21 Tab, Rfl: 0  •  diclofenac EC (VOLTAREN) 75 MG Tablet Delayed Response, Take 1 Tab by mouth 2 times a day., Disp: 60 Tab, Rfl: 0  •  levothyroxine (SYNTHROID) 125 MCG Tab, Take 1 Tab by mouth Every morning on an empty stomach., Disp: 30 Tab, Rfl: 9  •  Cholecalciferol (VITAMIN D) 2000 UNIT Tab, Take  by mouth every day., Disp: , Rfl:     Labs: Reviewed    Physical Examination:  Vital signs: There were no vitals taken for this visit. There is no height or weight on file to calculate BMI.  General: No apparent distress, cooperative  Eyes: No scleral icterus or discharge  ENMT: Normal on external inspection of nose, lips, normal thyroid exam  Neck: No abnormal masses on inspection  Resp: Normal effort, clear to auscultation bilaterally   CVS: Regular rate and rhythm, S1 S2 normal, no murmur   Extremities: No edema  Abdomen: abdominal obesity present  Neuro: Alert and oriented  Skin: No rash  Psych: Normal mood and affect, intact memory and able to make informed decisions    Assessment and Plan:  1. Hypothyroidism, unspecified type  ***    2. Vitamin D deficiency  ***      No follow-ups on file.    Thank you for allowing me to participate in the care of this patient.    Cleo Dillard R.N.  05/12/20    CC:   Mago Mcintyre M.D.    This note was created using voice recognition software (Dragon). The accuracy of the dictation is limited by the abilities of the software. I have reviewed the note prior to signing, however some errors in grammar and context are still possible. If you have any questions related to this note please do not hesitate to contact our office.

## 2020-05-13 ENCOUNTER — APPOINTMENT (OUTPATIENT)
Dept: ENDOCRINOLOGY | Facility: MEDICAL CENTER | Age: 45
End: 2020-05-13
Payer: OTHER GOVERNMENT

## 2020-05-22 ENCOUNTER — OFFICE VISIT (OUTPATIENT)
Dept: MEDICAL GROUP | Facility: IMAGING CENTER | Age: 45
End: 2020-05-22
Payer: OTHER GOVERNMENT

## 2020-05-22 VITALS
SYSTOLIC BLOOD PRESSURE: 108 MMHG | HEART RATE: 56 BPM | HEIGHT: 66 IN | WEIGHT: 171 LBS | DIASTOLIC BLOOD PRESSURE: 68 MMHG | BODY MASS INDEX: 27.48 KG/M2 | TEMPERATURE: 98.5 F | RESPIRATION RATE: 12 BRPM | OXYGEN SATURATION: 97 %

## 2020-05-22 DIAGNOSIS — G47.00 INSOMNIA, UNSPECIFIED TYPE: ICD-10-CM

## 2020-05-22 PROCEDURE — 99213 OFFICE O/P EST LOW 20 MIN: CPT | Performed by: FAMILY MEDICINE

## 2020-05-22 RX ORDER — ZOLPIDEM TARTRATE 10 MG/1
10 TABLET ORAL NIGHTLY PRN
Qty: 15 TAB | Refills: 0 | Status: SHIPPED
Start: 2020-05-22 | End: 2021-09-16 | Stop reason: SDUPTHER

## 2020-05-22 SDOH — HEALTH STABILITY: MENTAL HEALTH: HOW OFTEN DO YOU HAVE 6 OR MORE DRINKS ON ONE OCCASION?: NEVER

## 2020-05-22 SDOH — HEALTH STABILITY: MENTAL HEALTH: HOW MANY STANDARD DRINKS CONTAINING ALCOHOL DO YOU HAVE ON A TYPICAL DAY?: 1 OR 2

## 2020-05-22 SDOH — HEALTH STABILITY: MENTAL HEALTH: HOW OFTEN DO YOU HAVE A DRINK CONTAINING ALCOHOL?: 4 OR MORE TIMES A WEEK

## 2020-05-22 ASSESSMENT — PATIENT HEALTH QUESTIONNAIRE - PHQ9: CLINICAL INTERPRETATION OF PHQ2 SCORE: 0

## 2020-05-22 ASSESSMENT — PAIN SCALES - GENERAL: PAINLEVEL: NO PAIN

## 2020-05-22 NOTE — PROGRESS NOTES
SUBJECTIVE:      Chief Complaint   Patient presents with   • Insomnia     request for ambien       HPI:     Jovita Rosa is a 44 y.o. female with hypothyroidism here for patient symptoms of insomnia.    She has been on Ambien 10 mg in the past which was prescribed by another provider and has tolerated well.  Usually may need to take the medication about once a year as needed. Has not had any issues with use of medication.  Denies any episodes of sleepwalking on medication.  That she will be leaving next week for a road trip with their trailer for the next month.  To have difficulty sleeping in the trailer.  Is having tried melatonin in the past.  Usually has some trouble staying asleep.  Thinks that being back on the keto diet has also triggered some symptoms of her insomnia.  Notes she is otherwise managing the restrictions and community concerns for COVID-19 otherwise well.      ROS:  Denies any recent fevers or chills. No nausea or vomiting. No diarrhea. No chest pains or shortness of breath. No lower extremity edema.    Current Outpatient Medications on File Prior to Visit   Medication Sig Dispense Refill   • methylPREDNISolone (MEDROL DOSEPAK) 4 MG Tablet Therapy Pack As directed on the packaging label. 21 Tab 0   • levothyroxine (SYNTHROID) 125 MCG Tab Take 1 Tab by mouth Every morning on an empty stomach. 30 Tab 9   • Cholecalciferol (VITAMIN D) 2000 UNIT Tab Take  by mouth every day.     • levothyroxine (SYNTHROID) 125 MCG Tab Take 1 Tab by mouth Every morning on an empty stomach. 90 Tab 1   • diclofenac EC (VOLTAREN) 75 MG Tablet Delayed Response Take 1 Tab by mouth 2 times a day. (Patient not taking: Reported on 5/22/2020) 60 Tab 0     No current facility-administered medications on file prior to visit.        No Known Allergies    Patient Active Problem List    Diagnosis Date Noted   • Breast mass, right 03/23/2018   • Breast pain, right 03/16/2018   • Breast cyst, right 03/16/2018   • Abscess of  "right breast 03/16/2018   • Episodic low back pain 01/18/2018   • Leukocytosis 01/18/2018   • Seasonal allergic rhinitis 01/18/2018   • Hypothyroidism 01/18/2018   • Family history of ovarian cancer 01/18/2018   • Bulging of thoracic intervertebral disc 01/18/2018   • DDD (degenerative disc disease), lumbar 01/18/2018   • Weight gain 02/03/2017   • IUD (intrauterine device) in place 02/03/2017   • Psoriasis 02/03/2017       Past Medical History:   Diagnosis Date   • Allergy     Seasonal   • Bulging of thoracic intervertebral disc    • Hypothyroidism          OBJECTIVE:   /68   Pulse (!) 56   Temp 36.9 °C (98.5 °F)   Resp 12   Ht 1.676 m (5' 6\")   Wt 77.6 kg (171 lb)   SpO2 97%   BMI 27.60 kg/m²   General: Well-developed well-nourished female, no acute distress  Neck: supple, no lymphadenopathy- cervical or supraclavicular, no thyromegaly  Cardiovascular: regular rate and rhythm, no murmurs, gallops, rubs  Lungs: clear to auscultation bilaterally, no wheezes, crackles, or rhonchi  Extremities: no cyanosis, clubbing, edema  Skin: Warm and dry  Psych: appropriate mood and affect        ASSESSMENT/PLAN:    44 y.o.female with hypothyroidism.    1. Insomnia, unspecified type  zolpidem (AMBIEN) 10 MG Tab   -Reviewed possible options for medication therapy consideration in future.    She may consider use of chamomile tea or lemon balm.  Reviewed precautions and potential side effects of medication therapy with Ambien.    Recommend use only as needed.  Discussed possible habit-forming effects of medication with regular use.  Counseled on sleep hygiene practices.    Follow-up as needed.    This medical record contains text that has been entered with the assistance of computer voice recognition and dictation software.  Therefore, it may contain unintended errors in text, spelling, punctuation, or grammar.                          "

## 2020-07-13 ENCOUNTER — HOSPITAL ENCOUNTER (OUTPATIENT)
Dept: RADIOLOGY | Facility: MEDICAL CENTER | Age: 45
End: 2020-07-13
Attending: FAMILY MEDICINE
Payer: OTHER GOVERNMENT

## 2020-07-13 DIAGNOSIS — Z12.31 ENCOUNTER FOR SCREENING MAMMOGRAM FOR BREAST CANCER: ICD-10-CM

## 2020-07-13 PROCEDURE — 77067 SCR MAMMO BI INCL CAD: CPT

## 2020-07-27 ENCOUNTER — TELEPHONE (OUTPATIENT)
Dept: MEDICAL GROUP | Facility: IMAGING CENTER | Age: 45
End: 2020-07-27

## 2020-07-27 NOTE — TELEPHONE ENCOUNTER
Pt stopped by office today. She received an order for an MRI of her back and was inquiring if it would be necessary for Dr. Mcintyre to cosign the order for insurance purposes. Advised pt to check with insurance for coverage of the test and to let us know if she has any trouble. Pt agrees. No further questions at this time.

## 2020-09-01 NOTE — PROGRESS NOTES
"Endocrinology Clinic Progress Note  PCP: Mago Mcintyre M.D.    HPI:  Jovita Rosa is a 45 y.o. old patient who is seen today for review of her endocrine problems.   1. Hypothyroid: currently on Levothyroxine 125 mcg daily first thing in the morning on an empty stomach.      Ref. Range 5/7/2020 17:55   TSH Latest Ref Range: 0.380 - 5.330 uIU/mL 1.930   Free T-4 Latest Ref Range: 0.93 - 1.70 ng/dL 1.36   T3 Latest Ref Range: 60.0 - 181.0 ng/dL 62.0   T3,Free Latest Ref Range: 2.40 - 4.20 pg/mL 2.34 (L)     2. Vitamin D deficiency: well controlled on Vitamin D supplementation of 2000 iu per day.       Ref. Range 5/7/2020 17:55   25-Hydroxy   Vitamin D 25 Latest Ref Range: 30 - 100 ng/mL 59       ROS:  Constitutional: No weight loss  Cardiac: No palpitations or racing heart  Resp: No shortness of breath  Neuro: No numbness or tinging in feet  Endo: No heat or cold intolerance, no polyuria or polydipsia  All other systems were reviewed and were negative.      Labs: Reviewed    Physical Examination:  Vital signs: /66 (BP Location: Left arm, Patient Position: Sitting)   Pulse 71   Ht 1.727 m (5' 8\")   Wt 82 kg (180 lb 12.8 oz)   SpO2 97%   BMI 27.49 kg/m²  Body mass index is 27.49 kg/m².  General: No apparent distress, cooperative  Eyes: No scleral icterus or discharge  ENMT: Normal on external inspection of nose, lips  Neck: No abnormal masses on inspection  Resp: Normal effort, clear to auscultation bilaterally   CVS: Regular rate and rhythm, S1 S2 normal, no murmur   Extremities: No edema  Abdomen: abdominal obesity present  Neuro: Alert and oriented  Skin: No rash  Psych: Normal mood and affect, intact memory and able to make informed decisions    Assessment and Plan:  1. Hypothyroidism, unspecified type  Continue current regimen.   - TSH; Future  - T3 FREE; Future  - FREE THYROXINE; Future    2. Vitamin D deficiency  continue vitamin D with food as per HPI.  - VITAMIN D,25 HYDROXY; Future    3. " Vitamin B 12 deficiency  Continue B 12.   - VITAMIN B12; Future    No follow-ups on file.    Thank you for allowing me to participate in the care of this patient.    Matt Zuniga M.D.  09/01/20    CC:   Mago Mcintyre M.D.    This note was created using voice recognition software (Dragon). The accuracy of the dictation is limited by the abilities of the software. I have reviewed the note prior to signing, however some errors in grammar and context are still possible. If you have any questions related to this note please do not hesitate to contact our office.

## 2020-09-02 ENCOUNTER — OFFICE VISIT (OUTPATIENT)
Dept: ENDOCRINOLOGY | Facility: MEDICAL CENTER | Age: 45
End: 2020-09-02
Attending: INTERNAL MEDICINE
Payer: OTHER GOVERNMENT

## 2020-09-02 VITALS
WEIGHT: 180.8 LBS | OXYGEN SATURATION: 97 % | HEIGHT: 68 IN | BODY MASS INDEX: 27.4 KG/M2 | SYSTOLIC BLOOD PRESSURE: 108 MMHG | HEART RATE: 71 BPM | DIASTOLIC BLOOD PRESSURE: 66 MMHG

## 2020-09-02 DIAGNOSIS — E55.9 VITAMIN D DEFICIENCY: ICD-10-CM

## 2020-09-02 DIAGNOSIS — E03.9 HYPOTHYROIDISM, UNSPECIFIED TYPE: ICD-10-CM

## 2020-09-02 DIAGNOSIS — E53.8 VITAMIN B 12 DEFICIENCY: ICD-10-CM

## 2020-09-02 PROCEDURE — 99211 OFF/OP EST MAY X REQ PHY/QHP: CPT | Performed by: INTERNAL MEDICINE

## 2020-09-02 PROCEDURE — 99214 OFFICE O/P EST MOD 30 MIN: CPT | Performed by: INTERNAL MEDICINE

## 2020-09-11 ENCOUNTER — APPOINTMENT (OUTPATIENT)
Dept: RADIOLOGY | Facility: MEDICAL CENTER | Age: 45
End: 2020-09-11
Attending: SPECIALIST
Payer: OTHER GOVERNMENT

## 2020-09-18 ENCOUNTER — HOSPITAL ENCOUNTER (OUTPATIENT)
Dept: RADIOLOGY | Facility: MEDICAL CENTER | Age: 45
End: 2020-09-18
Attending: SPECIALIST
Payer: OTHER GOVERNMENT

## 2020-09-18 DIAGNOSIS — M51.36 DEGENERATION OF LUMBAR INTERVERTEBRAL DISC: ICD-10-CM

## 2020-09-18 DIAGNOSIS — E03.9 HYPOTHYROIDISM, UNSPECIFIED TYPE: ICD-10-CM

## 2020-09-18 DIAGNOSIS — M47.816 LUMBAR SPONDYLOSIS: ICD-10-CM

## 2020-09-18 DIAGNOSIS — M51.37 DEGENERATION OF LUMBAR OR LUMBOSACRAL INTERVERTEBRAL DISC: ICD-10-CM

## 2020-09-18 PROCEDURE — 72110 X-RAY EXAM L-2 SPINE 4/>VWS: CPT

## 2020-09-18 PROCEDURE — 72148 MRI LUMBAR SPINE W/O DYE: CPT

## 2020-09-18 NOTE — TELEPHONE ENCOUNTER
Received request via: Pharmacy    Was the patient seen in the last year in this department? Yes    Does the patient have an active prescription (recently filled or refills available) for medication(s) requested? No     L-THYROXINE TABS 125MCG        Will file in chart as: levothyroxine (SYNTHROID) 125 MCG Tab   Sig: TAKE 1 TABLET EVERY MORNING ON AN EMPTY STOMACH   Disp:  90 Tab    Refills:  3   Start: 9/18/2020   Class: Normal   Non-formulary For: Hypothyroidism, unspecified type   Last ordered: 5 months ago by Matt Zuniga M.D. Last refill: 6/22/2020   Rx #: [id number]

## 2020-09-20 DIAGNOSIS — Z13.0 SCREENING FOR DEFICIENCY ANEMIA: ICD-10-CM

## 2020-09-21 RX ORDER — LEVOTHYROXINE SODIUM 0.12 MG/1
TABLET ORAL
Qty: 90 TAB | Refills: 3 | Status: SHIPPED | OUTPATIENT
Start: 2020-09-21 | End: 2022-02-09 | Stop reason: SDUPTHER

## 2020-11-25 ENCOUNTER — HOSPITAL ENCOUNTER (OUTPATIENT)
Dept: LAB | Facility: MEDICAL CENTER | Age: 45
End: 2020-11-25
Attending: FAMILY MEDICINE
Payer: OTHER GOVERNMENT

## 2020-11-25 DIAGNOSIS — Z13.0 SCREENING FOR DEFICIENCY ANEMIA: ICD-10-CM

## 2020-11-25 LAB
BASOPHILS # BLD AUTO: 0.6 % (ref 0–1.8)
BASOPHILS # BLD: 0.05 K/UL (ref 0–0.12)
EOSINOPHIL # BLD AUTO: 0.26 K/UL (ref 0–0.51)
EOSINOPHIL NFR BLD: 2.9 % (ref 0–6.9)
ERYTHROCYTE [DISTWIDTH] IN BLOOD BY AUTOMATED COUNT: 38.8 FL (ref 35.9–50)
HCT VFR BLD AUTO: 41 % (ref 37–47)
HGB BLD-MCNC: 13.9 G/DL (ref 12–16)
IMM GRANULOCYTES # BLD AUTO: 0.02 K/UL (ref 0–0.11)
IMM GRANULOCYTES NFR BLD AUTO: 0.2 % (ref 0–0.9)
LYMPHOCYTES # BLD AUTO: 3.51 K/UL (ref 1–4.8)
LYMPHOCYTES NFR BLD: 39.4 % (ref 22–41)
MCH RBC QN AUTO: 29.8 PG (ref 27–33)
MCHC RBC AUTO-ENTMCNC: 33.9 G/DL (ref 33.6–35)
MCV RBC AUTO: 87.8 FL (ref 81.4–97.8)
MONOCYTES # BLD AUTO: 0.7 K/UL (ref 0–0.85)
MONOCYTES NFR BLD AUTO: 7.9 % (ref 0–13.4)
NEUTROPHILS # BLD AUTO: 4.37 K/UL (ref 2–7.15)
NEUTROPHILS NFR BLD: 49 % (ref 44–72)
NRBC # BLD AUTO: 0 K/UL
NRBC BLD-RTO: 0 /100 WBC
PLATELET # BLD AUTO: 348 K/UL (ref 164–446)
PMV BLD AUTO: 9.5 FL (ref 9–12.9)
RBC # BLD AUTO: 4.67 M/UL (ref 4.2–5.4)
WBC # BLD AUTO: 8.9 K/UL (ref 4.8–10.8)

## 2020-11-25 PROCEDURE — 85025 COMPLETE CBC W/AUTO DIFF WBC: CPT

## 2020-11-25 PROCEDURE — 36415 COLL VENOUS BLD VENIPUNCTURE: CPT

## 2020-12-02 ENCOUNTER — APPOINTMENT (RX ONLY)
Dept: URBAN - METROPOLITAN AREA CLINIC 35 | Facility: CLINIC | Age: 45
Setting detail: DERMATOLOGY
End: 2020-12-02

## 2020-12-02 VITALS — WEIGHT: 168 LBS | HEIGHT: 66 IN

## 2020-12-02 DIAGNOSIS — L71.8 OTHER ROSACEA: ICD-10-CM

## 2020-12-02 DIAGNOSIS — L73.8 OTHER SPECIFIED FOLLICULAR DISORDERS: ICD-10-CM

## 2020-12-02 DIAGNOSIS — L82.1 OTHER SEBORRHEIC KERATOSIS: ICD-10-CM

## 2020-12-02 DIAGNOSIS — D22 MELANOCYTIC NEVI: ICD-10-CM

## 2020-12-02 DIAGNOSIS — L81.4 OTHER MELANIN HYPERPIGMENTATION: ICD-10-CM

## 2020-12-02 DIAGNOSIS — Z71.89 OTHER SPECIFIED COUNSELING: ICD-10-CM

## 2020-12-02 PROBLEM — D22.5 MELANOCYTIC NEVI OF TRUNK: Status: ACTIVE | Noted: 2020-12-02

## 2020-12-02 PROBLEM — D22.39 MELANOCYTIC NEVI OF OTHER PARTS OF FACE: Status: ACTIVE | Noted: 2020-12-02

## 2020-12-02 PROCEDURE — ? PRESCRIPTION

## 2020-12-02 PROCEDURE — ? COUNSELING

## 2020-12-02 PROCEDURE — 99214 OFFICE O/P EST MOD 30 MIN: CPT

## 2020-12-02 PROCEDURE — ? TREATMENT REGIMEN

## 2020-12-02 RX ORDER — IVERMECTIN 10 MG/G
1 CREAM TOPICAL
Qty: 1 | Refills: 6 | Status: ERX | COMMUNITY
Start: 2020-12-02

## 2020-12-02 RX ORDER — DOXYCYCLINE 40 MG/1
1 CAPSULE ORAL
Qty: 30 | Refills: 3 | Status: ERX | COMMUNITY
Start: 2020-12-02 | End: 2021-12-01

## 2020-12-02 RX ADMIN — DOXYCYCLINE 1: 40 CAPSULE ORAL at 00:00

## 2020-12-02 RX ADMIN — IVERMECTIN 1: 10 CREAM TOPICAL at 00:00

## 2020-12-02 ASSESSMENT — LOCATION DETAILED DESCRIPTION DERM
LOCATION DETAILED: RIGHT CENTRAL MALAR CHEEK
LOCATION DETAILED: LEFT SUPERIOR UPPER BACK
LOCATION DETAILED: LEFT INFERIOR CENTRAL MALAR CHEEK
LOCATION DETAILED: LEFT MEDIAL MALAR CHEEK
LOCATION DETAILED: LEFT MID-UPPER BACK
LOCATION DETAILED: RIGHT SUPERIOR MEDIAL FOREHEAD
LOCATION DETAILED: RIGHT MEDIAL SUPERIOR CHEST

## 2020-12-02 ASSESSMENT — LOCATION ZONE DERM
LOCATION ZONE: FACE
LOCATION ZONE: TRUNK

## 2020-12-02 ASSESSMENT — LOCATION SIMPLE DESCRIPTION DERM
LOCATION SIMPLE: RIGHT FOREHEAD
LOCATION SIMPLE: CHEST
LOCATION SIMPLE: LEFT CHEEK
LOCATION SIMPLE: LEFT UPPER BACK
LOCATION SIMPLE: RIGHT CHEEK

## 2020-12-02 NOTE — PROCEDURE: TREATMENT REGIMEN
Initiate Treatment: Oracea 40 mg capsule,immediate - delay release Take one capsule once a day\\n\\nSoolantra 1 % topical cream Apply to facial skin once daily.
Otc Regimen: Ocusoft
Detail Level: Simple

## 2020-12-14 ENCOUNTER — RX ONLY (OUTPATIENT)
Age: 45
Setting detail: RX ONLY
End: 2020-12-14

## 2020-12-14 RX ORDER — METRONIDAZOLE 7.5 MG/ML
LOTION TOPICAL
Qty: 1 | Refills: 0 | Status: ERX | COMMUNITY
Start: 2020-12-14

## 2020-12-28 ENCOUNTER — RX ONLY (OUTPATIENT)
Age: 45
Setting detail: RX ONLY
End: 2020-12-28

## 2020-12-28 RX ORDER — DOXYCYCLINE HYCLATE 50 MG/1
1 CAPSULE, GELATIN COATED ORAL DAILY
Qty: 30 | Refills: 3 | Status: ERX | COMMUNITY
Start: 2020-12-28 | End: 2021-12-01

## 2021-03-03 ENCOUNTER — APPOINTMENT (RX ONLY)
Dept: URBAN - METROPOLITAN AREA CLINIC 35 | Facility: CLINIC | Age: 46
Setting detail: DERMATOLOGY
End: 2021-03-03

## 2021-03-03 DIAGNOSIS — L71.8 OTHER ROSACEA: ICD-10-CM

## 2021-03-03 DIAGNOSIS — L73.8 OTHER SPECIFIED FOLLICULAR DISORDERS: ICD-10-CM

## 2021-03-03 DIAGNOSIS — Z71.89 OTHER SPECIFIED COUNSELING: ICD-10-CM

## 2021-03-03 PROCEDURE — ? SUNSCREEN RECOMMENDATIONS

## 2021-03-03 PROCEDURE — ? ADDITIONAL NOTES

## 2021-03-03 PROCEDURE — ? COUNSELING

## 2021-03-03 PROCEDURE — ? PRESCRIPTION

## 2021-03-03 PROCEDURE — 99213 OFFICE O/P EST LOW 20 MIN: CPT

## 2021-03-03 PROCEDURE — ? TREATMENT REGIMEN

## 2021-03-03 PROCEDURE — ? BENIGN DESTRUCTION COSMETIC

## 2021-03-03 RX ORDER — DOXYCYCLINE HYCLATE 50 MG/1
1 CAPSULE, GELATIN COATED ORAL DAILY
Qty: 90 | Refills: 3 | Status: ERX | COMMUNITY
Start: 2021-03-03 | End: 2021-12-01

## 2021-03-03 RX ADMIN — DOXYCYCLINE HYCLATE 1: 50 CAPSULE, GELATIN COATED ORAL at 00:00

## 2021-03-03 ASSESSMENT — LOCATION DETAILED DESCRIPTION DERM
LOCATION DETAILED: RIGHT SUPERIOR MEDIAL FOREHEAD
LOCATION DETAILED: RIGHT CENTRAL MALAR CHEEK
LOCATION DETAILED: LEFT INFERIOR CENTRAL MALAR CHEEK
LOCATION DETAILED: RIGHT INFERIOR MEDIAL FOREHEAD

## 2021-03-03 ASSESSMENT — LOCATION SIMPLE DESCRIPTION DERM
LOCATION SIMPLE: LEFT CHEEK
LOCATION SIMPLE: RIGHT FOREHEAD
LOCATION SIMPLE: RIGHT CHEEK

## 2021-03-03 ASSESSMENT — LOCATION ZONE DERM: LOCATION ZONE: FACE

## 2021-03-03 NOTE — PROCEDURE: TREATMENT REGIMEN
Continue Regimen: - doxycycline 50mg\\n\\n- Soolantra 1 % topical cream Apply to facial skin once daily.
Otc Regimen: Azaelic acid
Detail Level: Simple

## 2021-03-03 NOTE — PROCEDURE: BENIGN DESTRUCTION COSMETIC
Post-Care Instructions: I reviewed with the patient in detail post-care instructions. Patient is to wear sunprotection, and avoid picking at any of the treated lesions. Pt may apply Vaseline to crusted or scabbing areas.
Anesthesia Volume In Cc: 0
Consent: The patient's consent was obtained including but not limited to risks of crusting, scabbing, blistering, scarring, darker or lighter pigmentary change, recurrence, incomplete removal and infection.
Price (Use Numbers Only, No Special Characters Or $): 0.00
Detail Level: Detailed

## 2021-07-21 ENCOUNTER — HOSPITAL ENCOUNTER (OUTPATIENT)
Dept: RADIOLOGY | Facility: MEDICAL CENTER | Age: 46
End: 2021-07-21
Attending: FAMILY MEDICINE
Payer: OTHER GOVERNMENT

## 2021-07-21 DIAGNOSIS — Z12.31 VISIT FOR SCREENING MAMMOGRAM: ICD-10-CM

## 2021-07-21 PROCEDURE — 77063 BREAST TOMOSYNTHESIS BI: CPT

## 2021-07-30 ENCOUNTER — HOSPITAL ENCOUNTER (OUTPATIENT)
Dept: RADIOLOGY | Facility: MEDICAL CENTER | Age: 46
End: 2021-07-30
Attending: FAMILY MEDICINE
Payer: OTHER GOVERNMENT

## 2021-07-30 DIAGNOSIS — R92.8 ABNORMAL MAMMOGRAM: ICD-10-CM

## 2021-07-30 PROCEDURE — 77065 DX MAMMO INCL CAD UNI: CPT | Mod: RT

## 2021-08-12 ENCOUNTER — APPOINTMENT (OUTPATIENT)
Dept: RADIOLOGY | Facility: MEDICAL CENTER | Age: 46
End: 2021-08-12
Attending: FAMILY MEDICINE
Payer: OTHER GOVERNMENT

## 2021-08-25 ENCOUNTER — APPOINTMENT (OUTPATIENT)
Dept: LAB | Facility: MEDICAL CENTER | Age: 46
End: 2021-08-25
Attending: INTERNAL MEDICINE
Payer: OTHER GOVERNMENT

## 2021-08-26 ENCOUNTER — HOSPITAL ENCOUNTER (OUTPATIENT)
Dept: RADIOLOGY | Facility: MEDICAL CENTER | Age: 46
End: 2021-08-26
Attending: FAMILY MEDICINE
Payer: OTHER GOVERNMENT

## 2021-08-26 DIAGNOSIS — R92.8 ABNORMAL FINDINGS ON DIAGNOSTIC IMAGING OF BREAST: ICD-10-CM

## 2021-08-26 LAB — PATHOLOGY CONSULT NOTE: NORMAL

## 2021-08-26 PROCEDURE — 77065 DX MAMMO INCL CAD UNI: CPT | Mod: RT

## 2021-08-26 PROCEDURE — 88305 TISSUE EXAM BY PATHOLOGIST: CPT

## 2021-09-07 ENCOUNTER — HOSPITAL ENCOUNTER (OUTPATIENT)
Dept: LAB | Facility: MEDICAL CENTER | Age: 46
End: 2021-09-07
Attending: FAMILY MEDICINE
Payer: OTHER GOVERNMENT

## 2021-09-07 DIAGNOSIS — E55.9 VITAMIN D DEFICIENCY: ICD-10-CM

## 2021-09-07 DIAGNOSIS — E53.8 VITAMIN B12 DEFICIENCY: ICD-10-CM

## 2021-09-07 DIAGNOSIS — E03.9 HYPOTHYROIDISM, UNSPECIFIED TYPE: ICD-10-CM

## 2021-09-07 LAB
25(OH)D3 SERPL-MCNC: 48 NG/ML (ref 30–100)
T3FREE SERPL-MCNC: 2.78 PG/ML (ref 2–4.4)
T4 FREE SERPL-MCNC: 1.6 NG/DL (ref 0.93–1.7)
TSH SERPL DL<=0.005 MIU/L-ACNC: 1.12 UIU/ML (ref 0.38–5.33)
VIT B12 SERPL-MCNC: 1419 PG/ML (ref 211–911)

## 2021-09-07 PROCEDURE — 84443 ASSAY THYROID STIM HORMONE: CPT

## 2021-09-07 PROCEDURE — 36415 COLL VENOUS BLD VENIPUNCTURE: CPT

## 2021-09-07 PROCEDURE — 82607 VITAMIN B-12: CPT

## 2021-09-07 PROCEDURE — 84481 FREE ASSAY (FT-3): CPT

## 2021-09-07 PROCEDURE — 82306 VITAMIN D 25 HYDROXY: CPT

## 2021-09-07 PROCEDURE — 84439 ASSAY OF FREE THYROXINE: CPT

## 2021-09-16 ENCOUNTER — OFFICE VISIT (OUTPATIENT)
Dept: MEDICAL GROUP | Facility: IMAGING CENTER | Age: 46
End: 2021-09-16
Payer: OTHER GOVERNMENT

## 2021-09-16 VITALS
BODY MASS INDEX: 27.48 KG/M2 | WEIGHT: 171 LBS | SYSTOLIC BLOOD PRESSURE: 110 MMHG | RESPIRATION RATE: 12 BRPM | DIASTOLIC BLOOD PRESSURE: 64 MMHG | HEIGHT: 66 IN | OXYGEN SATURATION: 96 % | HEART RATE: 74 BPM | TEMPERATURE: 97.9 F

## 2021-09-16 DIAGNOSIS — E03.9 HYPOTHYROIDISM, UNSPECIFIED TYPE: ICD-10-CM

## 2021-09-16 DIAGNOSIS — M54.50 EPISODIC LOW BACK PAIN: ICD-10-CM

## 2021-09-16 DIAGNOSIS — Z13.1 SCREENING FOR DIABETES MELLITUS: ICD-10-CM

## 2021-09-16 DIAGNOSIS — F51.02 TRANSIENT INSOMNIA: ICD-10-CM

## 2021-09-16 DIAGNOSIS — Z00.00 WELL ADULT EXAM: ICD-10-CM

## 2021-09-16 DIAGNOSIS — Z13.220 SCREENING, LIPID: ICD-10-CM

## 2021-09-16 DIAGNOSIS — E53.8 VITAMIN B12 DEFICIENCY: ICD-10-CM

## 2021-09-16 PROCEDURE — 99396 PREV VISIT EST AGE 40-64: CPT | Performed by: FAMILY MEDICINE

## 2021-09-16 RX ORDER — DICLOFENAC SODIUM 75 MG/1
75 TABLET, DELAYED RELEASE ORAL 2 TIMES DAILY
Qty: 60 TABLET | Refills: 1 | Status: SHIPPED | OUTPATIENT
Start: 2021-09-16 | End: 2022-10-25

## 2021-09-16 RX ORDER — CYCLOBENZAPRINE HCL 10 MG
10 TABLET ORAL 3 TIMES DAILY PRN
Qty: 30 TABLET | Refills: 1 | Status: SHIPPED | OUTPATIENT
Start: 2021-09-16

## 2021-09-16 RX ORDER — ZOLPIDEM TARTRATE 10 MG/1
10 TABLET ORAL NIGHTLY PRN
Qty: 15 TABLET | Refills: 0 | Status: SHIPPED | OUTPATIENT
Start: 2021-09-16 | End: 2021-10-16

## 2021-09-16 RX ORDER — ZOLPIDEM TARTRATE 10 MG/1
10 TABLET ORAL NIGHTLY PRN
COMMUNITY
End: 2022-09-21 | Stop reason: SDUPTHER

## 2021-09-16 RX ORDER — CELECOXIB 200 MG/1
200 CAPSULE ORAL 2 TIMES DAILY
COMMUNITY
End: 2022-05-19 | Stop reason: SDUPTHER

## 2021-09-16 ASSESSMENT — PATIENT HEALTH QUESTIONNAIRE - PHQ9: CLINICAL INTERPRETATION OF PHQ2 SCORE: 0

## 2021-09-16 ASSESSMENT — PAIN SCALES - GENERAL: PAINLEVEL: 1=MINIMAL PAIN

## 2021-09-16 NOTE — PROGRESS NOTES
Chief Complaint   Patient presents with   • Annual Exam       HPI:  46 y.o. female for routine well exam.     Hypothyroidism-stable.  She was previously seen by Dr. Zuniga.   Otherwise been stable on levothyroxine 125 mcg daily.  Recent lab work completed.    Vitamin B12 deficiency-recent labs with elevated levels.  States she found that her new bottle has double the dose.    Intermittent insomnia-Ambien 10 mg, does go camping in her trailer frequently.  Occasionally use 1/3 tablet as needed.  Last prescription a year over a year ago for 15 tablets.  Refill medication.    Has intermittent back issues for which she may use Celebrex, Voltaren, Flexeril or Medrol Dosepak as needed flareups.  Was prescribed Flexeril in the past from the ER which was helpful.  Needs refill medication for Flexeril and Voltaren.  Doing routine exercises is helpful.  Currently without significant issues.    Lifestyle:  Diet: worse in summer, but back on track. Well rounded. Intermittent fasting.   Exercise/Activities: works out at gym, yoga.   Stressors: overall good low  Social Support:  10 yo and 8 yo  Work:       Health Maintenance:  Last pap: 2018, negative and HPV negative  Immunizations: Covid-19 vaccine completed.  Mammogram: feb fv imaging scheduled.   Colonoscopy: reviewed future recommendations.       Health Maintenance Summary                COVID-19 Vaccine Overdue 5/28/1989     PAP SMEAR Overdue 4/2/2021      Done 4/2/2018 THINPREP PAP WITH HPV     Patient has more history with this topic...    IMM INFLUENZA Overdue 9/1/2021      Done 10/23/2020 Ext Imm: Influenza Quad Inj P     Patient has more history with this topic...    MAMMOGRAM Next Due 7/21/2022      Done 7/21/2021 MA-SCREENING MAMMO BILAT W/TOMOSYNTHESIS W/CAD     Patient has more history with this topic...    IMM DTaP/Tdap/Td Vaccine Next Due 8/28/2028      Done 8/28/2018 Imm Admin: TD Vaccine     Patient has more history with this topic...           Immunization History   Administered Date(s) Administered   • DTP - Historical vaccine 07/23/1982   • Hepatitis A Vaccine, Adult 04/28/1998, 09/12/2000   • Hepatitis B Vaccine Adolescent/Pediatric 04/28/1998, 06/01/1998, 09/12/2000   • Influenza (IM) Preservative Free - HISTORICAL DATA 11/21/2019   • Influenza Seasonal Injectable - Historical Data 11/04/2015, 10/10/2016   • Influenza Vaccine Quad Inj (Pf) 11/15/2018   • MMR Vaccine 01/24/1994   • Meningococcal Conjugate Vaccine MCV4 (MENVEO) 01/08/2001   • OPV - HISTORICAL DATA 07/23/1982   • TD Vaccine 04/28/1998, 08/28/2018   • Tdap Vaccine 05/12/2008       Review of Systems:  Constitutional: Negative for fever, chills and malaise/fatigue.   HENT: Negative for congestion, sore throat, or swallowing issues.   Eyes: Negative for pain or vision changes.   Respiratory: Negative for cough and shortness of breath.    Cardiovascular: Negative for leg swelling. No chest pain.   Gastrointestinal: Negative for nausea, vomiting, abdominal pain and diarrhea.   Genitourinary: Negative for dysuria and hematuria.   Skin: Negative for rash.   Neurological: Negative for dizziness, focal weakness and headaches.   Endo/Heme/Allergies: Does not bruise/bleed easily.   Psychiatric/Behavioral: Negative for depression.  The patient is not nervous/anxious.          Current Outpatient Medications:   •  celecoxib (CELEBREX) 200 MG Cap, Take 200 mg by mouth 2 times a day., Disp: , Rfl:   •  zolpidem (AMBIEN) 10 MG Tab, Take 10 mg by mouth at bedtime as needed for Sleep., Disp: , Rfl:   •  zolpidem (AMBIEN) 10 MG Tab, Take 1 Tablet by mouth at bedtime as needed for Sleep for up to 30 days., Disp: 15 Tablet, Rfl: 0  •  diclofenac DR (VOLTAREN) 75 MG Tablet Delayed Response, Take 1 Tablet by mouth 2 times a day., Disp: 60 Tablet, Rfl: 1  •  cyclobenzaprine (FLEXERIL) 10 mg Tab, Take 1 Tablet by mouth 3 times a day as needed., Disp: 30 Tablet, Rfl: 1  •  levothyroxine (SYNTHROID) 125 MCG  "Tab, TAKE 1 TABLET EVERY MORNING ON AN EMPTY STOMACH, Disp: 90 Tab, Rfl: 3  •  methylPREDNISolone (MEDROL DOSEPAK) 4 MG Tablet Therapy Pack, As directed on the packaging label., Disp: 21 Tab, Rfl: 0  •  Cholecalciferol (VITAMIN D) 2000 UNIT Tab, Take  by mouth every day., Disp: , Rfl:     No Known Allergies    Patient Active Problem List   Diagnosis   • Weight gain   • IUD (intrauterine device) in place   • Psoriasis   • Episodic low back pain   • Leukocytosis   • Seasonal allergic rhinitis   • Hypothyroidism   • Family history of ovarian cancer   • Bulging of thoracic intervertebral disc   • DDD (degenerative disc disease), lumbar   • Breast pain, right   • Breast cyst, right   • Abscess of right breast   • Breast mass, right       Past Medical History:   Diagnosis Date   • Allergy     Seasonal   • Bulging of thoracic intervertebral disc    • Hypothyroidism        History reviewed. No pertinent surgical history.    Family History   Problem Relation Age of Onset   • Cancer Mother 65        ovarian   • Heart Disease Mother    • Stroke Mother         a fib   • Hypertension Mother    • Cancer Father         lung, skin- precancerous   • Lung Disease Father    • Hypertension Brother        Social History     Tobacco Use   • Smoking status: Never Smoker   • Smokeless tobacco: Never Used   Vaping Use   • Vaping Use: Never used   Substance Use Topics   • Alcohol use: Yes     Comment: occasionally    • Drug use: No       PHYSICAL EXAM:  /64   Pulse 74   Temp 36.6 °C (97.9 °F)   Resp 12   Ht 1.676 m (5' 6\")   Wt 77.6 kg (171 lb)   SpO2 96%   BMI 27.60 kg/m²   Constitutional: She appears well-developed and well-nourished. She appears not diaphoretic. No distress.   HENT: Right Ear: External ear normal. Left Ear: External ear normal. Tympanic membranes clear and intact.   Nose: Nose normal.   Mouth/Throat: Oropharynx is clear and moist. No oropharyngeal exudate.     Eyes: Conjunctivae and extraocular motions are " normal. Pupils are equal, round, and reactive to light. No scleral icterus.   Neck: Normal range of motion. Neck supple. No thyromegaly present.   Cardiovascular: Normal rate, regular rhythm, normal heart sounds and intact distal pulses.  Exam reveals no gallop and no friction rub.  No murmur heard. No carotid bruits.   Pulmonary/Chest: Effort normal and breath sounds normal. No respiratory distress. She has no wheezes. She has no rales.   Abdominal: Soft. Bowel sounds are normal. She exhibits no distension and no mass. No tenderness. She has no rebound and no guarding.   Lymphadenopathy:  She has no cervical adenopathy.   Neurological: She is alert. She has normal reflexes. No cranial nerve deficit. She exhibits normal muscle tone.   Skin: Skin is warm and dry. No rash noted. She is not diaphoretic. No erythema.   Psychiatric: She has a normal mood and affect. Her behavior is normal.   Musculoskeletal: She exhibits no edema. Full strength throughout. 2+ DTR throughout.         ASSESSMENT/PLAN:    This is a 46 y.o. female for well exam.    1. Well adult exam     2. Hypothyroidism, unspecified type  TSH WITH REFLEX TO FT4   3. Vitamin B12 deficiency  VITAMIN B12   4. Transient insomnia  zolpidem (AMBIEN) 10 MG Tab   5. Episodic low back pain  diclofenac DR (VOLTAREN) 75 MG Tablet Delayed Response    cyclobenzaprine (FLEXERIL) 10 mg Tab   6. Screening for diabetes mellitus  Comp Metabolic Panel   7. Screening, lipid  Lipid Profile   -Well adult exam-recommend healthy diet and routine exercise activities.  -Hypothyroidism-stable.  Continue on levothyroxine 125 mcg daily.  She will request refill from our office in future and to see endocrinology in future as needed.  -Vitamin B12 deficiency-recently levels elevated on lab work.  Patient to reduce dose.  Monitor in future.  -Transient insomnia-stable.  Continue current medication as needed.  Reviewed potential side effects of medication therapy.  -Episodic low back  pain-medication therapy as needed.  Reviewed potential side effects of medication.  Patient aware to avoid using sedating medications as well as nsaids together.  Recommend continue routine conditioning, stretching and strengthening exercises.    Return in about 1 year (around 9/16/2022).   Follow-up sooner as needed after lab work is complete.      This medical record contains text that has been entered with the assistance of computer voice recognition and dictation software.  Therefore, it may contain unintended errors in text, spelling, punctuation, or grammar.

## 2021-12-01 ENCOUNTER — APPOINTMENT (RX ONLY)
Dept: URBAN - METROPOLITAN AREA CLINIC 35 | Facility: CLINIC | Age: 46
Setting detail: DERMATOLOGY
End: 2021-12-01

## 2021-12-01 DIAGNOSIS — Z71.89 OTHER SPECIFIED COUNSELING: ICD-10-CM

## 2021-12-01 DIAGNOSIS — D485 NEOPLASM OF UNCERTAIN BEHAVIOR OF SKIN: ICD-10-CM

## 2021-12-01 DIAGNOSIS — L81.4 OTHER MELANIN HYPERPIGMENTATION: ICD-10-CM

## 2021-12-01 DIAGNOSIS — L82.1 OTHER SEBORRHEIC KERATOSIS: ICD-10-CM

## 2021-12-01 DIAGNOSIS — D22 MELANOCYTIC NEVI: ICD-10-CM

## 2021-12-01 DIAGNOSIS — L71.8 OTHER ROSACEA: ICD-10-CM | Status: INADEQUATELY CONTROLLED

## 2021-12-01 PROBLEM — D22.5 MELANOCYTIC NEVI OF TRUNK: Status: ACTIVE | Noted: 2021-12-01

## 2021-12-01 PROBLEM — D48.5 NEOPLASM OF UNCERTAIN BEHAVIOR OF SKIN: Status: ACTIVE | Noted: 2021-12-01

## 2021-12-01 PROBLEM — D22.39 MELANOCYTIC NEVI OF OTHER PARTS OF FACE: Status: ACTIVE | Noted: 2021-12-01

## 2021-12-01 PROCEDURE — 99213 OFFICE O/P EST LOW 20 MIN: CPT

## 2021-12-01 PROCEDURE — ? ADDITIONAL NOTES

## 2021-12-01 PROCEDURE — ? SUNSCREEN RECOMMENDATIONS

## 2021-12-01 PROCEDURE — ? COUNSELING

## 2021-12-01 PROCEDURE — ? TREATMENT REGIMEN

## 2021-12-01 ASSESSMENT — LOCATION DETAILED DESCRIPTION DERM
LOCATION DETAILED: LEFT MEDIAL MALAR CHEEK
LOCATION DETAILED: LEFT MID-UPPER BACK
LOCATION DETAILED: PERIUNGUAL SKIN LEFT THUMB
LOCATION DETAILED: RIGHT MEDIAL SUPERIOR CHEST
LOCATION DETAILED: LEFT INFERIOR CENTRAL MALAR CHEEK
LOCATION DETAILED: LEFT SUPERIOR UPPER BACK

## 2021-12-01 ASSESSMENT — LOCATION SIMPLE DESCRIPTION DERM
LOCATION SIMPLE: CHEST
LOCATION SIMPLE: LEFT UPPER BACK
LOCATION SIMPLE: LEFT CHEEK
LOCATION SIMPLE: LEFT THUMB

## 2021-12-01 ASSESSMENT — LOCATION ZONE DERM
LOCATION ZONE: FACE
LOCATION ZONE: TRUNK
LOCATION ZONE: FINGER

## 2021-12-01 NOTE — PROCEDURE: ADDITIONAL NOTES
Render Risk Assessment In Note?: yes
Detail Level: Zone
Additional Notes: Consider a X-ray prior to treatment options, not bothersome to patient at this time

## 2021-12-01 NOTE — PROCEDURE: TREATMENT REGIMEN
Continue Regimen: Soolantra 1 % topical cream Apply to facial skin once daily\\n\\nMetronidazole 0.75% lotion
Otc Regimen: Ocusoft
Detail Level: Simple

## 2022-02-09 ENCOUNTER — APPOINTMENT (RX ONLY)
Dept: URBAN - METROPOLITAN AREA CLINIC 35 | Facility: CLINIC | Age: 47
Setting detail: DERMATOLOGY
End: 2022-02-09

## 2022-02-09 DIAGNOSIS — E03.9 HYPOTHYROIDISM, UNSPECIFIED TYPE: ICD-10-CM

## 2022-02-09 RX ORDER — LEVOTHYROXINE SODIUM 0.12 MG/1
125 TABLET ORAL
Qty: 90 TABLET | Refills: 3 | Status: SHIPPED | OUTPATIENT
Start: 2022-02-09 | End: 2023-03-01

## 2022-02-10 ENCOUNTER — RX ONLY (OUTPATIENT)
Age: 47
Setting detail: RX ONLY
End: 2022-02-10

## 2022-02-10 RX ORDER — METRONIDAZOLE 7.5 MG/ML
LOTION TOPICAL
Qty: 177 | Refills: 3 | Status: ERX

## 2022-02-28 ENCOUNTER — HOSPITAL ENCOUNTER (OUTPATIENT)
Dept: RADIOLOGY | Facility: MEDICAL CENTER | Age: 47
End: 2022-02-28
Attending: FAMILY MEDICINE
Payer: OTHER GOVERNMENT

## 2022-02-28 DIAGNOSIS — R92.8 ABNORMAL MAMMOGRAM: ICD-10-CM

## 2022-02-28 PROCEDURE — G0279 TOMOSYNTHESIS, MAMMO: HCPCS | Mod: RT

## 2022-10-25 ENCOUNTER — HOSPITAL ENCOUNTER (OUTPATIENT)
Facility: MEDICAL CENTER | Age: 47
End: 2022-10-25
Attending: OBSTETRICS & GYNECOLOGY
Payer: OTHER GOVERNMENT

## 2022-10-25 ENCOUNTER — GYNECOLOGY VISIT (OUTPATIENT)
Dept: OBGYN | Facility: CLINIC | Age: 47
End: 2022-10-25
Payer: OTHER GOVERNMENT

## 2022-10-25 VITALS
HEIGHT: 66 IN | DIASTOLIC BLOOD PRESSURE: 75 MMHG | BODY MASS INDEX: 28.32 KG/M2 | SYSTOLIC BLOOD PRESSURE: 109 MMHG | WEIGHT: 176.2 LBS

## 2022-10-25 DIAGNOSIS — N64.4 MASTALGIA: ICD-10-CM

## 2022-10-25 DIAGNOSIS — Z12.4 SCREENING FOR MALIGNANT NEOPLASM OF CERVIX: ICD-10-CM

## 2022-10-25 DIAGNOSIS — Z01.419 WOMEN'S ANNUAL ROUTINE GYNECOLOGICAL EXAMINATION: Primary | ICD-10-CM

## 2022-10-25 PROBLEM — N61.1 ABSCESS OF RIGHT BREAST: Status: RESOLVED | Noted: 2018-03-16 | Resolved: 2022-10-25

## 2022-10-25 PROBLEM — N60.01 BREAST CYST, RIGHT: Status: RESOLVED | Noted: 2018-03-16 | Resolved: 2022-10-25

## 2022-10-25 PROBLEM — N63.10 BREAST MASS, RIGHT: Status: RESOLVED | Noted: 2018-03-23 | Resolved: 2022-10-25

## 2022-10-25 PROCEDURE — 99396 PREV VISIT EST AGE 40-64: CPT | Performed by: OBSTETRICS & GYNECOLOGY

## 2022-10-25 PROCEDURE — 88175 CYTOPATH C/V AUTO FLUID REDO: CPT

## 2022-10-25 PROCEDURE — 87624 HPV HI-RISK TYP POOLED RSLT: CPT

## 2022-10-25 NOTE — PROGRESS NOTES
"ANNUAL GYNECOLOGY VISIT    Chief Complaint  Gynecologic Exam      Subjective  Jovita Rosa is a 47 y.o. female who presents today for Annual Exam.  She complains of bilateral breast fullness and tenderness for the last few weeks that seems abnormal.  States she just had a normal mammogram \"about 2 months ago.\"  Does have history of benign breast biopsy on the right.      Preventive Care   Immunization History   Administered Date(s) Administered    DTP - Historical vaccine 1982    Hepatitis A Vaccine, Adult 1998, 2000    Hepatitis B Vaccine Adolescent/Pediatric 1998, 1998, 2000    Influenza (IM) Preservative Free - HISTORICAL DATA 2019    Influenza Seasonal Injectable - Historical Data 2015, 10/10/2016    Influenza Vaccine Quad Inj (Pf) 11/15/2018    MMR Vaccine 1994    Meningococcal Conjugate Vaccine MCV4 (MENVEO) 2001    OPV TRIVALENT - HISTORICAL DATA (GIVEN PRIOR TO MAY 2016) 1982    TD Vaccine 1998, 2018    Tdap Vaccine 2008     Last Mammogram: upon chart review, last routine screening mammo was 2021 followed by unilateral follow up after right breast biopsy.    Gynecology History and ROS  Current Sexual Activity: Yes  History of sexually transmitted diseases?  no  Abnormal discharge? No    Menstrual History  No LMP recorded. Patient has had an implant.  Periods are absent secondary to Mirean.       Pap History  Last pap smear:   History of moderate or severe dysplasia: No    Cancer Risk Assessement:  Family history of:   - Breast cancer: no   - Ovarian cancer: mother   - Uterine cancer: no   - Colon cancer: no    Obstetric History  OB History    Para Term  AB Living   2 2 2     2   SAB IAB Ectopic Molar Multiple Live Births             2      # Outcome Date GA Lbr Horacio/2nd Weight Sex Delivery Anes PTL Lv   2 Term 14 42w0d  7 lb M Vag-Spont   YUE   1 Term 12 42w0d  6 lb 6 oz F Vag-Spont  N " YUE       Past Medical History  Past Medical History:   Diagnosis Date    Abscess of right breast 3/16/2018    Allergy     Seasonal    Bulging of thoracic intervertebral disc     Hypothyroidism        Past Surgical History  History reviewed. No pertinent surgical history.    Social History  Social History     Socioeconomic History    Marital status:      Spouse name: Not on file    Number of children: Not on file    Years of education: Not on file    Highest education level: Not on file   Occupational History    Occupation:    Tobacco Use    Smoking status: Never    Smokeless tobacco: Never   Vaping Use    Vaping Use: Never used   Substance and Sexual Activity    Alcohol use: Yes     Comment: occasionally     Drug use: No    Sexual activity: Yes     Partners: Male     Birth control/protection: I.U.D.   Other Topics Concern    Not on file   Social History Narrative    , 2 children.      Social Determinants of Health     Financial Resource Strain: Not on file   Food Insecurity: Not on file   Transportation Needs: Not on file   Physical Activity: Not on file   Stress: Not on file   Social Connections: Not on file   Intimate Partner Violence: Not on file   Housing Stability: Not on file       Family History  Family History   Problem Relation Age of Onset    Cancer Mother 65        ovarian    Heart Disease Mother     Stroke Mother         a fib    Hypertension Mother     Cancer Father         lung, skin- precancerous    Lung Disease Father     Hypertension Brother        Home Medications  Current Outpatient Medications on File Prior to Visit   Medication Sig Dispense Refill    zolpidem (AMBIEN) 10 MG Tab Take 1 Tablet by mouth at bedtime as needed for Sleep for up to 90 days. 30 Tablet 0    celecoxib (CELEBREX) 200 MG Cap Take 1 Capsule by mouth every day. 90 Capsule 0    levothyroxine (SYNTHROID) 125 MCG Tab Take 1 Tablet by mouth every morning on an empty stomach. 90 Tablet 3    cyclobenzaprine  "(FLEXERIL) 10 mg Tab Take 1 Tablet by mouth 3 times a day as needed. 30 Tablet 1    methylPREDNISolone (MEDROL DOSEPAK) 4 MG Tablet Therapy Pack As directed on the packaging label. 21 Tab 0    Cholecalciferol (VITAMIN D) 2000 UNIT Tab Take  by mouth every day.       No current facility-administered medications on file prior to visit.       Allergies/Reactions  No Known Allergies    ROS  Positive ROS: breast pain  Gen: no fevers or chills, no significant weight loss or gain, excessive fatigue  Respiratory:  no cough or dyspnea  Cardiac:  no chest pain, no palpitations, no syncope  Breast: no breast discharge, pain, lump or skin changes  GI:  no heartburn, no abdominal pain, no nausea or vomiting  Urinary: no dysuria, urgency, frequency, incontinence   Psych: no depression or anxiety  Neuro: no migraines with aura, fainting spells, numbness or tingling  Extremities: no joint pain, persistently swollen ankles, recurrent leg cramps      Physical Examination:  Vital Signs:   Vitals:    10/25/22 1108   BP: 109/75   BP Location: Right arm   Patient Position: Sitting   BP Cuff Size: Adult   Weight: 176 lb 3.2 oz   Height: 5' 6\"     Body mass index is 28.44 kg/m².    Constitutional: The patient is well developed and well nourished.  Psychiatric: Patient is oriented to time place and person.   Skin: No rash observed.  Neck: Appears symmetric. Thyroid normal size  Respiratory: normal effort  Breast: Inspection reveals no asymetry or nipple discharge, no skin thickening, dimpling or erythema.  Palpation demonstrates no masses.  Pt elicits tenderness bilaterally  Abdomen: Soft, non-tender.  Pelvic Exam:     Vulva: external female genitalia are normal in appearance. No lesions    Urethra - no lesions, no erythema    Vagina: moist, pink, normal ruggae    Cervix: pink, smooth, no lesions, no CMT    Uterus - non-tender, normal size, shape, contour    Ovaries: non-tender, no appreciable masses  Pap Smear performed: Yes  Extremeties: " Legs are symmetric and without tenderness. There is no edema present.    Labs/Imaging:  HDL (mg/dL)   Date Value   02/07/2017 57     LDL (mg/dL)   Date Value   02/07/2017 92     No components found for: A1C1  WBC (K/uL)   Date Value   11/25/2020 8.9     Lab Results   Component Value Date/Time    CO2 25 02/07/2017 0649    BUN 15 02/07/2017 0649     [unfilled]      Assessment & Plan  Jovita Rosa is a 47 y.o. female who presents today for Annual Gyn Exam.     1. Women's annual routine gynecological examination  PAP with cotest done  MAMMOGRAM: pt initially indicated that mammo was recent.  I am unsure if this was in another center.  eOriginal message sent for clarification  COUNSELING: breast self exam  FLU SHOT recommended during flu season    2. Screening for malignant neoplasm of cervix  - THINPREP PAP WITH HPV; Future    3. Mastalgia   - given review of chart of mammo (bilateral) > 1 yr ago and bilateral breast pain, will go ahead and order dx mammo.  vidCoint message sent to clarify if pt had mammo elsewhere, otherwise she can proceed with scheduling for evaluation    4. Family history of Ovarian cancer   - consider BRCA screening if not previously done      Return: Annually or PRN    Arielle Miles D.O.

## 2022-11-09 ENCOUNTER — HOSPITAL ENCOUNTER (OUTPATIENT)
Dept: RADIOLOGY | Facility: MEDICAL CENTER | Age: 47
End: 2022-11-09
Attending: OBSTETRICS & GYNECOLOGY
Payer: OTHER GOVERNMENT

## 2022-11-09 DIAGNOSIS — N64.4 MASTALGIA: ICD-10-CM

## 2022-11-09 PROCEDURE — 76642 ULTRASOUND BREAST LIMITED: CPT | Mod: RT

## 2022-11-09 PROCEDURE — G0279 TOMOSYNTHESIS, MAMMO: HCPCS

## 2022-12-20 ENCOUNTER — HOSPITAL ENCOUNTER (OUTPATIENT)
Dept: LAB | Facility: MEDICAL CENTER | Age: 47
End: 2022-12-20
Attending: FAMILY MEDICINE
Payer: OTHER GOVERNMENT

## 2022-12-20 DIAGNOSIS — Z13.1 SCREENING FOR DIABETES MELLITUS: ICD-10-CM

## 2022-12-20 DIAGNOSIS — Z13.21 ENCOUNTER FOR VITAMIN DEFICIENCY SCREENING: ICD-10-CM

## 2022-12-20 DIAGNOSIS — E53.8 VITAMIN B12 DEFICIENCY: ICD-10-CM

## 2022-12-20 DIAGNOSIS — Z13.0 SCREENING FOR DEFICIENCY ANEMIA: ICD-10-CM

## 2022-12-20 DIAGNOSIS — Z00.00 WELL ADULT EXAM: ICD-10-CM

## 2022-12-20 DIAGNOSIS — E03.9 HYPOTHYROIDISM, UNSPECIFIED TYPE: ICD-10-CM

## 2022-12-20 LAB
25(OH)D3 SERPL-MCNC: 31 NG/ML (ref 30–100)
ALBUMIN SERPL BCP-MCNC: 4.5 G/DL (ref 3.2–4.9)
ALBUMIN/GLOB SERPL: 1.7 G/DL
ALP SERPL-CCNC: 70 U/L (ref 30–99)
ALT SERPL-CCNC: 10 U/L (ref 2–50)
ANION GAP SERPL CALC-SCNC: 12 MMOL/L (ref 7–16)
AST SERPL-CCNC: 18 U/L (ref 12–45)
BASOPHILS # BLD AUTO: 0.9 % (ref 0–1.8)
BASOPHILS # BLD: 0.06 K/UL (ref 0–0.12)
BILIRUB SERPL-MCNC: 0.5 MG/DL (ref 0.1–1.5)
BUN SERPL-MCNC: 16 MG/DL (ref 8–22)
CALCIUM ALBUM COR SERPL-MCNC: 9 MG/DL (ref 8.5–10.5)
CALCIUM SERPL-MCNC: 9.4 MG/DL (ref 8.5–10.5)
CHLORIDE SERPL-SCNC: 103 MMOL/L (ref 96–112)
CHOLEST SERPL-MCNC: 165 MG/DL (ref 100–199)
CO2 SERPL-SCNC: 25 MMOL/L (ref 20–33)
CREAT SERPL-MCNC: 0.96 MG/DL (ref 0.5–1.4)
EOSINOPHIL # BLD AUTO: 0.21 K/UL (ref 0–0.51)
EOSINOPHIL NFR BLD: 3 % (ref 0–6.9)
ERYTHROCYTE [DISTWIDTH] IN BLOOD BY AUTOMATED COUNT: 40.2 FL (ref 35.9–50)
EST. AVERAGE GLUCOSE BLD GHB EST-MCNC: 105 MG/DL
FASTING STATUS PATIENT QL REPORTED: NORMAL
GFR SERPLBLD CREATININE-BSD FMLA CKD-EPI: 74 ML/MIN/1.73 M 2
GLOBULIN SER CALC-MCNC: 2.6 G/DL (ref 1.9–3.5)
GLUCOSE SERPL-MCNC: 73 MG/DL (ref 65–99)
HBA1C MFR BLD: 5.3 % (ref 4–5.6)
HCT VFR BLD AUTO: 41.6 % (ref 37–47)
HDLC SERPL-MCNC: 59 MG/DL
HGB BLD-MCNC: 14.1 G/DL (ref 12–16)
IMM GRANULOCYTES # BLD AUTO: 0.03 K/UL (ref 0–0.11)
IMM GRANULOCYTES NFR BLD AUTO: 0.4 % (ref 0–0.9)
LDLC SERPL CALC-MCNC: 96 MG/DL
LYMPHOCYTES # BLD AUTO: 2.96 K/UL (ref 1–4.8)
LYMPHOCYTES NFR BLD: 42.4 % (ref 22–41)
MCH RBC QN AUTO: 30.4 PG (ref 27–33)
MCHC RBC AUTO-ENTMCNC: 33.9 G/DL (ref 33.6–35)
MCV RBC AUTO: 89.7 FL (ref 81.4–97.8)
MONOCYTES # BLD AUTO: 0.7 K/UL (ref 0–0.85)
MONOCYTES NFR BLD AUTO: 10 % (ref 0–13.4)
NEUTROPHILS # BLD AUTO: 3.02 K/UL (ref 2–7.15)
NEUTROPHILS NFR BLD: 43.3 % (ref 44–72)
NRBC # BLD AUTO: 0 K/UL
NRBC BLD-RTO: 0 /100 WBC
PLATELET # BLD AUTO: 333 K/UL (ref 164–446)
PMV BLD AUTO: 9.3 FL (ref 9–12.9)
POTASSIUM SERPL-SCNC: 3.8 MMOL/L (ref 3.6–5.5)
PROT SERPL-MCNC: 7.1 G/DL (ref 6–8.2)
RBC # BLD AUTO: 4.64 M/UL (ref 4.2–5.4)
SODIUM SERPL-SCNC: 140 MMOL/L (ref 135–145)
TRIGL SERPL-MCNC: 52 MG/DL (ref 0–149)
TSH SERPL DL<=0.005 MIU/L-ACNC: 5.09 UIU/ML (ref 0.38–5.33)
VIT B12 SERPL-MCNC: 776 PG/ML (ref 211–911)
WBC # BLD AUTO: 7 K/UL (ref 4.8–10.8)

## 2022-12-20 PROCEDURE — 82306 VITAMIN D 25 HYDROXY: CPT

## 2022-12-20 PROCEDURE — 36415 COLL VENOUS BLD VENIPUNCTURE: CPT

## 2022-12-20 PROCEDURE — 80053 COMPREHEN METABOLIC PANEL: CPT

## 2022-12-20 PROCEDURE — 80061 LIPID PANEL: CPT

## 2022-12-20 PROCEDURE — 83036 HEMOGLOBIN GLYCOSYLATED A1C: CPT

## 2022-12-20 PROCEDURE — 82607 VITAMIN B-12: CPT

## 2022-12-20 PROCEDURE — 85025 COMPLETE CBC W/AUTO DIFF WBC: CPT

## 2022-12-20 PROCEDURE — 84443 ASSAY THYROID STIM HORMONE: CPT

## 2022-12-23 ENCOUNTER — OFFICE VISIT (OUTPATIENT)
Dept: MEDICAL GROUP | Facility: IMAGING CENTER | Age: 47
End: 2022-12-23
Payer: OTHER GOVERNMENT

## 2022-12-23 VITALS
SYSTOLIC BLOOD PRESSURE: 100 MMHG | HEART RATE: 57 BPM | WEIGHT: 175.6 LBS | BODY MASS INDEX: 28.22 KG/M2 | RESPIRATION RATE: 16 BRPM | OXYGEN SATURATION: 99 % | TEMPERATURE: 97.9 F | HEIGHT: 66 IN | DIASTOLIC BLOOD PRESSURE: 70 MMHG

## 2022-12-23 DIAGNOSIS — Z00.00 WELL ADULT EXAM: ICD-10-CM

## 2022-12-23 DIAGNOSIS — Z12.11 SCREEN FOR COLON CANCER: ICD-10-CM

## 2022-12-23 DIAGNOSIS — E03.9 HYPOTHYROIDISM, UNSPECIFIED TYPE: ICD-10-CM

## 2022-12-23 DIAGNOSIS — Z11.59 NEED FOR HEPATITIS C SCREENING TEST: ICD-10-CM

## 2022-12-23 PROCEDURE — 99396 PREV VISIT EST AGE 40-64: CPT | Performed by: FAMILY MEDICINE

## 2022-12-23 ASSESSMENT — FIBROSIS 4 INDEX: FIB4 SCORE: 0.77

## 2022-12-23 ASSESSMENT — PATIENT HEALTH QUESTIONNAIRE - PHQ9: CLINICAL INTERPRETATION OF PHQ2 SCORE: 0

## 2022-12-23 ASSESSMENT — PAIN SCALES - GENERAL: PAINLEVEL: NO PAIN

## 2022-12-23 NOTE — PROGRESS NOTES
Chief Complaint   Patient presents with    Annual Exam       HPI:  47 y.o. female for well exam.   Hypothyroidism- on levothyroxine 125 mcg daily, previously more on regimen of taking medication at certain time prior to food intake, which she will plan to restart.   Over past few years, loss of lateral part of eyebrow. No other significant fatigue, hair changes or weight changes.   Vitamin D-  lower end of normal. Recommend add 1000 IU daily.   Mother and brother with a fib. Ekg done per flight doc recently, has copy today.   BP runs on lower end. No dizziness.     Lifestyle:  Diet: healthy  Exercise/Activities: regularly  Stressors: stable.   Social Support: good.   Work:     Health Maintenance:  Last pap: up to date  Immunizations:has had prior vaccinations completed.   Mammogram: 11/2022-dense tissue, per gynecology  Colonoscopy: due    Health Maintenance Due   Topic Date Due    IMM HEP B VACCINE (1 of 3 - 3-dose series) 1975    HEPATITIS C SCREENING  Never done    COVID-19 Vaccine (1) Never done    COLORECTAL CANCER SCREENING  Never done       Immunization History   Administered Date(s) Administered    DTP - Historical vaccine 07/23/1982    Hepatitis A Vaccine, Adult 04/28/1998, 09/12/2000    Hepatitis B Vaccine Adolescent/Pediatric 04/28/1998, 06/01/1998, 09/12/2000    Influenza (IM) Preservative Free - HISTORICAL DATA 11/21/2019    Influenza Seasonal Injectable - Historical Data 11/04/2015, 10/10/2016    Influenza Vaccine Quad Inj (Pf) 11/15/2018    MMR Vaccine 01/24/1994    Meningococcal Conjugate Vaccine MCV4 (MENVEO) 01/08/2001    OPV TRIVALENT - HISTORICAL DATA (GIVEN PRIOR TO MAY 2016) 07/23/1982    TD Vaccine 04/28/1998, 08/28/2018    Tdap Vaccine 05/12/2008         Review of Systems:  Constitutional: Negative for fever, chills and malaise/fatigue.   HENT: Negative for congestion, sore throat, or swallowing issues.   Eyes: Negative for pain or vision changes.   Respiratory: Negative for  cough and shortness of breath.    Cardiovascular: Negative for leg swelling. No chest pain.   Gastrointestinal: Negative for nausea, vomiting, abdominal pain and diarrhea.   Genitourinary: Negative for dysuria and hematuria.   Skin: Negative for rash.   Neurological: Negative for dizziness, focal weakness and headaches.   Endo/Heme/Allergies: Does not bruise/bleed easily.   Psychiatric/Behavioral: Negative for depression.  The patient is not nervous/anxious.        Current Outpatient Medications:     celecoxib (CELEBREX) 200 MG Cap, Take 1 Capsule by mouth every day., Disp: 90 Capsule, Rfl: 0    levothyroxine (SYNTHROID) 125 MCG Tab, Take 1 Tablet by mouth every morning on an empty stomach., Disp: 90 Tablet, Rfl: 3    cyclobenzaprine (FLEXERIL) 10 mg Tab, Take 1 Tablet by mouth 3 times a day as needed., Disp: 30 Tablet, Rfl: 1    methylPREDNISolone (MEDROL DOSEPAK) 4 MG Tablet Therapy Pack, As directed on the packaging label., Disp: 21 Tab, Rfl: 0    Cholecalciferol (VITAMIN D) 2000 UNIT Tab, Take  by mouth every day., Disp: , Rfl:     No Known Allergies    Patient Active Problem List   Diagnosis    Weight gain    IUD (intrauterine device) in place    Psoriasis    Episodic low back pain    Leukocytosis    Seasonal allergic rhinitis    Hypothyroidism    Family history of ovarian cancer    Bulging of thoracic intervertebral disc    DDD (degenerative disc disease), lumbar       Past Medical History:   Diagnosis Date    Abscess of right breast 3/16/2018    Allergy     Seasonal    Bulging of thoracic intervertebral disc     Hypothyroidism        History reviewed. No pertinent surgical history.    Family History   Problem Relation Age of Onset    Ovarian Cancer Mother 65    Heart Disease Mother     Stroke Mother         a fib    Hypertension Mother     Cancer Father         lung, skin- precancerous    Lung Disease Father     Hypertension Brother        Social History     Tobacco Use    Smoking status: Never    Smokeless  "tobacco: Never   Vaping Use    Vaping Use: Never used   Substance Use Topics    Alcohol use: Yes     Comment: occasionally     Drug use: No         PHYSICAL EXAM:  /70   Pulse (!) 57   Temp 36.6 °C (97.9 °F) (Temporal)   Resp 16   Ht 1.676 m (5' 6\")   Wt 79.7 kg (175 lb 9.6 oz)   LMP  (LMP Unknown) Comment: Mirena IUD  SpO2 99%   BMI 28.34 kg/m²   Constitutional: She appears well-developed and well-nourished. She appears not diaphoretic. No distress.   HENT: Right Ear: External ear normal. Left Ear: External ear normal. Tympanic membranes clear and intact.   Nose: Nose normal.   Mouth/Throat: Oropharynx is clear and moist. No oropharyngeal exudate.     Eyes: Conjunctivae and extraocular motions are normal. Pupils are equal, round, and reactive to light. No scleral icterus.   Neck: Normal range of motion. Neck supple. No thyromegaly present.   Cardiovascular: Normal rate, regular rhythm, normal heart sounds and intact distal pulses.  Exam reveals no gallop and no friction rub.  No murmur heard. No carotid bruits.   Pulmonary/Chest: Effort normal and breath sounds normal. No respiratory distress. She has no wheezes. She has no rales.   Abdominal: Soft. Bowel sounds are normal. She exhibits no distension and no mass. No tenderness. She has no rebound and no guarding.   Lymphadenopathy:  She has no cervical adenopathy.   Neurological: She is alert. She has normal reflexes. No cranial nerve deficit. She exhibits normal muscle tone.   Skin: Skin is warm and dry. No rash noted. She is not diaphoretic. No erythema.   Psychiatric: She has a normal mood and affect. Her behavior is normal.   Musculoskeletal: She exhibits no edema. Full strength throughout. 2+ DTR throughout.      Latest Reference Range & Units 12/20/22 06:14   WBC 4.8 - 10.8 K/uL 7.0   RBC 4.20 - 5.40 M/uL 4.64   Hemoglobin 12.0 - 16.0 g/dL 14.1   Hematocrit 37.0 - 47.0 % 41.6   MCV 81.4 - 97.8 fL 89.7   MCH 27.0 - 33.0 pg 30.4   MCHC 33.6 - 35.0 " g/dL 33.9   RDW 35.9 - 50.0 fL 40.2   Platelet Count 164 - 446 K/uL 333   MPV 9.0 - 12.9 fL 9.3   Neutrophils-Polys 44.00 - 72.00 % 43.30 (L)   Neutrophils (Absolute) 2.00 - 7.15 K/uL 3.02   Lymphocytes 22.00 - 41.00 % 42.40 (H)   Lymphs (Absolute) 1.00 - 4.80 K/uL 2.96   Monocytes 0.00 - 13.40 % 10.00   Monos (Absolute) 0.00 - 0.85 K/uL 0.70   Eosinophils 0.00 - 6.90 % 3.00   Eos (Absolute) 0.00 - 0.51 K/uL 0.21   Basophils 0.00 - 1.80 % 0.90   Baso (Absolute) 0.00 - 0.12 K/uL 0.06   Immature Granulocytes 0.00 - 0.90 % 0.40   Immature Granulocytes (abs) 0.00 - 0.11 K/uL 0.03   Nucleated RBC /100 WBC 0.00   NRBC (Absolute) K/uL 0.00   Sodium 135 - 145 mmol/L 140   Potassium 3.6 - 5.5 mmol/L 3.8   Chloride 96 - 112 mmol/L 103   Co2 20 - 33 mmol/L 25   Anion Gap 7.0 - 16.0  12.0   Glucose 65 - 99 mg/dL 73   Bun 8 - 22 mg/dL 16   Creatinine 0.50 - 1.40 mg/dL 0.96   GFR (CKD-EPI) >60 mL/min/1.73 m 2 74   Calcium 8.5 - 10.5 mg/dL 9.4   Correct Calcium 8.5 - 10.5 mg/dL 9.0   AST(SGOT) 12 - 45 U/L 18   ALT(SGPT) 2 - 50 U/L 10   Alkaline Phosphatase 30 - 99 U/L 70   Total Bilirubin 0.1 - 1.5 mg/dL 0.5   Albumin 3.2 - 4.9 g/dL 4.5   Total Protein 6.0 - 8.2 g/dL 7.1   Globulin 1.9 - 3.5 g/dL 2.6   A-G Ratio g/dL 1.7   Glycohemoglobin 4.0 - 5.6 % 5.3   Estim. Avg Glu mg/dL 105   Fasting Status  Fasting   Cholesterol,Tot 100 - 199 mg/dL 165   Triglycerides 0 - 149 mg/dL 52   HDL >=40 mg/dL 59   LDL <100 mg/dL 96   25-Hydroxy   Vitamin D 25 30 - 100 ng/mL 31   Vitamin B12 -True Cobalamin 211 - 911 pg/mL 776   TSH 0.380 - 5.330 uIU/mL 5.090   (L): Data is abnormally low  (H): Data is abnormally high      ASSESSMENT/PLAN:    This is a 47 y.o. female  for well exam.     1. Well adult exam   BP on lower end, repeat improved. Recommend adequate fluids and consider pressure stockings.   Recommend healthy diet and routine exercise.   Routine gyn exam, pap smear and mammogram per gynecology.        2. Hypothyroidism, unspecified type-  TSH upper end of normal. Otherwise asymptomatic.   Recommend continue levothyroxine 125 mcg daily. Reviewed taking medications regularly. Repeat labs in 3 months.   Repeat BP normal.  TSH WITH REFLEX TO FT4      3. Need for hepatitis C screening test  HEP C VIRUS ANTIBODY      4. Screen for colon cancer  Referral to GI for Colonoscopy        Follow up in 3 months as needed.   Routine follow up for annual well exam.       This medical record contains text that has been entered with the assistance of computer voice recognition and dictation software.  Therefore, it may contain unintended errors in text, spelling, punctuation, or grammar.

## 2023-01-10 ENCOUNTER — APPOINTMENT (RX ONLY)
Dept: URBAN - METROPOLITAN AREA CLINIC 35 | Facility: CLINIC | Age: 48
Setting detail: DERMATOLOGY
End: 2023-01-10

## 2023-01-10 DIAGNOSIS — D18.0 HEMANGIOMA: ICD-10-CM

## 2023-01-10 DIAGNOSIS — L71.8 OTHER ROSACEA: ICD-10-CM

## 2023-01-10 DIAGNOSIS — L82.1 OTHER SEBORRHEIC KERATOSIS: ICD-10-CM

## 2023-01-10 DIAGNOSIS — L81.4 OTHER MELANIN HYPERPIGMENTATION: ICD-10-CM

## 2023-01-10 DIAGNOSIS — Z71.89 OTHER SPECIFIED COUNSELING: ICD-10-CM

## 2023-01-10 DIAGNOSIS — L21.8 OTHER SEBORRHEIC DERMATITIS: ICD-10-CM

## 2023-01-10 DIAGNOSIS — D22 MELANOCYTIC NEVI: ICD-10-CM

## 2023-01-10 PROBLEM — D18.01 HEMANGIOMA OF SKIN AND SUBCUTANEOUS TISSUE: Status: ACTIVE | Noted: 2023-01-10

## 2023-01-10 PROBLEM — D22.5 MELANOCYTIC NEVI OF TRUNK: Status: ACTIVE | Noted: 2023-01-10

## 2023-01-10 PROCEDURE — ? TREATMENT REGIMEN

## 2023-01-10 PROCEDURE — 99214 OFFICE O/P EST MOD 30 MIN: CPT

## 2023-01-10 PROCEDURE — ? SUNSCREEN RECOMMENDATIONS

## 2023-01-10 PROCEDURE — ? PRESCRIPTION

## 2023-01-10 PROCEDURE — ? COUNSELING

## 2023-01-10 RX ORDER — SODIUM SULFACETAMIDE AND SULFUR 80; 40 MG/ML; MG/ML
1 LOTION TOPICAL DAILY
Qty: 473 | Refills: 11 | Status: ERX | COMMUNITY
Start: 2023-01-10

## 2023-01-10 RX ORDER — IVERMECTIN 10 MG/G
1 CREAM TOPICAL DAILY
Qty: 45 | Refills: 11 | Status: ERX

## 2023-01-10 RX ORDER — DOXYCYCLINE HYCLATE 50 MG/1
1 CAPSULE, GELATIN COATED ORAL DAILY
Qty: 90 | Refills: 3 | Status: ERX | COMMUNITY
Start: 2023-01-10

## 2023-01-10 RX ORDER — OXYMETAZOLINE HYDROCHLORIDE 1 G/100G
1 CREAM TOPICAL
Qty: 30 | Refills: 11 | Status: ERX | COMMUNITY
Start: 2023-01-10

## 2023-01-10 RX ADMIN — DOXYCYCLINE HYCLATE 1: 50 CAPSULE, GELATIN COATED ORAL at 00:00

## 2023-01-10 RX ADMIN — OXYMETAZOLINE HYDROCHLORIDE 1: 1 CREAM TOPICAL at 00:00

## 2023-01-10 RX ADMIN — SODIUM SULFACETAMIDE AND SULFUR 1: 80; 40 LOTION TOPICAL at 00:00

## 2023-01-10 ASSESSMENT — LOCATION DETAILED DESCRIPTION DERM
LOCATION DETAILED: RIGHT MEDIAL SUPERIOR CHEST
LOCATION DETAILED: LEFT CENTRAL EYEBROW
LOCATION DETAILED: LEFT MID-UPPER BACK
LOCATION DETAILED: LEFT SUPERIOR PARIETAL SCALP
LOCATION DETAILED: LEFT INFERIOR CENTRAL MALAR CHEEK
LOCATION DETAILED: LEFT SUPERIOR UPPER BACK
LOCATION DETAILED: RIGHT SUPERIOR UPPER BACK

## 2023-01-10 ASSESSMENT — LOCATION SIMPLE DESCRIPTION DERM
LOCATION SIMPLE: LEFT CHEEK
LOCATION SIMPLE: LEFT UPPER BACK
LOCATION SIMPLE: CHEST
LOCATION SIMPLE: RIGHT UPPER BACK
LOCATION SIMPLE: LEFT EYEBROW
LOCATION SIMPLE: SCALP

## 2023-01-10 ASSESSMENT — LOCATION ZONE DERM
LOCATION ZONE: FACE
LOCATION ZONE: SCALP
LOCATION ZONE: TRUNK

## 2023-01-10 NOTE — PROCEDURE: TREATMENT REGIMEN
Initiate Treatment: - SulfaCleanse 8-4 8 %-4 % topical suspension. Cleanse face and ears once daily\\n\\n- Rhofade 1 % topical cream. Apply once a day
Continue Regimen: - Soolantra 1 % topical cream Apply to facial skin once daily\\n\\n- doxycycline hyclate 50 mg capsule. Take one pill once daily with food
Discontinue Regimen: - Metronidazole 0.75% lotion
Detail Level: Simple
Initiate Treatment: - SulfaCleanse 8-4 8 %-4 % topical suspension. Cleanse face and ears once daily

## 2023-01-18 RX ORDER — CELECOXIB 200 MG/1
200 CAPSULE ORAL DAILY
Qty: 90 CAPSULE | Refills: 0 | Status: SHIPPED | OUTPATIENT
Start: 2023-01-18

## 2023-03-15 ENCOUNTER — HOSPITAL ENCOUNTER (OUTPATIENT)
Dept: LAB | Facility: MEDICAL CENTER | Age: 48
End: 2023-03-15
Attending: FAMILY MEDICINE
Payer: OTHER GOVERNMENT

## 2023-03-15 DIAGNOSIS — E03.9 HYPOTHYROIDISM, UNSPECIFIED TYPE: ICD-10-CM

## 2023-03-15 DIAGNOSIS — Z11.59 NEED FOR HEPATITIS C SCREENING TEST: ICD-10-CM

## 2023-03-15 LAB
HCV AB SER QL: NORMAL
TSH SERPL DL<=0.005 MIU/L-ACNC: 1.98 UIU/ML (ref 0.38–5.33)

## 2023-03-15 PROCEDURE — 36415 COLL VENOUS BLD VENIPUNCTURE: CPT

## 2023-03-15 PROCEDURE — 86803 HEPATITIS C AB TEST: CPT

## 2023-03-15 PROCEDURE — 84443 ASSAY THYROID STIM HORMONE: CPT

## 2023-04-11 ENCOUNTER — APPOINTMENT (RX ONLY)
Dept: URBAN - METROPOLITAN AREA CLINIC 35 | Facility: CLINIC | Age: 48
Setting detail: DERMATOLOGY
End: 2023-04-11

## 2023-04-11 DIAGNOSIS — L82.1 OTHER SEBORRHEIC KERATOSIS: ICD-10-CM

## 2023-04-11 DIAGNOSIS — L71.8 OTHER ROSACEA: ICD-10-CM | Status: INADEQUATELY CONTROLLED

## 2023-04-11 PROCEDURE — 99213 OFFICE O/P EST LOW 20 MIN: CPT

## 2023-04-11 PROCEDURE — ? LIQUID NITROGEN (COSMETIC)

## 2023-04-11 PROCEDURE — ? COUNSELING

## 2023-04-11 PROCEDURE — ? TREATMENT REGIMEN

## 2023-04-11 ASSESSMENT — LOCATION SIMPLE DESCRIPTION DERM
LOCATION SIMPLE: LEFT CHEEK
LOCATION SIMPLE: RIGHT CHEEK
LOCATION SIMPLE: RIGHT ZYGOMA

## 2023-04-11 ASSESSMENT — LOCATION DETAILED DESCRIPTION DERM
LOCATION DETAILED: LEFT INFERIOR CENTRAL MALAR CHEEK
LOCATION DETAILED: RIGHT CENTRAL MALAR CHEEK
LOCATION DETAILED: RIGHT SUPERIOR MEDIAL MALAR CHEEK
LOCATION DETAILED: RIGHT CENTRAL ZYGOMA

## 2023-04-11 ASSESSMENT — LOCATION ZONE DERM: LOCATION ZONE: FACE

## 2023-04-11 NOTE — PROCEDURE: TREATMENT REGIMEN
Continue Regimen: - Soolantra 1 % topical cream Apply to facial skin once daily\\n\\n- doxycycline hyclate 50 mg capsule. Take one pill once daily with food\\n\\n- Rhofade 1 % topical cream. Apply once a day
Discontinue Regimen: - Metronidazole 0.75% lotion
Detail Level: Simple

## 2023-04-11 NOTE — PROCEDURE: LIQUID NITROGEN (COSMETIC)
Render Post-Care Instructions In Note?: no
Detail Level: Detailed
Post-Care Instructions: I reviewed with the patient in detail post-care instructions. Patient is to wear sunprotection, and avoid picking at any of the treated lesions. Pt may apply Vaseline to crusted or scabbing areas.
Spray Paint Text: The liquid nitrogen was applied to the skin utilizing a spray paint frosting technique.
Billing Information: Bill by [employer]
Consent: The patient's consent was obtained including but not limited to risks of crusting, scabbing, blistering, scarring, darker or lighter pigmentary change, recurrence, incomplete removal and infection. The patient understands that the procedure is cosmetic in nature and is not covered by insurance.
Show Spray Paint Technique Variable?: Yes

## 2023-06-05 ENCOUNTER — OFFICE VISIT (OUTPATIENT)
Dept: MEDICAL GROUP | Facility: IMAGING CENTER | Age: 48
End: 2023-06-05
Payer: OTHER GOVERNMENT

## 2023-06-05 VITALS
WEIGHT: 177.8 LBS | RESPIRATION RATE: 16 BRPM | SYSTOLIC BLOOD PRESSURE: 102 MMHG | HEART RATE: 68 BPM | HEIGHT: 66 IN | BODY MASS INDEX: 28.57 KG/M2 | TEMPERATURE: 97.3 F | OXYGEN SATURATION: 95 % | DIASTOLIC BLOOD PRESSURE: 60 MMHG

## 2023-06-05 DIAGNOSIS — M25.551 RIGHT HIP PAIN: ICD-10-CM

## 2023-06-05 PROCEDURE — 3078F DIAST BP <80 MM HG: CPT

## 2023-06-05 PROCEDURE — 99213 OFFICE O/P EST LOW 20 MIN: CPT

## 2023-06-05 PROCEDURE — 3074F SYST BP LT 130 MM HG: CPT

## 2023-06-05 RX ORDER — OXYMETAZOLINE HYDROCHLORIDE 1 G/100G
1 CREAM TOPICAL
COMMUNITY
Start: 2023-05-11

## 2023-06-05 ASSESSMENT — FIBROSIS 4 INDEX: FIB4 SCORE: 0.79

## 2023-06-05 ASSESSMENT — PATIENT HEALTH QUESTIONNAIRE - PHQ9: CLINICAL INTERPRETATION OF PHQ2 SCORE: 0

## 2023-06-05 NOTE — PROGRESS NOTES
Subjective:     CC:   Chief Complaint   Patient presents with    Hip Pain     Pt states right hip its been happing pass 30 days within 6 months taking Celebrex but no relief        HPI:   Jovita presents today to discuss:    Right hip pain  She developed intermittent right hip pain 6 months that has been gradually worsening. Her symptoms were provoked by running. Her symptoms would wax and wane. However, this past week she's had persistent 5/10 throbbing achy right hip pain that can shoot up to 10/10 pain with position changes and running.   She has been taking Celebrex which took the edge off but never fully resolved her symptoms.    Denies trauma or injury.   Denies swelling, redness, or warmth of right hip area.  Denies fevers, chills, malaise, myalgia, arthralgia, saddle anesthesia, sharp shooting pain, numbness, tingling, or weakness to any extremities, or urinary/bowel incontinence.        Past Medical History:   Diagnosis Date    Abscess of right breast 3/16/2018    Allergy     Seasonal    Bulging of thoracic intervertebral disc     Hypothyroidism      Family History   Problem Relation Age of Onset    Ovarian Cancer Mother 65    Heart Disease Mother     Stroke Mother         a fib    Hypertension Mother     Cancer Father         lung, skin- precancerous    Lung Disease Father     Hypertension Brother      History reviewed. No pertinent surgical history.  Social History     Tobacco Use    Smoking status: Never    Smokeless tobacco: Never   Vaping Use    Vaping Use: Never used   Substance Use Topics    Alcohol use: Yes     Comment: occasionally     Drug use: No     Social History     Social History Narrative    , 2 children.      Current Outpatient Medications Ordered in Epic   Medication Sig Dispense Refill    RHOFADE 1 % Cream Apply 1 Drop topically 1 time a day as needed.      levothyroxine (SYNTHROID) 125 MCG Tab TAKE 1 TABLET EVERY MORNING ON AN EMPTY STOMACH 90 Tablet 3    celecoxib (CELEBREX) 200  "MG Cap Take 1 Capsule by mouth every day. 90 Capsule 0    cyclobenzaprine (FLEXERIL) 10 mg Tab Take 1 Tablet by mouth 3 times a day as needed. 30 Tablet 1    methylPREDNISolone (MEDROL DOSEPAK) 4 MG Tablet Therapy Pack As directed on the packaging label. 21 Tab 0    Cholecalciferol (VITAMIN D) 2000 UNIT Tab Take  by mouth every day.       No current Epic-ordered facility-administered medications on file.     Patient has no known allergies.    ROS: see hpi  Gen: no fevers/chills  Pulm: no sob, no cough  CV: no chest pain, no palpitations, no edema  GI: no nausea/vomiting, no diarrhea  Skin: no rash    Objective:   Exam:  /60 (BP Location: Left arm, Patient Position: Sitting, BP Cuff Size: Adult)   Pulse 68   Temp 36.3 °C (97.3 °F)   Resp 16   Ht 1.676 m (5' 6\")   Wt 80.6 kg (177 lb 12.8 oz)   SpO2 95%   BMI 28.70 kg/m²    Body mass index is 28.7 kg/m².    Gen: Alert and oriented, No apparent distress.  HEENT: Head atraumatic, normocephalic. Pupils equal and round.  Neck: Neck is supple without lymphadenopathy.   Lungs: Normal effort, CTA bilaterally, no wheezes, rhonchi, or rales  CV: Regular rate and rhythm. No murmurs, rubs, or gallops.  ABD: +BS. Non-tender, non-distended. No rebound, rigidity, or guarding.  Ext: No clubbing, cyanosis, edema.  MSK: Spinal alignment symmetrical  No tenderness over spinal process or paraspinal muscles.  +tenderness over inner illiacus muscle   Straight leg test-negative  YULIA maneuver-negative    Assessment & Plan:     48 y.o. female with the following -     1. Right hip pain  New and worsening condition. Patient presentation likely muscular sprain. No s/s of infectious etiology, fracture, or neurological deficit. May use Celebrex and Flexeril PRN. Recommend conservative therapy:  - apply heat pack after to affected area for 20 minutes intermittently throughout the day, several times a day.   -Modify or avoid activities that exacerbate pain   -perform ROM movements, " and resume modest physical activity as tolerated.   -Apply proper body mechanics with movement. When symptoms improve, maintain weight-bearing exercise.  -May consider acupuncture for acute low back pain if you have access to a certified acupuncturist.   -May also consider spinal manipulation from a chiropractor in conjunction with conservative therapy for pain management.    -Referral to Physical Therapy for evaluation and treatment in 2-3 weeks or after pain is improved.   If no improvement in symptoms with conservative treatment over 6 weeks, will placed referral to orthopedic.    - Referral to Physical Therapy  - DX-PELVIS-1 OR 2 VIEWS; Future    Medical Decision Making/Course:  In the course of preparing for this visit with review of the pertinent past medical history, recent and past clinic visits, current medications, and performing chart, immunization, medical history and medication reconciliation, and in the further course of obtaining the current history pertinent to the clinic visit today, performing an exam and evaluation, ordering and independently evaluating labs, tests, and/or procedures, prescribing any recommended new medications as noted above, providing any pertinent counseling and education and recommending further coordination of care. This was discussed with patient in a shared-decision making conversation, and they understand and agreed with plan of care.     Return if symptoms worsen or fail to improve.    BHUPINDER Vizcaino   [facility]    Please note that this dictation was created using voice recognition software. I have made every reasonable attempt to correct obvious errors, but I expect that there are errors of grammar and possibly content that I did not discover before finalizing the note.

## 2023-06-07 ENCOUNTER — APPOINTMENT (OUTPATIENT)
Dept: RADIOLOGY | Facility: MEDICAL CENTER | Age: 48
End: 2023-06-07
Payer: OTHER GOVERNMENT

## 2023-06-07 DIAGNOSIS — M25.551 RIGHT HIP PAIN: ICD-10-CM

## 2023-06-07 PROCEDURE — 72170 X-RAY EXAM OF PELVIS: CPT

## 2023-07-05 ENCOUNTER — PHYSICAL THERAPY (OUTPATIENT)
Dept: PHYSICAL THERAPY | Facility: REHABILITATION | Age: 48
End: 2023-07-05
Payer: OTHER GOVERNMENT

## 2023-07-05 DIAGNOSIS — M25.551 RIGHT HIP PAIN: ICD-10-CM

## 2023-07-05 PROCEDURE — 97110 THERAPEUTIC EXERCISES: CPT

## 2023-07-05 PROCEDURE — 97162 PT EVAL MOD COMPLEX 30 MIN: CPT

## 2023-07-05 SDOH — ECONOMIC STABILITY: GENERAL: QUALITY OF LIFE: GOOD

## 2023-07-05 ASSESSMENT — ENCOUNTER SYMPTOMS
PAIN SCALE AT LOWEST: 0
PAIN TIMING: INTERMITTENT
PAIN TIMING: OCCASIONAL
ALLEVIATING FACTORS: HEATING PAD
PAIN SCALE AT HIGHEST: 5
ALLEVIATING FACTORS: PAIN MEDICATION
QUALITY: PULSATING
PAIN SCALE: 3
EXACERBATED BY: RUNNING
AWAKENINGS PER NIGHT: 2
ALLEVIATING FACTORS: ICE
QUALITY: THROBBING
EXACERBATED BY: WALKING
EXACERBATED BY: ACTIVITY

## 2023-07-05 NOTE — OP THERAPY EVALUATION
Outpatient Physical Therapy  INITIAL EVALUATION    [facility] [address], [address]  [place] [address]  Phone:  [phone]    Date of Evaluation: 2023    Patient: Jovita Rosa  YOB: 1975  MRN: [id number]     Referring Provider: [place] [address] [address]   Referring Diagnosis Right hip pain [M25.551]     Time Calculation  Start time: 0300  Stop time: 0345 Time Calculation (min): 45 minutes         Chief Complaint: Hip Problem    Visit Diagnoses     ICD-10-CM   1. Right hip pain  M25.551       Date of onset of impairment: 2023    Subjective:   History of Present Illness:     Date of onset:  2023    Mechanism of injury:  The patient is a 48 year old female who reports (R) hip pain initiating in the fall. She continues to have pain to the front of the hip and to the lateral side of the hip. She has difficulty with sleeping to her (R side at night. Trouble walking from sitting to standing and walking increases her pain levels. Wakes her at night occasionally with rolling in bed   Quality of life:  Good  Headaches:  no headaches  Ear problems: none  Sleep disturbance:  Interrupted sleep  # Times/Night awakened:  2  Pain:     Current pain rating:  3    At best pain ratin    At worst pain ratin    Quality:  Pulsating and throbbing    Pain timing:  Occasional and intermittent    Relieving factors:  Heating pad, ice and pain medication    Aggravating factors:  Activity, running and walking    Progression:  Worsening    Pain Comments::  Running makes her pain worse; feels her condition is worsening with this activity   Social Support:     Lives in:  One-story house  Patient Goals:     Other patient goals:  To decrease pain and be able to run      Past Medical History:   Diagnosis Date    Abscess of right breast 3/16/2018    Allergy     Seasonal    Bulging of thoracic intervertebral disc     Hypothyroidism       No past surgical history on file.  Social History     Tobacco Use    Smoking status: Never    Smokeless tobacco: Never   Vaping Use    Vaping Use: Never used   Substance Use Topics    Alcohol use: Yes     Comment: occasionally      Family and Occupational History     Socioeconomic History    Marital status:      Spouse name: Not on file    Number of children: Not on file    Years of education: Not on file    Highest education level: Not on file   Occupational History    Occupation:        Objective     Observations   Central spine     Positive for lumbar scoliosis and thoracic scoliosis.    Negative for thoracic kyphosis.    Postural Observations  Seated posture: good  Standing posture: good  Correction of posture: has no consistent effect      Palpation     Right   Tenderness of the iliopsoas.     Tenderness     Right Hip   Tenderness in the iliac crest.     Additional Tenderness Details  Increased tenderness to (R) Iliacus    Active Range of Motion     Lumbar   Flexion: decreased  Extension: within functional limits  Left lateral flexion: within functional limits  Right lateral flexion: within functional limits  Left rotation: decreased  Right rotation: within functional limits  Left Hip   Flexion: WFL  Extension: WFL  Abduction: WFL  Adduction: WFL  External rotation (90/90): WFL  Internal rotation (90/90): WFL  Internal rotation (prone): WFL    Right Hip   Flexion: WFL  Extension: WFL  Abduction: WFL  Adduction: WFL  External rotation (90/90): WFL  Internal rotation (90/90): with pain  Internal rotation (prone): with pain    Additional Active Range of Motion Details  Pain with (L) trunk rotation to the (R) hip     Strength:      Left Hip   Planes of Motion   Flexion: 5  Extension: 5  Abduction: 5  Adduction: 5  External rotation: 5  Internal rotation: 5    Right Hip   Planes of Motion   Flexion: 5  Extension: 5  Abduction: 5  Adduction: 5  External rotation: 5  Internal rotation: 4+    Tests      Right Hip   Negative piriformis and scour.   Sanjay: Negative.    Ambulation     Observational Gait   Left step length within functional limits. Increased left stance time and left swing time. Decreased walking speed, stride length, right stance time, right swing time and right step length. Stride width: WFL.    Left foot contact pattern: heel to toe  Right foot contact pattern: heel to toe  Left arm swing: within functional limits  Right arm swing: within functional limits    Functional Assessment   Squat   Trunk lean left.     Single Leg Squat   Left Leg  Left trunk side bending.     Lunge     Right Leg  Pain.     Single Leg Stance   Left: 5 seconds  Right: 5 seconds        Therapeutic Exercises (CPT 31554):     1. bridges, 2 x10 reps    2. kneeling psoas stretch    3. Upright bike    4. psoas stretch in chair    5. supine hip flexion    6. SLR    7. clamshells with modified plank    Therapeutic Treatments and Modalities:     1. Manual Therapy (CPT 24245), (R) iliacus, IASTM    Time-based treatments/modalities:    Physical Therapy Timed Treatment Charges  Therapeutic exercise minutes (CPT 41352): 10 minutes      Assessment, Response and Plan:   Assessment details:  The patient is a 48 year old female who reports (R) hip pain with running and transitioning from sitting to standing and walking. Patient would benefit from skilled physical therapy to address possible (R) iliacus strain working on PROM/AROM, strength and conditioning, manual therapy techniques, functional training and activity tolerance  Barriers to therapy:  None  Prognosis: good    Goals:   Short Term Goals:   1) Indep with HEP   2) Able to sit longer in positions without pain to (R) hip 25%   3) Develops interval training jogging with less (R) hip pain   Short term goal time span:  2-4 weeks      Long Term Goals:    1) Progression/regression with HEP   2) Transitions from seated to standing and walks with less pain by 1-2 levels on VAS   3) Able  to start running with less frequency of pain 50% of the time   Long term goal time span:  4-6 weeks    Plan:   Therapy options:  Physical therapy treatment to continue  Planned therapy interventions:  Neuromuscular Re-education (CPT 72250), Self Care ADL Training (CPT 64575), Therapeutic Activities (CPT 62236) and Therapeutic Exercise (CPT 66196)  Frequency:  2x week  Duration in weeks:  6  Duration in visits:  12  Discussed with:  Patient  Plan details:  Functional Training, Self care: 68762 (1)   Neuromuscular Re-education: 29480 (1)   Therapeutic Activities: 85660 (1)   Therapeutic Exercise: 31032 (1)           Functional Assessment Used  Lower Extremity Functional Scale Percentage: 90     Referring provider co-signature:  I have reviewed this plan of care and my co-signature certifies the need for services.    Certification Period: 07/05/2023 to  08/16/23    Physician Signature: ________________________________ Date: ______________

## 2023-07-14 ENCOUNTER — APPOINTMENT (OUTPATIENT)
Dept: PHYSICAL THERAPY | Facility: REHABILITATION | Age: 48
End: 2023-07-14
Payer: OTHER GOVERNMENT

## 2023-07-21 ENCOUNTER — APPOINTMENT (OUTPATIENT)
Dept: PHYSICAL THERAPY | Facility: REHABILITATION | Age: 48
End: 2023-07-21
Payer: OTHER GOVERNMENT

## 2023-08-03 ENCOUNTER — APPOINTMENT (OUTPATIENT)
Dept: PHYSICAL THERAPY | Facility: REHABILITATION | Age: 48
End: 2023-08-03
Payer: OTHER GOVERNMENT

## 2023-08-07 ENCOUNTER — PHYSICAL THERAPY (OUTPATIENT)
Dept: PHYSICAL THERAPY | Facility: REHABILITATION | Age: 48
End: 2023-08-07
Payer: OTHER GOVERNMENT

## 2023-08-07 DIAGNOSIS — M25.551 RIGHT HIP PAIN: ICD-10-CM

## 2023-08-07 PROCEDURE — 97110 THERAPEUTIC EXERCISES: CPT

## 2023-08-07 PROCEDURE — 97112 NEUROMUSCULAR REEDUCATION: CPT

## 2023-08-07 PROCEDURE — 97140 MANUAL THERAPY 1/> REGIONS: CPT

## 2023-08-07 NOTE — OP THERAPY DAILY TREATMENT
Outpatient Physical Therapy  DAILY TREATMENT     [facility] Outpatient Physical Therapy [place] [address] [address] [facility], [address]  [place] [address]  Phone:  [phone]    Date: 08/07/2023    Patient: Jovita Rosa  YOB: 1975  MRN: [id number]     Time Calculation    Start time: 0300  Stop time: 0345 Time Calculation (min): 45 minutes         Chief Complaint: Hip Problem and Difficulty Walking    Visit #: 2    SUBJECTIVE:  The patient has some pain when she is sitting to the (R) hip aboe the iliac crest when she has been sitting for long periods and getting up from the chair.    OBJECTIVE:  Current objective measures:       Increase AROM in trunk flexion and (L) trunk rotation    Therapeutic Exercises (CPT 63564):     1. bridges, 2 x10 reps at 5 sec hold    2. kneeling psoas stretch    3. Upright bike    4. psoas stretch in chair    5. supine hip flexion, 2 x15 reps (green tb)    6. SLR 4#, 1 x 20x    7. clamshells with modified plank    8. supine toe taps 4#, 1 x 20x    9. treadmiill, xx    10. lunges    11. side lunges    12. 3 way hip    Therapeutic Treatments and Modalities:     1. Manual Therapy (CPT 54301), (R) iliacus, IASTM to the iliac creat to the TFL region    Time-based treatments/modalities:    Physical Therapy Timed Treatment Charges  Manual therapy minutes (CPT 18579): 15 minutes  Neuromusc re-ed, balance, coor, post minutes (CPT 17934): 15 minutes  Therapeutic exercise minutes (CPT 61702): 15 minutes    ASSESSMENT:   Response to treatment: IASTM to the (R) iliac to the TFL proximal to distally; tenderness present. Patient performed PRE with increased resistance in (R) hip flexion from supine. Patient reached fatigue response but no pain during hip flexion exercises. Next: treadmill     PLAN/RECOMMENDATIONS:   Plan for treatment: therapy treatment to continue next visit.  Planned interventions for next visit: continue with current treatment.

## 2023-08-09 ENCOUNTER — APPOINTMENT (OUTPATIENT)
Dept: PHYSICAL THERAPY | Facility: REHABILITATION | Age: 48
End: 2023-08-09
Payer: OTHER GOVERNMENT

## 2023-08-18 ENCOUNTER — PHYSICAL THERAPY (OUTPATIENT)
Dept: PHYSICAL THERAPY | Facility: REHABILITATION | Age: 48
End: 2023-08-18
Payer: OTHER GOVERNMENT

## 2023-08-18 DIAGNOSIS — M25.551 RIGHT HIP PAIN: ICD-10-CM

## 2023-08-18 PROCEDURE — 97112 NEUROMUSCULAR REEDUCATION: CPT

## 2023-08-18 PROCEDURE — 97140 MANUAL THERAPY 1/> REGIONS: CPT

## 2023-08-18 PROCEDURE — 97110 THERAPEUTIC EXERCISES: CPT

## 2023-08-18 NOTE — OP THERAPY DAILY TREATMENT
Outpatient Physical Therapy  DAILY TREATMENT     [facility] Outpatient Physical Therapy [place] [address] [address] [facility], [address]  [place] [address]  Phone:  [phone]    Date: 08/18/2023    Patient: Jovita Rosa  YOB: 1975  MRN: [id number]     Time Calculation                   Chief Complaint: Back Problem and Hip Problem    Visit #: 3    SUBJECTIVE:  The patient  has been running with sprints at the gym in sprinting    OBJECTIVE:  Current objective measures:       Decrease pain to the (R) hip; improve mobility in (L) hip extension     Therapeutic Exercises (CPT 38799):     1. bridges, 2 x10 reps at 5 sec hold    2. kneeling psoas stretch    3. Upright bike    4. psoas stretch in chair, 3 x30 sec    5. supine hip flexion, 2 x15 reps (green tb)    6. SLR 4#, 2 x 8 reps    7. clamshells with modified plank    8. supine toe taps 4#, 1 x 20x    9. treadmiill, 5mph at 0% grade for 5 minutes    10. lunges, 2 x50 feet    11. side lunges    12. 3 way hip    Therapeutic Treatments and Modalities:     1. Manual Therapy (CPT 36313), (R) iliacus, IASTM to the iliac creat to the TFL region    Time-based treatments/modalities:     ASSESSMENT:   Response to treatment:   Treadmill running intervals at 5:00 minutes at 0% grade x 2 ; patient felt good; no hip pain.  Manual therapy techniques providing static and contract relax techniques to stretch psoas and TFL. Tolerated well.    PLAN/RECOMMENDATIONS:   Plan for treatment: therapy treatment to continue next visit.  Planned interventions for next visit: continue with current treatment.

## 2023-08-30 ENCOUNTER — APPOINTMENT (OUTPATIENT)
Dept: PHYSICAL THERAPY | Facility: REHABILITATION | Age: 48
End: 2023-08-30
Payer: OTHER GOVERNMENT

## 2023-10-25 ENCOUNTER — OFFICE VISIT (OUTPATIENT)
Dept: URGENT CARE | Facility: CLINIC | Age: 48
End: 2023-10-25
Payer: OTHER GOVERNMENT

## 2023-10-25 VITALS
DIASTOLIC BLOOD PRESSURE: 80 MMHG | BODY MASS INDEX: 27.94 KG/M2 | WEIGHT: 178 LBS | TEMPERATURE: 98.6 F | HEIGHT: 67 IN | RESPIRATION RATE: 16 BRPM | OXYGEN SATURATION: 99 % | HEART RATE: 78 BPM | SYSTOLIC BLOOD PRESSURE: 140 MMHG

## 2023-10-25 DIAGNOSIS — S16.1XXA STRAIN OF NECK MUSCLE, INITIAL ENCOUNTER: ICD-10-CM

## 2023-10-25 PROCEDURE — 3079F DIAST BP 80-89 MM HG: CPT | Performed by: STUDENT IN AN ORGANIZED HEALTH CARE EDUCATION/TRAINING PROGRAM

## 2023-10-25 PROCEDURE — 99213 OFFICE O/P EST LOW 20 MIN: CPT | Performed by: STUDENT IN AN ORGANIZED HEALTH CARE EDUCATION/TRAINING PROGRAM

## 2023-10-25 PROCEDURE — 3077F SYST BP >= 140 MM HG: CPT | Performed by: STUDENT IN AN ORGANIZED HEALTH CARE EDUCATION/TRAINING PROGRAM

## 2023-10-25 RX ORDER — METHOCARBAMOL 500 MG/1
TABLET, FILM COATED ORAL
Qty: 30 TABLET | Refills: 0 | Status: SHIPPED | OUTPATIENT
Start: 2023-10-25

## 2023-10-25 ASSESSMENT — FIBROSIS 4 INDEX: FIB4 SCORE: 0.79

## 2023-10-25 NOTE — PROGRESS NOTES
"Subjective:   CHIEF COMPLAINT  Chief Complaint   Patient presents with    Neck Pain     X6 days pt \"slept funny\"        HPI  Jovita Rosa is a 48 y.o. female who presents with a chief complaint of right-sided neck pain x5 days.  Said she woke up with discomfort.  Believes she slept in an awkward position.  Says pain has been constant since that time.  Symptoms are aggravated with flexion and rotation of her neck.  She has tried Celebrex bid, heat and using a hot tub which has not helped.  There is no radiculopathy.  No trauma or impact to her C-spine.  No history of similar symptoms.  No history of C-spine surgery.  No fevers or systemic symptoms.    REVIEW OF SYSTEMS  General: no fever or chills  GI: no nausea or vomiting  See HPI for further details.    PAST MEDICAL HISTORY   has a past medical history of Abscess of right breast (3/16/2018), Allergy, Bulging of thoracic intervertebral disc, and Hypothyroidism.    SURGICAL HISTORY  patient denies any surgical history    ALLERGIES  No Known Allergies    CURRENT MEDICATIONS  Home Medications       Reviewed by Calos Singletary D.O. (Physician) on 10/25/23 at 1104  Med List Status: <None>     Medication Last Dose Status   celecoxib (CELEBREX) 200 MG Cap Taking Active   Cholecalciferol (VITAMIN D) 2000 UNIT Tab Taking Active   cyclobenzaprine (FLEXERIL) 10 mg Tab PRN Active   levothyroxine (SYNTHROID) 125 MCG Tab Taking Active   methocarbamol (ROBAXIN) 500 MG Tab  Active   methylPREDNISolone (MEDROL DOSEPAK) 4 MG Tablet Therapy Pack Not Taking Active   RHOFADE 1 % Cream PRN Active                    SOCIAL HISTORY  Social History     Tobacco Use    Smoking status: Never    Smokeless tobacco: Never   Vaping Use    Vaping Use: Never used   Substance and Sexual Activity    Alcohol use: Yes     Comment: occasionally     Drug use: No    Sexual activity: Yes     Partners: Male     Birth control/protection: I.U.D.       FAMILY HISTORY  Family History   Problem Relation " "Age of Onset    Ovarian Cancer Mother 65    Heart Disease Mother     Stroke Mother         a fib    Hypertension Mother     Cancer Father         lung, skin- precancerous    Lung Disease Father     Hypertension Brother           Objective:   PHYSICAL EXAM  VITAL SIGNS: BP (!) 140/80 (BP Location: Left arm, Patient Position: Sitting, BP Cuff Size: Adult)   Pulse 78   Temp 37 °C (98.6 °F) (Temporal)   Resp 16   Ht 1.702 m (5' 7\")   Wt 80.7 kg (178 lb)   SpO2 99%   BMI 27.88 kg/m²     Gen: no acute distress, normal voice  Skin: dry, intact, moist mucosal membranes  Eyes: No conjunctival injection b/l  Neck: Normal range of motion. No meningeal signs.   Lungs: No increased work of breathing.  CTAB w/ symmetric expansion  CV: RRR w/o murmurs or clicks  MSK: C-spine: No erythema, ecchymosis or edema.  Holding head in upright position with decreased ROM most notable with bilateral rotation secondary to discomfort.  No focal midline TTP.  No tenderness to palpation within the soft tissue.  Unable to reproduce symptoms.  No distal motor or sensory deficits of bilateral upper extremities.    Assessment/Plan:     1. Strain of neck muscle, initial encounter  methocarbamol (ROBAXIN) 500 MG Tab      Exam was reassuring.  No red flags.  -Encouraged early range of motion and stretches.  Discussed home exercises  -Continue Celebrex  -Tylenol 1000 mg 3 times daily as needed  -Ordered Robaxin.  Side effects discussed including drowsiness  -Return to urgent care any new/worsening symptoms or further questions or concerns.  Patient understood everything discussed.  All questions were answered.      Differential diagnosis and supportive care discussed. Follow-up as needed if symptoms worsen or fail to improve to PCP, Urgent care or Emergency Room.    Please note that this dictation was created using voice recognition software. I have made a reasonable attempt to correct obvious errors, but I expect that there are errors of grammar " and possibly content that I did not discover before finalizing the note.

## 2023-11-16 NOTE — PROGRESS NOTES
Monitor: The problem is unchanged.  Evaluation: No labs/tests required today.  Assessment/Treatment:  Continue current treatment/monitoring regimen.     SUBJECTIVE:      Chief Complaint   Patient presents with   • Breast Problem     right       HPI:     Jovita Rosa is a 42 y.o. female here for follow up of cyst on right medial breast area. Was seen on 3/5/18 and had incision and drainage of the area.   States initially in 2002- noticed pea sized lump in same area of right breast- had u/s which showed a breast cyst, otherwise findings were not concerning. Within the past 6 months she feels that the cyst area has grown larger. And over the weekend prior to her last visit the area got larger and became red in the area.   Initially was red and some pus noted recently. She was treated with antibiotic therapy and prior erythema has improved. Has been about 5 days since her last dose of antibiotics.   She feels the area that was previously drainage has improved but that there is something possibly different going on underneath. Noticed pain in the area if it is bumped but not at rest. Difficult to sleep on stomach. No other skin changes.  She currently has a breast ultrasound and mammogram scheduled in April.   No other trauma to area or possible aggravating factors.       ROS:  Denies any recent fevers or chills. No nausea or vomiting. No diarrhea. No chest pains or shortness of breath. No lower extremity edema.    Current Outpatient Prescriptions on File Prior to Visit   Medication Sig Dispense Refill   • levothyroxine (SYNTHROID) 125 MCG Tab Take 125 mcg by mouth See Admin Instructions. Pt takes on Mon-Fri     • MethylPREDNISolone (MEDROL DOSEPAK) 4 MG Tablet Therapy Pack As directed on the packaging label. 21 Tab 0   • celecoxib (CELEBREX) 200 MG Cap Take 200 mg by mouth every day.     • acetaminophen (TYLENOL) 500 MG Tab Take 1,000 mg by mouth every 6 hours as needed for Moderate Pain.       No current facility-administered medications on file prior to visit.        No Known Allergies    Patient Active Problem List    Diagnosis Date Noted   • Breast pain, right  "03/16/2018   • Breast cyst, right 03/16/2018   • Abscess of right breast 03/16/2018   • Episodic low back pain 01/18/2018   • Leukocytosis 01/18/2018   • Seasonal allergic rhinitis 01/18/2018   • Hypothyroidism 01/18/2018   • Family history of ovarian cancer 01/18/2018   • Bulging of thoracic intervertebral disc 01/18/2018   • DDD (degenerative disc disease), lumbar 01/18/2018   • Weight gain 02/03/2017   • IUD (intrauterine device) in place 02/03/2017   • Psoriasis 02/03/2017       Past Medical History:   Diagnosis Date   • Allergy     Seasonal   • Bulging of thoracic intervertebral disc    • Hypothyroidism          OBJECTIVE:   /72   Pulse (!) 55   Temp 37 °C (98.6 °F)   Ht 1.676 m (5' 6\")   Wt 78.6 kg (173 lb 4.5 oz)   SpO2 95%   BMI 27.97 kg/m²   General: Well-developed well-nourished female, no acute distress  Neck: supple, no lymphadenopathy- cervical or supraclavicular, no thyromegaly  Extremities: no cyanosis, clubbing, edema  Skin: Warm and dry  Psych: appropriate mood and affect  BREAST EXAM: right lower medial margin of breast with ~ 2-3 cm x 1-2 cm area of thickening of underlying breast tissue, area with mild TTP,  no significant erythema, small central pinpoint opening with minimal drainage, slightly medial region pinpoint opening with scant slightly pale yellow drainage expressed. No significant area of fluctuance for incision and drainage. No nipple discharge or bleeding. No axillary NELLIE.         ASSESSMENT/PLAN:    42 y.o.female    1. Abscess of right breast- previous I&D and antibiotic treatment with improvement, minimal drainage currently. Culture taken.   -Recommend warm compress at this time. Will consider repeat I &D or surgical referral as needed.   CULTURE WOUND W/ GRAM STAIN   2. Breast pain, right- as above. Symptoms are intermittent depending on activities.   CULTURE WOUND W/ GRAM STAIN   3. Breast mass, right- component of breast tissue thickening is likely due to associated " inflammation of breast cyst. However, recommend completion of imaging due to prior history of cyst of area and growth.      4. History of cyst of breast     Will continue to monitor carefully.     Return in about 1 week (around 3/23/2018).    This medical record contains text that has been entered with the assistance of computer voice recognition and dictation software.  Therefore, it may contain unintended errors in text, spelling, punctuation, or grammar.

## 2024-01-09 ENCOUNTER — APPOINTMENT (RX ONLY)
Dept: URBAN - METROPOLITAN AREA CLINIC 35 | Facility: CLINIC | Age: 49
Setting detail: DERMATOLOGY
End: 2024-01-09

## 2024-01-09 DIAGNOSIS — L21.8 OTHER SEBORRHEIC DERMATITIS: ICD-10-CM

## 2024-01-09 DIAGNOSIS — L81.4 OTHER MELANIN HYPERPIGMENTATION: ICD-10-CM

## 2024-01-09 DIAGNOSIS — L71.8 OTHER ROSACEA: ICD-10-CM

## 2024-01-09 DIAGNOSIS — Z71.89 OTHER SPECIFIED COUNSELING: ICD-10-CM

## 2024-01-09 DIAGNOSIS — D22 MELANOCYTIC NEVI: ICD-10-CM

## 2024-01-09 DIAGNOSIS — L82.1 OTHER SEBORRHEIC KERATOSIS: ICD-10-CM

## 2024-01-09 DIAGNOSIS — D18.0 HEMANGIOMA: ICD-10-CM

## 2024-01-09 DIAGNOSIS — L73.8 OTHER SPECIFIED FOLLICULAR DISORDERS: ICD-10-CM

## 2024-01-09 PROBLEM — D22.5 MELANOCYTIC NEVI OF TRUNK: Status: ACTIVE | Noted: 2024-01-09

## 2024-01-09 PROBLEM — D18.01 HEMANGIOMA OF SKIN AND SUBCUTANEOUS TISSUE: Status: ACTIVE | Noted: 2024-01-09

## 2024-01-09 PROCEDURE — 99214 OFFICE O/P EST MOD 30 MIN: CPT

## 2024-01-09 PROCEDURE — ? TREATMENT REGIMEN

## 2024-01-09 PROCEDURE — ? SUNSCREEN RECOMMENDATIONS

## 2024-01-09 PROCEDURE — ? COUNSELING

## 2024-01-09 PROCEDURE — ? PRESCRIPTION

## 2024-01-09 RX ORDER — IVERMECTIN 10 MG/G
1 CREAM TOPICAL QD
Qty: 45 | Refills: 11 | Status: ERX

## 2024-01-09 RX ORDER — SODIUM SULFACETAMIDE AND SULFUR 80; 40 MG/ML; MG/ML
1 LOTION TOPICAL DAILY
Qty: 473 | Refills: 11 | Status: ERX | COMMUNITY
Start: 2024-01-09

## 2024-01-09 RX ORDER — OXYMETAZOLINE HYDROCHLORIDE 1 G/100G
1 CREAM TOPICAL
Qty: 30 | Refills: 11 | Status: ERX

## 2024-01-09 RX ORDER — DOXYCYCLINE HYCLATE 50 MG/1
1 CAPSULE, GELATIN COATED ORAL DAILY
Qty: 90 | Refills: 3 | Status: ERX | COMMUNITY
Start: 2024-01-09

## 2024-01-09 RX ADMIN — DOXYCYCLINE HYCLATE 1: 50 CAPSULE, GELATIN COATED ORAL at 00:00

## 2024-01-09 RX ADMIN — SODIUM SULFACETAMIDE AND SULFUR 1: 80; 40 LOTION TOPICAL at 00:00

## 2024-01-09 ASSESSMENT — LOCATION SIMPLE DESCRIPTION DERM
LOCATION SIMPLE: CHEST
LOCATION SIMPLE: LEFT EYEBROW
LOCATION SIMPLE: SCALP
LOCATION SIMPLE: LEFT CHEEK
LOCATION SIMPLE: LEFT UPPER BACK
LOCATION SIMPLE: RIGHT UPPER BACK
LOCATION SIMPLE: RIGHT CHEEK

## 2024-01-09 ASSESSMENT — LOCATION ZONE DERM
LOCATION ZONE: FACE
LOCATION ZONE: TRUNK
LOCATION ZONE: SCALP

## 2024-01-09 ASSESSMENT — LOCATION DETAILED DESCRIPTION DERM
LOCATION DETAILED: RIGHT SUPERIOR UPPER BACK
LOCATION DETAILED: LEFT CENTRAL EYEBROW
LOCATION DETAILED: LEFT MID-UPPER BACK
LOCATION DETAILED: LEFT SUPERIOR UPPER BACK
LOCATION DETAILED: LEFT INFERIOR CENTRAL MALAR CHEEK
LOCATION DETAILED: LEFT SUPERIOR PARIETAL SCALP
LOCATION DETAILED: RIGHT MEDIAL SUPERIOR CHEST
LOCATION DETAILED: RIGHT INFERIOR MEDIAL MALAR CHEEK

## 2024-01-09 NOTE — PROCEDURE: TREATMENT REGIMEN
Continue Regimen: - Soolantra 1 % topical cream Apply to facial skin once daily\\n\\n- doxycycline hyclate 50 mg capsule. Take one pill once daily with food\\n\\n- Rhofade 1 % topical cream. Apply once a day\\n\\n-  SulfaCleanse 8-4 8 %-4 % topical suspension. Cleanse face and ears once daily.
Discontinue Regimen: - Metronidazole 0.75% lotion
Detail Level: Simple
Continue Regimen: - SulfaCleanse 8-4 8 %-4 % topical suspension. Cleanse face and ears once daily

## 2024-01-15 ENCOUNTER — RX ONLY (OUTPATIENT)
Age: 49
Setting detail: RX ONLY
End: 2024-01-15

## 2024-01-15 RX ORDER — SODIUM SULFACETAMIDE AND SULFUR 80; 40 MG/ML; MG/ML
1 SOLUTION TOPICAL DAILY
Qty: 473 | Refills: 11 | Status: ERX | COMMUNITY
Start: 2024-01-15

## 2024-03-08 ENCOUNTER — HOSPITAL ENCOUNTER (OUTPATIENT)
Dept: LAB | Facility: MEDICAL CENTER | Age: 49
End: 2024-03-08
Attending: FAMILY MEDICINE
Payer: OTHER GOVERNMENT

## 2024-03-08 DIAGNOSIS — Z00.00 WELL ADULT EXAM: ICD-10-CM

## 2024-03-08 LAB
25(OH)D3 SERPL-MCNC: 33 NG/ML (ref 30–100)
ALBUMIN SERPL BCP-MCNC: 4.5 G/DL (ref 3.2–4.9)
ALBUMIN/GLOB SERPL: 1.7 G/DL
ALP SERPL-CCNC: 64 U/L (ref 30–99)
ALT SERPL-CCNC: 12 U/L (ref 2–50)
ANION GAP SERPL CALC-SCNC: 10 MMOL/L (ref 7–16)
AST SERPL-CCNC: 22 U/L (ref 12–45)
BASOPHILS # BLD AUTO: 0.7 % (ref 0–1.8)
BASOPHILS # BLD: 0.05 K/UL (ref 0–0.12)
BILIRUB SERPL-MCNC: 0.8 MG/DL (ref 0.1–1.5)
BUN SERPL-MCNC: 13 MG/DL (ref 8–22)
CALCIUM ALBUM COR SERPL-MCNC: 8.8 MG/DL (ref 8.5–10.5)
CALCIUM SERPL-MCNC: 9.2 MG/DL (ref 8.5–10.5)
CHLORIDE SERPL-SCNC: 104 MMOL/L (ref 96–112)
CHOLEST SERPL-MCNC: 198 MG/DL (ref 100–199)
CO2 SERPL-SCNC: 25 MMOL/L (ref 20–33)
CREAT SERPL-MCNC: 0.7 MG/DL (ref 0.5–1.4)
EOSINOPHIL # BLD AUTO: 0.31 K/UL (ref 0–0.51)
EOSINOPHIL NFR BLD: 4.6 % (ref 0–6.9)
ERYTHROCYTE [DISTWIDTH] IN BLOOD BY AUTOMATED COUNT: 41.4 FL (ref 35.9–50)
EST. AVERAGE GLUCOSE BLD GHB EST-MCNC: 108 MG/DL
GFR SERPLBLD CREATININE-BSD FMLA CKD-EPI: 107 ML/MIN/1.73 M 2
GLOBULIN SER CALC-MCNC: 2.6 G/DL (ref 1.9–3.5)
GLUCOSE SERPL-MCNC: 89 MG/DL (ref 65–99)
HBA1C MFR BLD: 5.4 % (ref 4–5.6)
HCT VFR BLD AUTO: 41.2 % (ref 37–47)
HDLC SERPL-MCNC: 72 MG/DL
HGB BLD-MCNC: 14.2 G/DL (ref 12–16)
IMM GRANULOCYTES # BLD AUTO: 0.03 K/UL (ref 0–0.11)
IMM GRANULOCYTES NFR BLD AUTO: 0.4 % (ref 0–0.9)
LDLC SERPL CALC-MCNC: 114 MG/DL
LYMPHOCYTES # BLD AUTO: 2.56 K/UL (ref 1–4.8)
LYMPHOCYTES NFR BLD: 37.6 % (ref 22–41)
MCH RBC QN AUTO: 31.1 PG (ref 27–33)
MCHC RBC AUTO-ENTMCNC: 34.5 G/DL (ref 32.2–35.5)
MCV RBC AUTO: 90.4 FL (ref 81.4–97.8)
MONOCYTES # BLD AUTO: 0.69 K/UL (ref 0–0.85)
MONOCYTES NFR BLD AUTO: 10.1 % (ref 0–13.4)
NEUTROPHILS # BLD AUTO: 3.16 K/UL (ref 1.82–7.42)
NEUTROPHILS NFR BLD: 46.6 % (ref 44–72)
NRBC # BLD AUTO: 0 K/UL
NRBC BLD-RTO: 0 /100 WBC (ref 0–0.2)
PLATELET # BLD AUTO: 325 K/UL (ref 164–446)
PMV BLD AUTO: 9.5 FL (ref 9–12.9)
POTASSIUM SERPL-SCNC: 4.2 MMOL/L (ref 3.6–5.5)
PROT SERPL-MCNC: 7.1 G/DL (ref 6–8.2)
RBC # BLD AUTO: 4.56 M/UL (ref 4.2–5.4)
SODIUM SERPL-SCNC: 139 MMOL/L (ref 135–145)
TRIGL SERPL-MCNC: 61 MG/DL (ref 0–149)
TSH SERPL DL<=0.005 MIU/L-ACNC: 2.94 UIU/ML (ref 0.38–5.33)
WBC # BLD AUTO: 6.8 K/UL (ref 4.8–10.8)

## 2024-03-08 PROCEDURE — 80053 COMPREHEN METABOLIC PANEL: CPT

## 2024-03-08 PROCEDURE — 84443 ASSAY THYROID STIM HORMONE: CPT

## 2024-03-08 PROCEDURE — 80061 LIPID PANEL: CPT

## 2024-03-08 PROCEDURE — 36415 COLL VENOUS BLD VENIPUNCTURE: CPT

## 2024-03-08 PROCEDURE — 83036 HEMOGLOBIN GLYCOSYLATED A1C: CPT

## 2024-03-08 PROCEDURE — 82306 VITAMIN D 25 HYDROXY: CPT

## 2024-03-08 PROCEDURE — 85025 COMPLETE CBC W/AUTO DIFF WBC: CPT

## 2024-04-05 ENCOUNTER — HOSPITAL ENCOUNTER (EMERGENCY)
Facility: MEDICAL CENTER | Age: 49
End: 2024-04-05
Attending: EMERGENCY MEDICINE
Payer: OTHER GOVERNMENT

## 2024-04-05 VITALS
BODY MASS INDEX: 28.84 KG/M2 | WEIGHT: 179.45 LBS | TEMPERATURE: 97 F | SYSTOLIC BLOOD PRESSURE: 124 MMHG | RESPIRATION RATE: 18 BRPM | HEIGHT: 66 IN | OXYGEN SATURATION: 99 % | DIASTOLIC BLOOD PRESSURE: 59 MMHG | HEART RATE: 57 BPM

## 2024-04-05 DIAGNOSIS — G89.29 CHRONIC MIDLINE BACK PAIN, UNSPECIFIED BACK LOCATION: ICD-10-CM

## 2024-04-05 DIAGNOSIS — T50.905A MEDICATION REACTION, INITIAL ENCOUNTER: ICD-10-CM

## 2024-04-05 DIAGNOSIS — M54.9 CHRONIC MIDLINE BACK PAIN, UNSPECIFIED BACK LOCATION: ICD-10-CM

## 2024-04-05 PROCEDURE — A9270 NON-COVERED ITEM OR SERVICE: HCPCS | Performed by: EMERGENCY MEDICINE

## 2024-04-05 PROCEDURE — 700102 HCHG RX REV CODE 250 W/ 637 OVERRIDE(OP): Performed by: EMERGENCY MEDICINE

## 2024-04-05 PROCEDURE — 700111 HCHG RX REV CODE 636 W/ 250 OVERRIDE (IP): Performed by: EMERGENCY MEDICINE

## 2024-04-05 PROCEDURE — 99283 EMERGENCY DEPT VISIT LOW MDM: CPT

## 2024-04-05 RX ORDER — DIPHENHYDRAMINE HCL 25 MG
50 TABLET ORAL ONCE
Status: COMPLETED | OUTPATIENT
Start: 2024-04-05 | End: 2024-04-05

## 2024-04-05 RX ORDER — PREDNISONE 20 MG/1
20 TABLET ORAL ONCE
Status: COMPLETED | OUTPATIENT
Start: 2024-04-05 | End: 2024-04-05

## 2024-04-05 RX ORDER — GABAPENTIN 100 MG/1
100 CAPSULE ORAL 3 TIMES DAILY
Qty: 30 CAPSULE | Refills: 0 | Status: SHIPPED | OUTPATIENT
Start: 2024-04-05 | End: 2024-04-15

## 2024-04-05 RX ADMIN — DIPHENHYDRAMINE HYDROCHLORIDE 50 MG: 25 TABLET ORAL at 14:37

## 2024-04-05 RX ADMIN — PREDNISONE 20 MG: 20 TABLET ORAL at 14:37

## 2024-04-05 ASSESSMENT — FIBROSIS 4 INDEX: FIB4 SCORE: 0.9

## 2024-04-05 NOTE — ED NOTES
Pt ready for discharge. Discharge education was provided to pt by this RN. Pt verbalized understanding & all questions were answered. Pt AoX 4. Pt ambulated independently with a steady gait out of the ED with all belongings. Pt encouraged to come back if symptoms worsen.

## 2024-04-05 NOTE — ED PROVIDER NOTES
ED Provider Note    CHIEF COMPLAINT  Chief Complaint   Patient presents with    Medication Refill     Patient is taking generic celebrex. She took one dose about 2 hours ago and is now having flushed skin and GI upset. Denies difficulty breathing or issues with airway.          HPI/ROS  LIMITATION TO HISTORY   None    Jovita Rosa is a 48 y.o. female who presents stating that she has taken generic Celebrex for a number of years without issue episodically for chronic back pain.  She took some today and she said that she started having a reaction including feeling itchy and flush on skin but no urticaria, difficulty breathing or swallowing and is concerned that she has had a bad reaction.  She said that this happened at least once before however and she thinks it might be a different manufacture issue with Celebrex.    PAST MEDICAL HISTORY   has a past medical history of Abscess of right breast (3/16/2018), Allergy, Bulging of thoracic intervertebral disc, and Hypothyroidism.    SURGICAL HISTORY  patient denies any surgical history    FAMILY HISTORY  Family History   Problem Relation Age of Onset    Ovarian Cancer Mother 65    Heart Disease Mother     Stroke Mother         a fib    Hypertension Mother     Cancer Father         lung, skin- precancerous    Lung Disease Father     Hypertension Brother        SOCIAL HISTORY  Social History     Tobacco Use    Smoking status: Never    Smokeless tobacco: Never   Vaping Use    Vaping Use: Never used   Substance and Sexual Activity    Alcohol use: Yes     Comment: occasionally     Drug use: No    Sexual activity: Yes     Partners: Male     Birth control/protection: I.U.D.       CURRENT MEDICATIONS  Home Medications    **Home medications have not yet been reviewed for this encounter**         ALLERGIES  Allergies   Allergen Reactions    Celebrex [Celecoxib] Diarrhea, Rash and Nausea     GI upset       PHYSICAL EXAM  VITAL SIGNS: /59   Pulse (!) 57   Temp 36.1 °C  "(97 °F) (Temporal)   Resp 18   Ht 1.676 m (5' 6\")   Wt 81.4 kg (179 lb 7.3 oz)   SpO2 99%   BMI 28.96 kg/m²    Constitutional: Well developed, Well nourished, No acute distress, Non-toxic appearance.   HENT: Normocephalic, Atraumatic, Bilateral external ears normal, Oropharynx is clear mucous membranes are moist. No oral exudates or nasal discharge.   Eyes: Pupils are equal round and reactive, EOMI, Conjunctiva normal, No discharge.   Neck: Normal range of motion, No tenderness, Supple, No stridor. No meningismus.  Lymphatic: No lymphadenopathy noted.   Cardiovascular: Regular rate and rhythm without murmur rub or gallop.  Thorax & Lungs: Clear breath sounds bilaterally without wheezes, rhonchi or rales. There is no chest wall tenderness.   Abdomen: Soft non-tender non-distended. There is no rebound or guarding. No organomegaly is appreciated. Bowel sounds are normal.  Skin: Normal without rash.   Back: No CVA or spinal tenderness.   Extremities: Intact distal pulses, No edema, No tenderness, No cyanosis, No clubbing. Capillary refill is less than 2 seconds.  Musculoskeletal: Good range of motion in all major joints. No tenderness to palpation or major deformities noted.   Neurologic: Alert & oriented x 3, Normal motor function, Normal sensory function, No focal deficits noted. Reflexes are normal.  Psychiatric: Affect normal, Judgment normal, Mood normal. There is no suicidal ideation or patient reported hallucinations.         COURSE & MEDICAL DECISION MAKING    ASSESSMENT, COURSE AND PLAN  Care Narrative: Patient was given Benadryl and prednisone and observed for time in the emergency department and did not have progression of symptoms.  Had some slight resolution.    I think she would benefit from Neurontin given that she can no longer take Celebrex and is not that keen on other NSAIDs.  I will start her on 100 mg 3 times a day as needed and she may not only need this episodically and she will talk with her " doctor about a longer prescription if it seems to work without any untoward reaction.      DISPOSITION AND DISCUSSIONS  Patient is instructed to return if any significant change in symptoms develop such as late phase reactants causing her more difficulty breathing or swallowing or skin findings    FINAL DIAGNOSIS  1. Medication reaction, initial encounter    2. Chronic midline back pain, unspecified back location           Electronically signed by: Damien Corea M.D., 4/5/2024 3:07 PM

## 2024-04-05 NOTE — ED TRIAGE NOTES
"Chief Complaint   Patient presents with    Medication Refill     Patient is taking generic celebrex. She took one dose about 2 hours ago and is now having flushed skin and GI upset. Denies difficulty breathing or issues with airway.        Patient to triage ambulatory with a steady gait, AAOx4, Appropriate precautions in place.     Explained wait time and triage process. Placed back in lobby. Told to notify ED tech or RN of any changes, verbalized understanding.    BP 94/56   Pulse 95   Temp 36 °C (96.8 °F) (Temporal)   Resp 16   Ht 1.676 m (5' 6\")   Wt 81.4 kg (179 lb 7.3 oz)   SpO2 98%   BMI 28.96 kg/m²     "

## 2024-04-05 NOTE — DISCHARGE INSTRUCTIONS
Please stop using Celebrex as you may have another allergy to this medication develop and I am going to start you on Neurontin instead for occasional use for your chronic back pain.  Please follow-up with your doctor to discuss other options as well

## 2024-04-17 ENCOUNTER — APPOINTMENT (OUTPATIENT)
Dept: MEDICAL GROUP | Facility: IMAGING CENTER | Age: 49
End: 2024-04-17
Payer: OTHER GOVERNMENT

## 2024-04-17 VITALS
HEART RATE: 72 BPM | RESPIRATION RATE: 16 BRPM | BODY MASS INDEX: 27.15 KG/M2 | OXYGEN SATURATION: 98 % | DIASTOLIC BLOOD PRESSURE: 70 MMHG | HEIGHT: 67 IN | WEIGHT: 173 LBS | TEMPERATURE: 98.1 F | SYSTOLIC BLOOD PRESSURE: 116 MMHG

## 2024-04-17 DIAGNOSIS — E03.9 HYPOTHYROIDISM, UNSPECIFIED TYPE: ICD-10-CM

## 2024-04-17 DIAGNOSIS — M51.34 BULGING OF THORACIC INTERVERTEBRAL DISC: ICD-10-CM

## 2024-04-17 DIAGNOSIS — Z12.31 ENCOUNTER FOR SCREENING MAMMOGRAM FOR MALIGNANT NEOPLASM OF BREAST: ICD-10-CM

## 2024-04-17 DIAGNOSIS — N39.3 STRESS INCONTINENCE: ICD-10-CM

## 2024-04-17 DIAGNOSIS — M62.838 MUSCLE SPASM: ICD-10-CM

## 2024-04-17 DIAGNOSIS — M51.36 DDD (DEGENERATIVE DISC DISEASE), LUMBAR: ICD-10-CM

## 2024-04-17 DIAGNOSIS — Z00.00 WELL ADULT EXAM: ICD-10-CM

## 2024-04-17 DIAGNOSIS — M54.50 EPISODIC LOW BACK PAIN: ICD-10-CM

## 2024-04-17 DIAGNOSIS — F51.02 TRANSIENT INSOMNIA: ICD-10-CM

## 2024-04-17 PROBLEM — D72.829 LEUKOCYTOSIS: Status: RESOLVED | Noted: 2018-01-18 | Resolved: 2024-04-17

## 2024-04-17 PROCEDURE — 1126F AMNT PAIN NOTED NONE PRSNT: CPT | Performed by: FAMILY MEDICINE

## 2024-04-17 PROCEDURE — 99396 PREV VISIT EST AGE 40-64: CPT | Performed by: FAMILY MEDICINE

## 2024-04-17 PROCEDURE — 3078F DIAST BP <80 MM HG: CPT | Performed by: FAMILY MEDICINE

## 2024-04-17 PROCEDURE — 3074F SYST BP LT 130 MM HG: CPT | Performed by: FAMILY MEDICINE

## 2024-04-17 RX ORDER — METHYLPREDNISOLONE 4 MG/1
TABLET ORAL
Qty: 21 TABLET | Refills: 0 | Status: SHIPPED | OUTPATIENT
Start: 2024-04-17

## 2024-04-17 RX ORDER — LEVOTHYROXINE SODIUM 0.12 MG/1
125 TABLET ORAL
Qty: 10 TABLET | Refills: 0 | Status: SHIPPED | OUTPATIENT
Start: 2024-04-17

## 2024-04-17 RX ORDER — NAPROXEN 500 MG/1
500 TABLET ORAL 2 TIMES DAILY WITH MEALS
Qty: 60 TABLET | Refills: 1 | Status: SHIPPED | OUTPATIENT
Start: 2024-04-17

## 2024-04-17 RX ORDER — LEVOTHYROXINE SODIUM 0.12 MG/1
125 TABLET ORAL
Qty: 90 TABLET | Refills: 3 | Status: SHIPPED | OUTPATIENT
Start: 2024-04-17

## 2024-04-17 RX ORDER — ZOLPIDEM TARTRATE 10 MG/1
10 TABLET ORAL NIGHTLY PRN
Qty: 30 TABLET | Refills: 0 | Status: SHIPPED | OUTPATIENT
Start: 2024-04-17 | End: 2024-07-16

## 2024-04-17 RX ORDER — METHOCARBAMOL 500 MG/1
TABLET, FILM COATED ORAL
Qty: 30 TABLET | Refills: 0 | Status: SHIPPED | OUTPATIENT
Start: 2024-04-17

## 2024-04-17 SDOH — ECONOMIC STABILITY: HOUSING INSECURITY
IN THE LAST 12 MONTHS, WAS THERE A TIME WHEN YOU DID NOT HAVE A STEADY PLACE TO SLEEP OR SLEPT IN A SHELTER (INCLUDING NOW)?: NO

## 2024-04-17 SDOH — ECONOMIC STABILITY: TRANSPORTATION INSECURITY
IN THE PAST 12 MONTHS, HAS LACK OF RELIABLE TRANSPORTATION KEPT YOU FROM MEDICAL APPOINTMENTS, MEETINGS, WORK OR FROM GETTING THINGS NEEDED FOR DAILY LIVING?: NO

## 2024-04-17 SDOH — ECONOMIC STABILITY: FOOD INSECURITY: WITHIN THE PAST 12 MONTHS, THE FOOD YOU BOUGHT JUST DIDN'T LAST AND YOU DIDN'T HAVE MONEY TO GET MORE.: NEVER TRUE

## 2024-04-17 SDOH — ECONOMIC STABILITY: HOUSING INSECURITY: IN THE LAST 12 MONTHS, HOW MANY PLACES HAVE YOU LIVED?: 1

## 2024-04-17 SDOH — HEALTH STABILITY: PHYSICAL HEALTH: ON AVERAGE, HOW MANY MINUTES DO YOU ENGAGE IN EXERCISE AT THIS LEVEL?: 30 MIN

## 2024-04-17 SDOH — ECONOMIC STABILITY: INCOME INSECURITY: IN THE LAST 12 MONTHS, WAS THERE A TIME WHEN YOU WERE NOT ABLE TO PAY THE MORTGAGE OR RENT ON TIME?: NO

## 2024-04-17 SDOH — ECONOMIC STABILITY: TRANSPORTATION INSECURITY
IN THE PAST 12 MONTHS, HAS THE LACK OF TRANSPORTATION KEPT YOU FROM MEDICAL APPOINTMENTS OR FROM GETTING MEDICATIONS?: NO

## 2024-04-17 SDOH — ECONOMIC STABILITY: FOOD INSECURITY: WITHIN THE PAST 12 MONTHS, YOU WORRIED THAT YOUR FOOD WOULD RUN OUT BEFORE YOU GOT MONEY TO BUY MORE.: NEVER TRUE

## 2024-04-17 SDOH — HEALTH STABILITY: PHYSICAL HEALTH: ON AVERAGE, HOW MANY DAYS PER WEEK DO YOU ENGAGE IN MODERATE TO STRENUOUS EXERCISE (LIKE A BRISK WALK)?: 4 DAYS

## 2024-04-17 SDOH — ECONOMIC STABILITY: TRANSPORTATION INSECURITY
IN THE PAST 12 MONTHS, HAS LACK OF TRANSPORTATION KEPT YOU FROM MEETINGS, WORK, OR FROM GETTING THINGS NEEDED FOR DAILY LIVING?: NO

## 2024-04-17 SDOH — ECONOMIC STABILITY: INCOME INSECURITY: HOW HARD IS IT FOR YOU TO PAY FOR THE VERY BASICS LIKE FOOD, HOUSING, MEDICAL CARE, AND HEATING?: NOT HARD AT ALL

## 2024-04-17 SDOH — HEALTH STABILITY: MENTAL HEALTH
STRESS IS WHEN SOMEONE FEELS TENSE, NERVOUS, ANXIOUS, OR CAN'T SLEEP AT NIGHT BECAUSE THEIR MIND IS TROUBLED. HOW STRESSED ARE YOU?: ONLY A LITTLE

## 2024-04-17 ASSESSMENT — SOCIAL DETERMINANTS OF HEALTH (SDOH)
HOW OFTEN DO YOU HAVE SIX OR MORE DRINKS ON ONE OCCASION: NEVER
HOW OFTEN DO YOU ATTENT MEETINGS OF THE CLUB OR ORGANIZATION YOU BELONG TO?: MORE THAN 4 TIMES PER YEAR
HOW OFTEN DO YOU ATTEND CHURCH OR RELIGIOUS SERVICES?: PATIENT DECLINED
HOW MANY DRINKS CONTAINING ALCOHOL DO YOU HAVE ON A TYPICAL DAY WHEN YOU ARE DRINKING: 1 OR 2
HOW OFTEN DO YOU ATTEND CHURCH OR RELIGIOUS SERVICES?: PATIENT DECLINED
IN A TYPICAL WEEK, HOW MANY TIMES DO YOU TALK ON THE PHONE WITH FAMILY, FRIENDS, OR NEIGHBORS?: MORE THAN THREE TIMES A WEEK
HOW OFTEN DO YOU GET TOGETHER WITH FRIENDS OR RELATIVES?: MORE THAN THREE TIMES A WEEK
HOW HARD IS IT FOR YOU TO PAY FOR THE VERY BASICS LIKE FOOD, HOUSING, MEDICAL CARE, AND HEATING?: NOT HARD AT ALL
HOW OFTEN DO YOU HAVE A DRINK CONTAINING ALCOHOL: 2-3 TIMES A WEEK
DO YOU BELONG TO ANY CLUBS OR ORGANIZATIONS SUCH AS CHURCH GROUPS UNIONS, FRATERNAL OR ATHLETIC GROUPS, OR SCHOOL GROUPS?: YES
IN A TYPICAL WEEK, HOW MANY TIMES DO YOU TALK ON THE PHONE WITH FAMILY, FRIENDS, OR NEIGHBORS?: MORE THAN THREE TIMES A WEEK
HOW OFTEN DO YOU ATTENT MEETINGS OF THE CLUB OR ORGANIZATION YOU BELONG TO?: MORE THAN 4 TIMES PER YEAR
HOW OFTEN DO YOU GET TOGETHER WITH FRIENDS OR RELATIVES?: MORE THAN THREE TIMES A WEEK
WITHIN THE PAST 12 MONTHS, YOU WORRIED THAT YOUR FOOD WOULD RUN OUT BEFORE YOU GOT THE MONEY TO BUY MORE: NEVER TRUE
DO YOU BELONG TO ANY CLUBS OR ORGANIZATIONS SUCH AS CHURCH GROUPS UNIONS, FRATERNAL OR ATHLETIC GROUPS, OR SCHOOL GROUPS?: YES

## 2024-04-17 ASSESSMENT — LIFESTYLE VARIABLES
HOW MANY STANDARD DRINKS CONTAINING ALCOHOL DO YOU HAVE ON A TYPICAL DAY: 1 OR 2
AUDIT-C TOTAL SCORE: 3
HOW OFTEN DO YOU HAVE A DRINK CONTAINING ALCOHOL: 2-3 TIMES A WEEK
HOW OFTEN DO YOU HAVE SIX OR MORE DRINKS ON ONE OCCASION: NEVER
SKIP TO QUESTIONS 9-10: 1

## 2024-04-17 ASSESSMENT — PATIENT HEALTH QUESTIONNAIRE - PHQ9: CLINICAL INTERPRETATION OF PHQ2 SCORE: 0

## 2024-04-17 ASSESSMENT — FIBROSIS 4 INDEX: FIB4 SCORE: 0.9

## 2024-04-17 ASSESSMENT — PAIN SCALES - GENERAL: PAINLEVEL: NO PAIN

## 2024-04-17 NOTE — PROGRESS NOTES
Chief Complaint   Patient presents with    Annual Exam    Medication Management     Medication update        HPI:  48 y.o. female for well exam  Sees dermatology.  Had lab work completed.  Needs medications refilled.    Hypothyroidism-stable on levothyroxine 125 mcg daily.    Transient insomnia-exam given 10 mg refill.  Rarely uses medication but in the summer has trouble sleeping in her trailer.    Vitamin D-lower end of normal.  States she has spurts of taking vitamin D supplement.  Recommend add 1000 IU daily.     from 96.    Episodic low back pain/muscle spasm-May need to take muscle relaxer or NSAID for symptoms.  Previously given Celebrex which she tolerated but with last 2 manufacturers of medication she has had a reaction with hives and itchiness.  Does not use medication often.  She is able to take ibuprofen and naproxen over-the-counter without issues.  Occasionally will need to use Medrol Dosepak for her back.  Had not used Medrol Dosepak for years prior.  Interested in possible dry needling.    Stress incontinence of urine.  Notes leaking bladder with running/exercise or sneezing.  Doing Kegel exercises.  Does not desire to take medications.      Lifestyle:  Diet: healthy  Exercise/Activities: regular  Stressors: varies, overall good  Social Support: good  Work:     Health Maintenance:  Last pap: 10/2022-sees gynecology.  Does not have regular periods as she has IUD.  No LMP recorded. Patient has had an implant.  Immunizations: see below  Mammogram: 11/2022  Colonoscopy: 2023    Health Maintenance Due   Topic Date Due    Polio Vaccine (Inactivated Polio) (3 of 3 - 4-dose series) 08/04/2000    Hepatitis B Vaccine (Hep B) (2 of 3 - 19+ 3-dose series) 10/10/2000    COVID-19 Vaccine (3 - 2023-24 season) 09/01/2023       Immunization History   Administered Date(s) Administered    ANTHRAX VACCINE-BioThrax - HISTORICAL DATA 03/12/2007, 03/30/2007, 04/13/2007, 08/03/2009    DTP - Historical  vaccine 07/23/1982    Hepatitis A Vaccine, Adult 04/28/1998, 09/12/2000    Hepatitis B Vaccine (Adol/Adult) 04/28/1998, 06/01/1998, 06/11/1998, 09/12/2000    Hepatitis B Vaccine Adolescent/Pediatric 04/28/1998, 06/01/1998, 09/12/2000    Influenza (IM) Preservative Free - HISTORICAL DATA 09/29/2012, 10/18/2013, 12/09/2018, 11/21/2019    Influenza LAIV (Nasal) - HISTORICAL DATA 01/28/2005, 10/27/2008    Influenza Seasonal Injectable - Historical Data 09/29/2011, 10/24/2014, 11/04/2015, 10/10/2016    Influenza Vac Subunit Quad Inj (Pf) 11/12/2021, 12/02/2022, 11/20/2023    Influenza Vaccine Adult HD 02/11/2000, 12/15/2000, 11/27/2001, 11/26/2002, 11/06/2003, 11/13/2009    Influenza Vaccine Pediatric Split - Historical Data 11/16/2005, 11/09/2006, 02/05/2008, 10/07/2009, 12/08/2010    Influenza Vaccine Quad Inj (Pf) 10/25/2017, 11/15/2018, 10/23/2020    Influenza Vaccine Quad Inj (Preserved) 10/23/2020    Influenza, Unspecified - HISTORICAL DATA 11/01/2023    Mayelin SARS-CoV-2 Vaccine 03/18/2021, 11/30/2021    MMR Vaccine 01/24/1994    Meningococcal Conjugate Vaccine MCV4 (MENVEO) 01/08/2001    OPV TRIVALENT - HISTORICAL DATA (GIVEN PRIOR TO MAY 2016) 07/23/1982, 02/04/2000    Smallpox Vaccine - HISTORICAL DATA 09/06/2006    TD Vaccine 04/28/1998, 08/28/2018    Tdap Vaccine 04/28/1998, 05/12/2008, 08/28/2018    Tuberculin Skin Test 04/28/1998, 01/08/2001, 02/05/2008    Typhoid Vaccine 01/08/2001    Typhus - HISTORICAL DATA 09/06/2006, 08/03/2009    Yellow Fever Vaccine 01/11/2001         Review of Systems   Constitutional: Negative for fever, chills and malaise/fatigue.   HENT: Negative for congestion, sore throat, or swallowing issues.   Eyes: Negative for pain or vision changes.   Respiratory: Negative for cough and shortness of breath.    Cardiovascular: Negative for leg swelling. No chest pain.   Gastrointestinal: Negative for nausea, vomiting, abdominal pain and diarrhea.   Genitourinary: Negative for dysuria and  hematuria.   Skin: Negative for rash.   Neurological: Negative for dizziness, focal weakness and headaches.   Endo/Heme/Allergies: Does not bruise/bleed easily.   Psychiatric/Behavioral: Negative for depression.  The patient is not nervous/anxious.          Current Outpatient Medications:     levothyroxine (SYNTHROID) 125 MCG Tab, TAKE 1 TABLET EVERY MORNING ON AN EMPTY STOMACH, Disp: 90 Tablet, Rfl: 0    methocarbamol (ROBAXIN) 500 MG Tab, Take 1 to 2 tablets every 6 hours as needed for pain/muscle spasms., Disp: 30 Tablet, Rfl: 0    RHOFADE 1 % Cream, Apply 1 Drop topically 1 time a day as needed., Disp: , Rfl:     Cholecalciferol (VITAMIN D) 2000 UNIT Tab, Take  by mouth every day., Disp: , Rfl:     celecoxib (CELEBREX) 200 MG Cap, Take 1 Capsule by mouth every day. (Patient not taking: Reported on 4/17/2024), Disp: 90 Capsule, Rfl: 0    cyclobenzaprine (FLEXERIL) 10 mg Tab, Take 1 Tablet by mouth 3 times a day as needed. (Patient not taking: Reported on 4/17/2024), Disp: 30 Tablet, Rfl: 1    methylPREDNISolone (MEDROL DOSEPAK) 4 MG Tablet Therapy Pack, As directed on the packaging label. (Patient not taking: Reported on 10/25/2023), Disp: 21 Tab, Rfl: 0    Allergies   Allergen Reactions    Celebrex [Celecoxib] Diarrhea, Rash and Nausea     GI upset       Patient Active Problem List   Diagnosis    Weight gain    IUD (intrauterine device) in place    Psoriasis    Episodic low back pain    Leukocytosis    Seasonal allergic rhinitis    Hypothyroidism    Family history of ovarian cancer    Bulging of thoracic intervertebral disc    DDD (degenerative disc disease), lumbar       Past Medical History:   Diagnosis Date    Abscess of right breast 3/16/2018    Allergy     Seasonal    Bulging of thoracic intervertebral disc     Hypothyroidism        No past surgical history on file.    Family History   Problem Relation Age of Onset    Ovarian Cancer Mother 65    Heart Disease Mother     Stroke Mother         a fib     "Hypertension Mother     Cancer Father         lung, skin- precancerous    Lung Disease Father     Hypertension Brother        Social History     Tobacco Use    Smoking status: Never    Smokeless tobacco: Never   Vaping Use    Vaping Use: Never used   Substance Use Topics    Alcohol use: Yes     Comment: occasionally     Drug use: No       PHYSICAL EXAM:  /70 (BP Location: Left arm, Patient Position: Sitting, BP Cuff Size: Adult)   Pulse 72   Temp 36.7 °C (98.1 °F) (Temporal)   Resp 16   Ht 1.702 m (5' 7\")   Wt 78.5 kg (173 lb)   SpO2 98%   BMI 27.10 kg/m²   Constitutional: She appears well-developed and well-nourished. She appears not diaphoretic. No distress.   HENT: Right Ear: External ear normal. Left Ear: External ear normal. Tympanic membranes clear and intact.   Nose: Nose normal.   Mouth/Throat: Oropharynx is clear and moist. No oropharyngeal exudate.     Eyes: Conjunctivae and extraocular motions are normal. Pupils are equal, round, and reactive to light. No scleral icterus.   Neck: Normal range of motion. Neck supple. No thyromegaly present.   Cardiovascular: Normal rate, regular rhythm, normal heart sounds and intact distal pulses.  Exam reveals no gallop and no friction rub.  No murmur heard.   Pulmonary/Chest: Effort normal and breath sounds normal. No respiratory distress. She has no wheezes. She has no rales.   Abdominal: Soft. Bowel sounds are normal. She exhibits no distension and no mass. No tenderness. She has no rebound and no guarding.   Lymphadenopathy:  She has no cervical adenopathy.   Neurological: She is alert. She has normal reflexes. No cranial nerve deficit. She exhibits normal muscle tone.   Skin: Skin is warm and dry. No rash noted. She is not diaphoretic. No erythema.   Psychiatric: She has a normal mood and affect. Her behavior is normal.   Musculoskeletal: She exhibits no edema. Full strength throughout. 2+ DTR throughout.     Labs/Imaging:      Latest Reference Range & " Units 03/08/24 06:19   WBC 4.8 - 10.8 K/uL 6.8   RBC 4.20 - 5.40 M/uL 4.56   Hemoglobin 12.0 - 16.0 g/dL 14.2   Hematocrit 37.0 - 47.0 % 41.2   MCV 81.4 - 97.8 fL 90.4   MCH 27.0 - 33.0 pg 31.1   MCHC 32.2 - 35.5 g/dL 34.5   RDW 35.9 - 50.0 fL 41.4   Platelet Count 164 - 446 K/uL 325   MPV 9.0 - 12.9 fL 9.5   Neutrophils-Polys 44.00 - 72.00 % 46.60   Neutrophils (Absolute) 1.82 - 7.42 K/uL 3.16   Lymphocytes 22.00 - 41.00 % 37.60   Lymphs (Absolute) 1.00 - 4.80 K/uL 2.56   Monocytes 0.00 - 13.40 % 10.10   Monos (Absolute) 0.00 - 0.85 K/uL 0.69   Eosinophils 0.00 - 6.90 % 4.60   Eos (Absolute) 0.00 - 0.51 K/uL 0.31   Basophils 0.00 - 1.80 % 0.70   Baso (Absolute) 0.00 - 0.12 K/uL 0.05   Immature Granulocytes 0.00 - 0.90 % 0.40   Immature Granulocytes (abs) 0.00 - 0.11 K/uL 0.03   Nucleated RBC 0.00 - 0.20 /100 WBC 0.00   NRBC (Absolute) K/uL 0.00   Sodium 135 - 145 mmol/L 139   Potassium 3.6 - 5.5 mmol/L 4.2   Chloride 96 - 112 mmol/L 104   Co2 20 - 33 mmol/L 25   Anion Gap 7.0 - 16.0  10.0   Glucose 65 - 99 mg/dL 89   Bun 8 - 22 mg/dL 13   Creatinine 0.50 - 1.40 mg/dL 0.70   GFR (CKD-EPI) >60 mL/min/1.73 m 2 107   Calcium 8.5 - 10.5 mg/dL 9.2   Correct Calcium 8.5 - 10.5 mg/dL 8.8   AST(SGOT) 12 - 45 U/L 22   ALT(SGPT) 2 - 50 U/L 12   Alkaline Phosphatase 30 - 99 U/L 64   Total Bilirubin 0.1 - 1.5 mg/dL 0.8   Albumin 3.2 - 4.9 g/dL 4.5   Total Protein 6.0 - 8.2 g/dL 7.1   Globulin 1.9 - 3.5 g/dL 2.6   A-G Ratio g/dL 1.7   Glycohemoglobin 4.0 - 5.6 % 5.4   Estim. Avg Glu mg/dL 108   Cholesterol,Tot 100 - 199 mg/dL 198   Triglycerides 0 - 149 mg/dL 61   HDL >=40 mg/dL 72   LDL <100 mg/dL 114 (H)   25-Hydroxy   Vitamin D 25 30 - 100 ng/mL 33   TSH 0.380 - 5.330 uIU/mL 2.940   (H): Data is abnormally high      ASSESSMENT/PLAN:    This is a 48 y.o. female for well exam.     1. Well adult exam   Continue healthy diet and routine exercise.  Recommend additional vitamin D 1000 IU daily.  Plan for routine annual lab work in  future.       2. Hypothyroidism, unspecified type -stable.  Continue levothyroxine 1 2 5 mcg daily. levothyroxine (SYNTHROID) 125 MCG Tab      3. Transient insomnia -stable.    Limit use as needed. zolpidem (AMBIEN) 10 MG Tab      4. Episodic low back pain -prior side effects of generic celecoxib, last 2 manufactures.  Has tolerated naproxen previously, medication prescribed.   Limit use of medications as needed.  Reviewed potential side effects medication.  Follow-up with physical therapy. methylPREDNISolone (MEDROL DOSEPAK) 4 MG Tablet Therapy Pack    Referral to Physical Therapy    naproxen (NAPROSYN) 500 MG Tab      5. Muscle spasm  methocarbamol (ROBAXIN) 500 MG Tab    Referral to Physical Therapy      6. DDD (degenerative disc disease), lumbar  Referral to Physical Therapy      7. Bulging of thoracic intervertebral disc  Referral to Physical Therapy      8. Stress incontinence -does not desire medication therapy.  Discussed possible options of therapy.  Continue Kegel exercises.  She may consider pelvic floor therapy or other electrical stimulation therapy.  She will plan to discuss further with physical therapist at this time.  Patient may notify office if needs further referral.       9. Encounter for screening mammogram for malignant neoplasm of breast  MA-SCREENING MAMMO BILAT W/TOMOSYNTHESIS W/CAD      Reviewed potential side effect of medication.  Reviewed healthcare maintenance and immunization recommendations.    Recommend healthy diet and routine exercise.  Recommend routine annual well visit.   Recommend regular use of seatbelt and home monitors- for smoke and carbon monoxide. Recommend routine dental visits.       Return in about 1 year (around 4/17/2025).  Follow-up sooner as needed.      This medical record contains text that has been entered with the assistance of computer voice recognition and dictation software.  Therefore, it may contain unintended errors in text, spelling, punctuation, or  grammar.

## 2024-05-01 ENCOUNTER — APPOINTMENT (OUTPATIENT)
Dept: RADIOLOGY | Facility: MEDICAL CENTER | Age: 49
End: 2024-05-01
Attending: FAMILY MEDICINE
Payer: OTHER GOVERNMENT

## 2024-05-01 DIAGNOSIS — Z12.31 ENCOUNTER FOR SCREENING MAMMOGRAM FOR MALIGNANT NEOPLASM OF BREAST: ICD-10-CM

## 2025-01-06 ENCOUNTER — APPOINTMENT (OUTPATIENT)
Dept: URBAN - METROPOLITAN AREA CLINIC 35 | Facility: CLINIC | Age: 50
Setting detail: DERMATOLOGY
End: 2025-01-06

## 2025-01-06 DIAGNOSIS — H02.72 MADAROSIS OF EYELID AND PERIOCULAR AREA: ICD-10-CM

## 2025-01-06 DIAGNOSIS — Z71.89 OTHER SPECIFIED COUNSELING: ICD-10-CM

## 2025-01-06 DIAGNOSIS — D18.0 HEMANGIOMA: ICD-10-CM

## 2025-01-06 DIAGNOSIS — L82.1 OTHER SEBORRHEIC KERATOSIS: ICD-10-CM

## 2025-01-06 DIAGNOSIS — L71.8 OTHER ROSACEA: ICD-10-CM

## 2025-01-06 DIAGNOSIS — L81.4 OTHER MELANIN HYPERPIGMENTATION: ICD-10-CM

## 2025-01-06 DIAGNOSIS — D22 MELANOCYTIC NEVI: ICD-10-CM

## 2025-01-06 PROBLEM — D22.5 MELANOCYTIC NEVI OF TRUNK: Status: ACTIVE | Noted: 2025-01-06

## 2025-01-06 PROBLEM — H02.729 MADAROSIS OF UNSPECIFIED EYE, UNSPECIFIED EYELID AND PERIOCULAR AREA: Status: ACTIVE | Noted: 2025-01-06

## 2025-01-06 PROBLEM — D18.01 HEMANGIOMA OF SKIN AND SUBCUTANEOUS TISSUE: Status: ACTIVE | Noted: 2025-01-06

## 2025-01-06 PROCEDURE — 99214 OFFICE O/P EST MOD 30 MIN: CPT

## 2025-01-06 PROCEDURE — ? TREATMENT REGIMEN

## 2025-01-06 PROCEDURE — ? COUNSELING

## 2025-01-06 PROCEDURE — ? LATISSE COUNSELING

## 2025-01-06 PROCEDURE — ? SUNSCREEN RECOMMENDATIONS

## 2025-01-06 PROCEDURE — ? PRESCRIPTION

## 2025-01-06 RX ORDER — BIMATOPROST 0.3 MG/ML
SOLUTION/ DROPS OPHTHALMIC
Qty: 5 | Refills: 11

## 2025-01-06 RX ORDER — DOXYCYCLINE HYCLATE 50 MG/1
1 CAPSULE, GELATIN COATED ORAL DAILY
Qty: 90 | Refills: 3 | Status: ERX

## 2025-01-06 RX ORDER — BIMATOPROST 0.3 MG/ML
1 SOLUTION/ DROPS OPHTHALMIC
Qty: 5 | Refills: 11 | COMMUNITY
Start: 2025-01-06

## 2025-01-06 RX ADMIN — BIMATOPROST 1: 0.3 SOLUTION/ DROPS OPHTHALMIC at 00:00

## 2025-01-06 ASSESSMENT — LOCATION ZONE DERM
LOCATION ZONE: FACE
LOCATION ZONE: TRUNK

## 2025-01-06 ASSESSMENT — LOCATION SIMPLE DESCRIPTION DERM
LOCATION SIMPLE: CHEST
LOCATION SIMPLE: LEFT UPPER BACK
LOCATION SIMPLE: LEFT CHEEK
LOCATION SIMPLE: RIGHT UPPER BACK

## 2025-01-06 ASSESSMENT — LOCATION DETAILED DESCRIPTION DERM
LOCATION DETAILED: LEFT SUPERIOR UPPER BACK
LOCATION DETAILED: LEFT INFERIOR CENTRAL MALAR CHEEK
LOCATION DETAILED: LEFT MID-UPPER BACK
LOCATION DETAILED: RIGHT SUPERIOR UPPER BACK
LOCATION DETAILED: RIGHT MEDIAL SUPERIOR CHEST

## 2025-01-06 NOTE — PROCEDURE: TREATMENT REGIMEN
Continue Regimen: - Soolantra 1 % topical cream Apply to facial skin once daily\\n\\n- doxycycline hyclate 50 mg capsule. Take one pill once daily with food\\n\\n- Rhofade 1 % topical cream. Apply once a day\\n\\n-  SulfaCleanse 8-4 8 %-4 % topical suspension. Cleanse face and ears once daily.
Discontinue Regimen: - Metronidazole 0.75% lotion
Plan: - 1/6/25 pt states diet is correlated with rosacea worsening. Pt reports rosacea being well controlled prior to holidays. Sent doxy refill today.
Detail Level: Simple

## 2025-01-10 ENCOUNTER — HOSPITAL ENCOUNTER (OUTPATIENT)
Dept: LAB | Facility: MEDICAL CENTER | Age: 50
End: 2025-01-10
Attending: FAMILY MEDICINE
Payer: OTHER GOVERNMENT

## 2025-01-10 DIAGNOSIS — F51.02 TRANSIENT INSOMNIA: ICD-10-CM

## 2025-01-10 DIAGNOSIS — E03.9 HYPOTHYROIDISM, UNSPECIFIED TYPE: ICD-10-CM

## 2025-01-10 DIAGNOSIS — R63.5 WEIGHT GAIN: ICD-10-CM

## 2025-01-10 LAB
ESTRADIOL SERPL-MCNC: 33.6 PG/ML
FSH SERPL-ACNC: 20 MIU/ML
LH SERPL-ACNC: 12.4 IU/L
PROGEST SERPL-MCNC: 0.93 NG/ML

## 2025-01-10 PROCEDURE — 84144 ASSAY OF PROGESTERONE: CPT

## 2025-01-10 PROCEDURE — 83001 ASSAY OF GONADOTROPIN (FSH): CPT

## 2025-01-10 PROCEDURE — 83002 ASSAY OF GONADOTROPIN (LH): CPT

## 2025-01-10 PROCEDURE — 84403 ASSAY OF TOTAL TESTOSTERONE: CPT

## 2025-01-10 PROCEDURE — 84402 ASSAY OF FREE TESTOSTERONE: CPT

## 2025-01-10 PROCEDURE — 84270 ASSAY OF SEX HORMONE GLOBUL: CPT

## 2025-01-10 PROCEDURE — 36415 COLL VENOUS BLD VENIPUNCTURE: CPT

## 2025-01-10 PROCEDURE — 82670 ASSAY OF TOTAL ESTRADIOL: CPT

## 2025-01-10 PROCEDURE — 82166 ASSAY ANTI-MULLERIAN HORM: CPT

## 2025-01-14 LAB
MIS SERPL-MCNC: 0.51 NG/ML
SHBG SERPL-SCNC: 82 NMOL/L (ref 25–122)
TESTOST FREE SERPL-MCNC: 1.3 PG/ML (ref 1.1–5.8)
TESTOST SERPL-MCNC: 14 NG/DL (ref 9–55)

## 2025-04-18 ENCOUNTER — APPOINTMENT (OUTPATIENT)
Dept: MEDICAL GROUP | Facility: IMAGING CENTER | Age: 50
End: 2025-04-18
Payer: OTHER GOVERNMENT

## 2025-04-23 ENCOUNTER — APPOINTMENT (OUTPATIENT)
Dept: MEDICAL GROUP | Facility: IMAGING CENTER | Age: 50
End: 2025-04-23
Payer: OTHER GOVERNMENT

## 2025-04-23 VITALS
HEART RATE: 64 BPM | DIASTOLIC BLOOD PRESSURE: 64 MMHG | SYSTOLIC BLOOD PRESSURE: 116 MMHG | OXYGEN SATURATION: 97 % | TEMPERATURE: 97 F | BODY MASS INDEX: 26.46 KG/M2 | HEIGHT: 67 IN | RESPIRATION RATE: 16 BRPM | WEIGHT: 168.6 LBS

## 2025-04-23 DIAGNOSIS — Z12.31 ENCOUNTER FOR SCREENING MAMMOGRAM FOR MALIGNANT NEOPLASM OF BREAST: ICD-10-CM

## 2025-04-23 DIAGNOSIS — Z97.5 PRESENCE OF INTRAUTERINE CONTRACEPTIVE DEVICE: ICD-10-CM

## 2025-04-23 DIAGNOSIS — Z86.39 HISTORY OF VITAMIN D DEFICIENCY: ICD-10-CM

## 2025-04-23 DIAGNOSIS — Z00.00 WELL ADULT EXAM: ICD-10-CM

## 2025-04-23 DIAGNOSIS — M54.50 EPISODIC LOW BACK PAIN: ICD-10-CM

## 2025-04-23 DIAGNOSIS — Z01.84 IMMUNITY STATUS TESTING: ICD-10-CM

## 2025-04-23 PROCEDURE — 3078F DIAST BP <80 MM HG: CPT | Performed by: FAMILY MEDICINE

## 2025-04-23 PROCEDURE — 3074F SYST BP LT 130 MM HG: CPT | Performed by: FAMILY MEDICINE

## 2025-04-23 PROCEDURE — 99396 PREV VISIT EST AGE 40-64: CPT | Performed by: FAMILY MEDICINE

## 2025-04-23 RX ORDER — METHYLPREDNISOLONE 4 MG/1
TABLET ORAL
Qty: 21 TABLET | Refills: 0 | Status: SHIPPED | OUTPATIENT
Start: 2025-04-23

## 2025-04-23 RX ORDER — LEVOTHYROXINE SODIUM 125 UG/1
125 TABLET ORAL
Qty: 90 TABLET | Refills: 3 | Status: SHIPPED | OUTPATIENT
Start: 2025-04-23

## 2025-04-23 RX ORDER — CYCLOBENZAPRINE HCL 10 MG
10 TABLET ORAL 3 TIMES DAILY PRN
Qty: 30 TABLET | Refills: 1 | Status: SHIPPED | OUTPATIENT
Start: 2025-04-23

## 2025-04-23 ASSESSMENT — FIBROSIS 4 INDEX: FIB4 SCORE: 0.92

## 2025-04-23 NOTE — PROGRESS NOTES
Chief Complaint   Patient presents with    Annual Exam     Labs       HPI:  49 y.o. female for well exam.  Specialists: back specialist, dermatology-annually  Routine dental and eye exams.   Routine use of seatbelt/sunscreen/home monitors reviewed.     LDL-previously slight elevated.   History of vitamin D deficiency in past.   Mirena- IUD, sees gynecology. Griffin had spotting.     Back goes out about every 6 months.   Had new imaging. Saw back specialist.   Uses medrol dose pack as needed, had discussed using eery 6 months as needed.  Will see back specialist in future. Triggers points help.   Flexeril helps. Robaxin- has available.  Needs medication refilled.     Lifestyle:  Diet: healthy diet  Exercise/Activities: regular exercise, going gym  Stressors: high levels lately- a lot going on lately.   Social Support: good support    Health Maintenance:  Last pap: 2022- negative, hpv negative.   No LMP recorded. Patient has had an implant.  Immunizations: see below. Hep B vaccine- in .   Mammogram: 2024  Colonoscopy: 2023  Dexa: consider early screening in future, hx of corticosteroid use.     Health Maintenance Due   Topic Date Due    Polio Vaccine (Inactivated Polio) (3 of 3 - 4-dose series) 08/04/2000    Hepatitis B Vaccine (Hep B) (2 of 3 - 19+ 3-dose series) 10/10/2000    COVID-19 Vaccine (3 - 2024-25 season) 09/01/2024    Mammogram  05/01/2025       Immunization History   Administered Date(s) Administered    ANTHRAX VACCINE-BioThrax - HISTORICAL DATA 03/12/2007, 03/30/2007, 04/13/2007, 08/03/2009    DTP - Historical vaccine 07/23/1982    Hepatitis A Vaccine, Adult 04/28/1998, 09/12/2000    Hepatitis B Vaccine (Adol/Adult) 04/28/1998, 06/01/1998, 06/11/1998, 09/12/2000    Hepatitis B Vaccine Adolescent/Pediatric 04/28/1998, 06/01/1998, 09/12/2000    Influenza Seasonal Injectable - Historical Data 09/29/2011, 10/24/2014, 11/04/2015, 10/10/2016    Influenza Vac Subunit Quad Inj (Pf) 11/12/2021, 12/02/2022,  11/20/2023    Influenza Vaccine Adult HD 02/11/2000, 12/15/2000, 11/27/2001, 11/26/2002, 11/06/2003, 11/13/2009    Influenza Vaccine Pediatric Split - Historical Data 11/16/2005, 11/09/2006, 02/05/2008, 10/07/2009, 12/08/2010    Influenza Vaccine Quad Inj (Pf) 10/25/2017, 11/15/2018, 10/23/2020    Influenza Vaccine Quad Inj (Preserved) 10/23/2020    Influenza split virus trivalent (PF) 09/29/2012, 10/18/2013, 12/09/2018, 11/21/2019    Influenza, live, trivalent, intranasal 01/28/2005, 10/27/2008    Influenza, unspecified formulation 11/01/2023    Mayelin SARS-CoV-2 Vaccine 03/18/2021, 11/30/2021    MMR Vaccine 01/24/1994    Meningococcal Conjugate Vaccine MCV4 (MENVEO) 01/08/2001    OPV TRIVALENT - HISTORICAL DATA (GIVEN PRIOR TO MAY 2016) 07/23/1982, 02/04/2000    Smallpox Vaccine - HISTORICAL DATA 09/06/2006    TD Vaccine 04/28/1998, 08/28/2018    Tdap Vaccine 04/28/1998, 05/12/2008, 08/28/2018    Tuberculin Skin Test 04/28/1998, 01/08/2001, 02/05/2008    Typhoid Vaccine 01/08/2001    Typhus - HISTORICAL DATA 09/06/2006, 08/03/2009    Yellow Fever Vaccine 01/11/2001       Review of Systems:  Constitutional: Negative for fever, chills and malaise/fatigue.   HENT: Negative for congestion, sore throat, or swallowing issues.   Eyes: Negative for pain or vision changes.   Respiratory: Negative for cough and shortness of breath.    Cardiovascular: Negative for leg swelling. No chest pain.   Gastrointestinal: Negative for nausea, vomiting, abdominal pain and diarrhea.   Genitourinary: Negative for dysuria and hematuria.   Skin: Negative for rash.   Neurological: Negative for dizziness, focal weakness and headaches.   Endo/Heme/Allergies: Does not bruise/bleed easily.   Psychiatric/Behavioral: Negative for depression.  The patient is not nervous/anxious.        Current Outpatient Medications:     levothyroxine (SYNTHROID) 125 MCG Tab, Take 1 Tablet by mouth every morning on an empty stomach., Disp: 90 Tablet, Rfl: 3     cyclobenzaprine (FLEXERIL) 10 MG Tab, Take 1 Tablet by mouth 3 times a day as needed for Muscle Spasms., Disp: 30 Tablet, Rfl: 1    methylPREDNISolone (MEDROL DOSEPAK) 4 MG Tablet Therapy Pack, As directed on the packaging label., Disp: 21 Tablet, Rfl: 0    methocarbamol (ROBAXIN) 500 MG Tab, Take 1 to 2 tablets every 6 hours as needed for pain/muscle spasms., Disp: 30 Tablet, Rfl: 3    naproxen (NAPROSYN) 500 MG Tab, TAKE 1 TABLET TWICE A DAY WITH MEALS FOR 5 DAYS AS NEEDED FOR BACK PAIN, Disp: 60 Tablet, Rfl: 3    levothyroxine (SYNTHROID) 125 MCG Tab, Take 1 Tablet by mouth every morning on an empty stomach., Disp: 10 Tablet, Rfl: 0    RHOFADE 1 % Cream, Apply 1 Drop topically 1 time a day as needed., Disp: , Rfl:     Cholecalciferol (VITAMIN D) 2000 UNIT Tab, Take  by mouth every day., Disp: , Rfl:     methylPREDNISolone (MEDROL DOSEPAK) 4 MG Tablet Therapy Pack, Follow schedule on package instructions. (Patient not taking: Reported on 4/23/2025), Disp: 21 Tablet, Rfl: 0    Allergies   Allergen Reactions    Celebrex [Celecoxib] Diarrhea, Rash and Nausea     GI upset       Patient Active Problem List   Diagnosis    Weight gain    IUD (intrauterine device) in place    Psoriasis    Episodic low back pain    Seasonal allergic rhinitis    Hypothyroidism    Family history of ovarian cancer    Bulging of thoracic intervertebral disc    DDD (degenerative disc disease), lumbar       Past Medical History:   Diagnosis Date    Abscess of right breast 3/16/2018    Allergy     Seasonal    Bulging of thoracic intervertebral disc     Hypothyroidism        No past surgical history on file.    Family History   Problem Relation Age of Onset    Ovarian Cancer Mother 65    Heart Disease Mother     Stroke Mother         a fib    Hypertension Mother     Cancer Father         lung, skin- precancerous    Lung Disease Father     Hypertension Brother        Social History     Tobacco Use    Smoking status: Never    Smokeless tobacco:  "Never   Vaping Use    Vaping status: Never Used   Substance Use Topics    Alcohol use: Yes     Comment: occasionally     Drug use: No         PHYSICAL EXAM:  /64 (BP Location: Left arm, Patient Position: Sitting, BP Cuff Size: Adult)   Pulse 64   Temp 36.1 °C (97 °F) (Temporal)   Resp 16   Ht 1.689 m (5' 6.5\")   Wt 76.5 kg (168 lb 9.6 oz)   SpO2 97%   BMI 26.81 kg/m²   Constitutional: She appears well-developed and well-nourished. She appears not diaphoretic. No distress.   HENT: Right Ear: External ear normal. Left Ear: External ear normal. Tympanic membranes clear and intact.   Nose: Nose normal.   Mouth/Throat: Oropharynx is clear and moist. No oropharyngeal exudate.     Eyes: Conjunctivae and extraocular motions are normal. Pupils are equal, round, and reactive to light. No scleral icterus.   Neck: Normal range of motion. Neck supple. No thyromegaly present.   Cardiovascular: Normal rate, regular rhythm, normal heart sounds and intact distal pulses.  Exam reveals no gallop and no friction rub.  No murmur heard.   Pulmonary/Chest: Effort normal and breath sounds normal. No respiratory distress. She has no wheezes. She has no rales.   Abdominal: Soft. Bowel sounds are normal. She exhibits no distension and no mass. No tenderness. She has no rebound and no guarding.   Lymphadenopathy:  She has no cervical adenopathy.   Neurological: She is alert. She has normal reflexes. No cranial nerve deficit. She exhibits normal muscle tone.   Skin: Skin is warm and dry. No rash noted. She is not diaphoretic. No erythema.   Psychiatric: She has a normal mood and affect. Her behavior is normal.   Musculoskeletal: She exhibits no edema. Full strength throughout. 2+ DTR throughout.         ASSESSMENT/PLAN:    This is a 49 y.o. female for well exam.     1. Well adult exam  HEP B SURFACE AB    HEP B SURFACE ANTIGEN    CBC WITH DIFFERENTIAL    Comp Metabolic Panel    Lipid Profile    HEMOGLOBIN A1C    TSH WITH REFLEX TO " FT4    VITAMIN D,25 HYDROXY (DEFICIENCY)      2. IUD (intrauterine device) in place        3. History of vitamin D deficiency        4. Episodic low back pain -stable.   Continue follow up with specialist.   Reviewed limiting use of medication as needed.   Reviewed avoid certain use of medications together.   Reviewed precautions and potential side effects of medication therapy.   Reviewed precautions and recommendations of conditioning exercise.  cyclobenzaprine (FLEXERIL) 10 MG Tab    methylPREDNISolone (MEDROL DOSEPAK) 4 MG Tablet Therapy Pack      5. Immunity status testing  HEP B SURFACE AB    HEP B SURFACE ANTIGEN      6. Encounter for screening mammogram for malignant neoplasm of breast  MA-SCREENING MAMMO BILAT W/TOMOSYNTHESIS W/CAD      Reviewed healthcare maintenance and immunization recommendations.    Recommend healthy diet and routine exercise.    Recommend routine annual well visit.   Recommend regular use of seatbelt and home monitors- for smoke and carbon monoxide. Recommend routine dental visits.   Recommend routine gynecology visits and pap smear.     Follow up after labs a needed.   Return in about 1 year (around 4/23/2026).      This medical record contains text that has been entered with the assistance of computer voice recognition and dictation software.  Therefore, it may contain unintended errors in text, spelling, punctuation, or grammar.

## 2025-05-22 ENCOUNTER — HOSPITAL ENCOUNTER (OUTPATIENT)
Dept: RADIOLOGY | Facility: MEDICAL CENTER | Age: 50
End: 2025-05-22
Attending: FAMILY MEDICINE
Payer: COMMERCIAL

## 2025-05-22 DIAGNOSIS — Z12.31 ENCOUNTER FOR SCREENING MAMMOGRAM FOR MALIGNANT NEOPLASM OF BREAST: ICD-10-CM

## 2025-05-22 PROCEDURE — 77067 SCR MAMMO BI INCL CAD: CPT

## 2025-05-28 ENCOUNTER — RESULTS FOLLOW-UP (OUTPATIENT)
Dept: MEDICAL GROUP | Facility: IMAGING CENTER | Age: 50
End: 2025-05-28

## 2025-05-30 ENCOUNTER — HOSPITAL ENCOUNTER (OUTPATIENT)
Dept: LAB | Facility: MEDICAL CENTER | Age: 50
End: 2025-05-30
Attending: FAMILY MEDICINE
Payer: COMMERCIAL

## 2025-05-30 DIAGNOSIS — Z01.84 IMMUNITY STATUS TESTING: ICD-10-CM

## 2025-05-30 DIAGNOSIS — Z00.00 WELL ADULT EXAM: ICD-10-CM

## 2025-05-30 LAB
25(OH)D3 SERPL-MCNC: 67 NG/ML (ref 30–100)
ALBUMIN SERPL BCP-MCNC: 4.4 G/DL (ref 3.2–4.9)
ALBUMIN/GLOB SERPL: 1.7 G/DL
ALP SERPL-CCNC: 69 U/L (ref 30–99)
ALT SERPL-CCNC: 13 U/L (ref 2–50)
ANION GAP SERPL CALC-SCNC: 10 MMOL/L (ref 7–16)
AST SERPL-CCNC: 21 U/L (ref 12–45)
BASOPHILS # BLD AUTO: 0.8 % (ref 0–1.8)
BASOPHILS # BLD: 0.06 K/UL (ref 0–0.12)
BILIRUB SERPL-MCNC: 0.7 MG/DL (ref 0.1–1.5)
BUN SERPL-MCNC: 13 MG/DL (ref 8–22)
CALCIUM ALBUM COR SERPL-MCNC: 8.7 MG/DL (ref 8.5–10.5)
CALCIUM SERPL-MCNC: 9 MG/DL (ref 8.5–10.5)
CHLORIDE SERPL-SCNC: 104 MMOL/L (ref 96–112)
CHOLEST SERPL-MCNC: 191 MG/DL (ref 100–199)
CO2 SERPL-SCNC: 23 MMOL/L (ref 20–33)
CREAT SERPL-MCNC: 0.85 MG/DL (ref 0.5–1.4)
EOSINOPHIL # BLD AUTO: 0.29 K/UL (ref 0–0.51)
EOSINOPHIL NFR BLD: 3.9 % (ref 0–6.9)
ERYTHROCYTE [DISTWIDTH] IN BLOOD BY AUTOMATED COUNT: 40.8 FL (ref 35.9–50)
EST. AVERAGE GLUCOSE BLD GHB EST-MCNC: 105 MG/DL
GFR SERPLBLD CREATININE-BSD FMLA CKD-EPI: 84 ML/MIN/1.73 M 2
GLOBULIN SER CALC-MCNC: 2.6 G/DL (ref 1.9–3.5)
GLUCOSE SERPL-MCNC: 91 MG/DL (ref 65–99)
HBA1C MFR BLD: 5.3 % (ref 4–5.6)
HBV SURFACE AB SERPL IA-ACNC: 363 MIU/ML (ref 0–10)
HBV SURFACE AG SER QL: NORMAL
HCT VFR BLD AUTO: 40.6 % (ref 37–47)
HDLC SERPL-MCNC: 75 MG/DL
HGB BLD-MCNC: 14.1 G/DL (ref 12–16)
IMM GRANULOCYTES # BLD AUTO: 0.04 K/UL (ref 0–0.11)
IMM GRANULOCYTES NFR BLD AUTO: 0.5 % (ref 0–0.9)
LDLC SERPL CALC-MCNC: 98 MG/DL
LYMPHOCYTES # BLD AUTO: 2.36 K/UL (ref 1–4.8)
LYMPHOCYTES NFR BLD: 31.7 % (ref 22–41)
MCH RBC QN AUTO: 31.2 PG (ref 27–33)
MCHC RBC AUTO-ENTMCNC: 34.7 G/DL (ref 32.2–35.5)
MCV RBC AUTO: 89.8 FL (ref 81.4–97.8)
MONOCYTES # BLD AUTO: 0.78 K/UL (ref 0–0.85)
MONOCYTES NFR BLD AUTO: 10.5 % (ref 0–13.4)
NEUTROPHILS # BLD AUTO: 3.92 K/UL (ref 1.82–7.42)
NEUTROPHILS NFR BLD: 52.6 % (ref 44–72)
NRBC # BLD AUTO: 0 K/UL
NRBC BLD-RTO: 0 /100 WBC (ref 0–0.2)
PLATELET # BLD AUTO: 319 K/UL (ref 164–446)
PMV BLD AUTO: 9 FL (ref 9–12.9)
POTASSIUM SERPL-SCNC: 3.9 MMOL/L (ref 3.6–5.5)
PROT SERPL-MCNC: 7 G/DL (ref 6–8.2)
RBC # BLD AUTO: 4.52 M/UL (ref 4.2–5.4)
SODIUM SERPL-SCNC: 137 MMOL/L (ref 135–145)
TRIGL SERPL-MCNC: 88 MG/DL (ref 0–149)
TSH SERPL DL<=0.005 MIU/L-ACNC: 1.9 UIU/ML (ref 0.38–5.33)
WBC # BLD AUTO: 7.5 K/UL (ref 4.8–10.8)

## 2025-05-30 PROCEDURE — 82306 VITAMIN D 25 HYDROXY: CPT

## 2025-05-30 PROCEDURE — 36415 COLL VENOUS BLD VENIPUNCTURE: CPT

## 2025-05-30 PROCEDURE — 87340 HEPATITIS B SURFACE AG IA: CPT

## 2025-05-30 PROCEDURE — 80053 COMPREHEN METABOLIC PANEL: CPT

## 2025-05-30 PROCEDURE — 84443 ASSAY THYROID STIM HORMONE: CPT

## 2025-05-30 PROCEDURE — 83036 HEMOGLOBIN GLYCOSYLATED A1C: CPT

## 2025-05-30 PROCEDURE — 85025 COMPLETE CBC W/AUTO DIFF WBC: CPT

## 2025-05-30 PROCEDURE — 86706 HEP B SURFACE ANTIBODY: CPT

## 2025-05-30 PROCEDURE — 80061 LIPID PANEL: CPT
